# Patient Record
Sex: FEMALE | Race: WHITE | NOT HISPANIC OR LATINO | Employment: FULL TIME | URBAN - METROPOLITAN AREA
[De-identification: names, ages, dates, MRNs, and addresses within clinical notes are randomized per-mention and may not be internally consistent; named-entity substitution may affect disease eponyms.]

---

## 2017-01-04 ENCOUNTER — HOSPITAL ENCOUNTER (OUTPATIENT)
Dept: RADIOLOGY | Facility: HOSPITAL | Age: 57
Discharge: HOME | End: 2017-01-04
Attending: INTERNAL MEDICINE
Payer: COMMERCIAL

## 2017-01-04 DIAGNOSIS — E04.1 THYROID NODULE: ICD-10-CM

## 2017-01-04 PROCEDURE — 76536 US EXAM OF HEAD AND NECK: CPT | Mod: TC

## 2017-01-04 PROCEDURE — 76536 US EXAM OF HEAD AND NECK: CPT | Mod: 26,,, | Performed by: RADIOLOGY

## 2017-03-09 ENCOUNTER — OFFICE VISIT (OUTPATIENT)
Dept: FAMILY MEDICINE | Facility: CLINIC | Age: 57
End: 2017-03-09
Payer: COMMERCIAL

## 2017-03-09 VITALS
BODY MASS INDEX: 27.18 KG/M2 | OXYGEN SATURATION: 95 % | HEIGHT: 62 IN | HEART RATE: 70 BPM | SYSTOLIC BLOOD PRESSURE: 110 MMHG | TEMPERATURE: 99 F | WEIGHT: 147.69 LBS | DIASTOLIC BLOOD PRESSURE: 80 MMHG

## 2017-03-09 DIAGNOSIS — Z87.891 FORMER SMOKER: ICD-10-CM

## 2017-03-09 DIAGNOSIS — E55.9 VITAMIN D DEFICIENCY DISEASE: ICD-10-CM

## 2017-03-09 DIAGNOSIS — M51.36 BULGING LUMBAR DISC: ICD-10-CM

## 2017-03-09 DIAGNOSIS — E07.9 THYROID DISEASE: Primary | ICD-10-CM

## 2017-03-09 PROBLEM — M51.369 BULGING LUMBAR DISC: Status: ACTIVE | Noted: 2017-03-09

## 2017-03-09 PROCEDURE — 1160F RVW MEDS BY RX/DR IN RCRD: CPT | Mod: S$GLB,,, | Performed by: FAMILY MEDICINE

## 2017-03-09 PROCEDURE — 99204 OFFICE O/P NEW MOD 45 MIN: CPT | Mod: S$GLB,,, | Performed by: FAMILY MEDICINE

## 2017-03-09 PROCEDURE — 99999 PR PBB SHADOW E&M-EST. PATIENT-LVL III: CPT | Mod: PBBFAC,,, | Performed by: FAMILY MEDICINE

## 2017-03-09 RX ORDER — DICLOFENAC SODIUM 75 MG/1
TABLET, DELAYED RELEASE ORAL
Refills: 0 | COMMUNITY
Start: 2017-01-31 | End: 2017-03-09 | Stop reason: SDUPTHER

## 2017-03-09 RX ORDER — MONTELUKAST SODIUM 10 MG/1
TABLET ORAL
Refills: 0 | COMMUNITY
Start: 2017-02-27 | End: 2017-08-08

## 2017-03-09 RX ORDER — DICLOFENAC SODIUM 75 MG/1
75 TABLET, DELAYED RELEASE ORAL 2 TIMES DAILY PRN
Qty: 60 TABLET | Refills: 0 | Status: SHIPPED | OUTPATIENT
Start: 2017-03-09 | End: 2018-03-08

## 2017-03-09 RX ORDER — ERGOCALCIFEROL 1.25 MG/1
50000 CAPSULE ORAL
Qty: 12 CAPSULE | Refills: 3 | Status: SHIPPED | OUTPATIENT
Start: 2017-03-09 | End: 2018-04-04 | Stop reason: SDUPTHER

## 2017-03-09 NOTE — MR AVS SNAPSHOT
[facility]  [address]  [place] [address]  Phone: [phone]  Fax: [phone]                  Darlyn Donato   3/9/2017 11:00 AM   Office Visit    Description:  Female : 1960   Provider:  Michelle Caraballo MD   Department:  [facility]           Reason for Visit     Establish Care           Diagnoses this Visit        Comments    Thyroid disease    -  Primary     Former smoker         Bulging lumbar disc         Vitamin D deficiency disease                To Do List           Goals (5 Years of Data)     None       These Medications        Disp Refills Start End    diclofenac (VOLTAREN) 75 MG EC tablet 60 tablet 0 3/9/2017     Take 1 tablet (75 mg total) by mouth 2 (two) times daily as needed. - Oral    Pharmacy: [facility] [address] [place] [address] EXPY AT [facility] Ph #: [phone]       ergocalciferol (ERGOCALCIFEROL) 50,000 unit Cap 12 capsule 3 3/9/2017     Take 1 capsule (50,000 Units total) by mouth every 7 days. - Oral    Pharmacy: [facility] [address] [place] [address] EXPY AT [facility] Ph #: [phone]         [facility]     [facility] On Call Nurse Care Line -  Assistance  Registered nurses in the [facility] Center provide clinical advisement, health education, appointment booking, and other advisory services.  Call for this free service at [phone].             Medications           Message regarding Medications     Verify the changes and/or additions to your medication regime listed below are the same as discussed with your clinician today.  If any of these changes or additions are incorrect, please notify your healthcare provider.        START taking these NEW medications        Refills    diclofenac (VOLTAREN) 75 MG EC tablet 0    Sig: Take 1 tablet (75 mg total) by mouth 2 (two) times daily as needed.    Class: Normal    Route: Oral     ergocalciferol (ERGOCALCIFEROL) 50,000 unit Cap 3    Sig: Take 1 capsule (50,000 Units total) by mouth every 7 days.    Class: Normal    Route: Oral      STOP taking these medications     alprazolam (XANAX) 0.25 MG tablet TAKE 1 TABLET BY MOUTH THREE TIMES DAILY AS NEEDED FOR INSOMNIA OR ANXIETY    calcium carbonate (OS-AMERICA) 600 mg (1,500 mg) Tab Take 600 mg by mouth. 2-3 Tablet Oral Every day.   1500 mg for postmenopausal women    meperidine (DEMEROL) 50 mg tablet Take 1 tablet (50 mg total) by mouth every 4 (four) hours as needed for Pain.    omega-3 fatty acids (FISH OIL CONCENTRATE) 1,000 mg Cap            Verify that the below list of medications is an accurate representation of the medications you are currently taking.  If none reported, the list may be blank. If incorrect, please contact your healthcare provider. Carry this list with you in case of emergency.           Current Medications     albuterol (PROVENTIL HFA/VENTOLIN HFA) 200 puff inhaler Inhale 2 puffs into the lungs every 4 (four) hours as needed. 2 HFA Aerosol Inhaler Inhalation Every 4-6 hours    cyanocobalamin, vitamin B-12, (VITAMIN B-12) 1,000 mcg TbSR Take by mouth once daily.    diclofenac (VOLTAREN) 75 MG EC tablet Take 1 tablet (75 mg total) by mouth 2 (two) times daily as needed.    ergocalciferol (ERGOCALCIFEROL) 50,000 unit Cap Take 1 capsule (50,000 Units total) by mouth every 7 days.    estradiol (ESTRACE) 0.01 % (0.1 mg/gram) vaginal cream Place 0.5 g vaginally every other day.    FLUTICASONE/VILANTEROL (BREO ELLIPTA INHL) Inhale into the lungs.    LEVOXYL 100 mcg tablet TK 1 T PO  D    montelukast (SINGULAIR) 10 mg tablet TK 1 T PO QD    VITAMIN B COMPLEX (B-COMPLEX ORAL) Take by mouth. 1  By mouth Every day    ergocalciferol (ERGOCALCIFEROL) 50,000 unit Cap Take 1 capsule (50,000 Units total) by mouth every 7 days.           Clinical Reference Information           Your Vitals Were     BP Pulse Temp Height Weight SpO2    110/80 (BP  "Location: Right arm, Patient Position: Sitting, BP Method: Manual) 70 98.5 °F (36.9 °C) (Oral) 5' 2" (1.575 m) 67 kg (147 lb 11.3 oz) 95%    BMI                27.02 kg/m2          Blood Pressure          Most Recent Value    BP  110/80      Allergies as of 3/9/2017     Hydromorphone    Percocet  [Oxycodone-acetaminophen]    Vicodin  [Hydrocodone-acetaminophen]      Immunizations Administered on Date of Encounter - 3/9/2017     None      Orders Placed During Today's Visit     Future Labs/Procedures Expected by Expires    CBC auto differential  3/9/2017 3/9/2018    Comprehensive metabolic panel  3/9/2017 3/9/2018    Lipid panel  3/9/2017 3/9/2018    T3  3/9/2017 5/8/2018    T4  3/9/2017 5/8/2018    TSH  3/9/2017 3/9/2018    Vitamin D  3/9/2017 3/9/2018      Language Assistance Services     ATTENTION: Language assistance services are available, free of charge. Please call [phone].      ATENCIÓN: Si habla español, tiene a thorpe disposición servicios gratuitos de asistencia lingüística. Llame al [phone].     CHÚ Ý: N?u b?n nói Ti?ng Vi?t, có các d?ch v? h? tr? ngôn ng? mi?n phí dành cho b?n. G?i s? [phone].         [facility] complies with applicable Federal civil rights laws and does not discriminate on the basis of race, color, national origin, age, disability, or sex.        "

## 2017-03-09 NOTE — PROGRESS NOTES
Office Visit    Patient Name: Darlyn Donato    : 1960  MRN: [id number]    Subjective:  Darlyn is a 56 y.o. female who presents today for:    Establish Care (pt states she is just getting bronchial infection/check up)      This patient has multiple medical diagnoses as noted below.  This patient is new to me and to this clinic.  She recently had a bronchial infection.  She was treated by the urgent care.  She has been using Breo, but has had reactions to the medications.  She has persistent swelling in her bronchial region. She is scheduled for a pulmonary function test.  She is doing better since her illness.        Active Problem List  Patient Active Problem List   Diagnosis    Vitamin D deficiency disease    Kidney stones    Hyperlipidemia    Osteopenia    Thyroid disease    Elevated alkaline phosphatase level    Goiter    Ovarian cyst    Hyperthyroidism    Former smoker    Bulging lumbar disc       Past Surgical History  Past Surgical History:   Procedure Laterality Date    BREAST SURGERY      benign biopsy on left    LAPAROSCOPY W/ MINI-LAPAROTOMY      for fertility issues    [place] REMOVAL OF OVARY/TUBE(S)      RECTAL SURGERY      excess skin growth removal    supracervical hysterectomy      TONSILLECTOMY         Family History  Family History   Problem Relation Age of Onset    Breast cancer Mother     Hyperlipidemia Mother     Emphysema Father     Colon cancer Neg Hx     Ovarian cancer Neg Hx        Social History  Social History     Social History    Marital status:      Spouse name: N/A    Number of children: 0    Years of education: some colle     Occupational History     law firm [employer]     Social History Main Topics    Smoking status: Former Smoker     Types: Cigarettes    Smokeless tobacco: Former User     Quit date: 1997    Alcohol use Yes      Comment: Rare    Drug use: No    Sexual activity: Yes     Partners: Male     Other Topics  "Concern    Not on file     Social History Narrative    Adult Screenings    Mammogram( for females) done 4/2011    Pap ( for females)? Needs referral to  GYN    Colonoscopy age  52-Not done yet    Flu shot yearly to be done today  1/9/13    Td ?    Pneumovax recommended one time  at age  65    Zostavax recommended one time at  age  62    Eye exam recommended yearly- not done yet     Bone density April 2011-osteopenia       Current Medications  Medications reviewed and updated.     Allergies   Review of patient's allergies indicates:   Allergen Reactions    Hydromorphone Shortness Of Breath    Percocet  [oxycodone-acetaminophen] Itching and Nausea Only    Vicodin  [hydrocodone-acetaminophen] Nausea Only and Nausea And Vomiting       Review of Systems (Pertinent positives)  Review of Systems   Constitutional: Negative for activity change, appetite change, fatigue, fever and unexpected weight change.   HENT: Negative.  Negative for ear discharge, ear pain, rhinorrhea and sore throat.    Eyes: Negative.    Respiratory: Positive for cough, shortness of breath and wheezing. Negative for apnea and chest tightness.    Cardiovascular: Negative for chest pain, palpitations and leg swelling.   Gastrointestinal: Negative for abdominal distention, abdominal pain, constipation, diarrhea and vomiting.   Endocrine: Negative for cold intolerance, heat intolerance, polydipsia and polyuria.   Genitourinary: Negative for decreased urine volume, menstrual problem, urgency, vaginal bleeding, vaginal discharge and vaginal pain.   Musculoskeletal: Negative.    Skin: Negative for rash.   Neurological: Negative for dizziness and headaches.   Hematological: Does not bruise/bleed easily.   Psychiatric/Behavioral: Negative for agitation, sleep disturbance and suicidal ideas.       /80 (BP Location: Right arm, Patient Position: Sitting, BP Method: Manual)  Pulse 70  Temp 98.5 °F (36.9 °C) (Oral)   Ht 5' 2" (1.575 m)  Wt 67 kg (147 " lb 11.3 oz)  SpO2 95%  BMI 27.02 kg/m2    Physical Exam   Constitutional: She is oriented to person, place, and time. She appears well-developed and well-nourished.   HENT:   Head: Normocephalic and atraumatic.   Right Ear: External ear normal.   Left Ear: External ear normal.   Nose: Nose normal.   Mouth/Throat: Oropharynx is clear and moist.   Eyes: Conjunctivae and EOM are normal. Pupils are equal, round, and reactive to light.   Neck: Normal range of motion. No JVD present. No thyromegaly present.   Cardiovascular: Normal rate, regular rhythm and normal heart sounds.    Pulmonary/Chest: Effort normal. She has decreased breath sounds in the right lower field and the left lower field. She has no wheezes. She has no rhonchi.   Musculoskeletal: Normal range of motion.   Lymphadenopathy:     She has no cervical adenopathy.   Neurological: She is alert and oriented to person, place, and time.   Skin: Skin is warm and dry.   Psychiatric: She has a normal mood and affect. Her behavior is normal.   Vitals reviewed.      Health Maintenance  Health Maintenance Topics with due status: Not Due       Topic Last Completion Date    Colonoscopy 02/19/2013    Lipid Panel 02/14/2014    Pap Smear 01/30/2017    Mammogram 02/16/2017       Assessment/Plan:  Darlyn Donato is a 56 y.o. female who presents today for :    Darlyn was seen today for establish care.    Diagnoses and all orders for this visit:    Thyroid disease  -     CBC auto differential; Future  -     Comprehensive metabolic panel; Future  -     Lipid panel; Future  -     TSH; Future  -     T3; Future  -     T4; Future  -  Follow up with in 4 weeks to complete blood work.   Former smoker  -  Currently in work up for COPD/ASTHMA.  -  Continue with follow up with pulmonology   -  May need ICS    Bulging lumbar disc  -     diclofenac (VOLTAREN) 75 MG EC tablet; Take 1 tablet (75 mg total) by mouth 2 (two) times daily as needed.  -    I have discussed the common side  effects of this medication with the patient and answered all of the questions they had at the time of this visit regarding this medication.  -  Conservative management     Vitamin D deficiency disease  -     ergocalciferol (ERGOCALCIFEROL) 50,000 unit Cap; Take 1 capsule (50,000 Units total) by mouth every 7 days.  -     Vitamin D; Future    Other orders  -     Cancel: Tdap Vaccine  -     Cancel: Influenza - Quadrivalent (3 years & older) (PF)    Greater than 45 minutes was spent with this patient with greater than 50% spent with face-to-face counseling        No Follow-up on file.

## 2017-04-17 DIAGNOSIS — E05.90 HYPERTHYROIDISM: Primary | ICD-10-CM

## 2017-04-17 RX ORDER — LEVOTHYROXINE SODIUM 100 UG/1
TABLET ORAL
Qty: 30 TABLET | Refills: 0 | Status: SHIPPED | OUTPATIENT
Start: 2017-04-17 | End: 2017-05-15 | Stop reason: SDUPTHER

## 2017-04-17 NOTE — TELEPHONE ENCOUNTER
----- Message from Shyann Gibson sent at 4/17/2017  1:02 PM CDT -----  Patient is calling to request labs. Please call at [phone] Thank you!

## 2017-04-19 ENCOUNTER — LAB VISIT (OUTPATIENT)
Dept: LAB | Facility: HOSPITAL | Age: 57
End: 2017-04-19
Attending: FAMILY MEDICINE
Payer: COMMERCIAL

## 2017-04-19 DIAGNOSIS — E07.9 THYROID DISEASE: ICD-10-CM

## 2017-04-19 DIAGNOSIS — E55.9 VITAMIN D DEFICIENCY DISEASE: ICD-10-CM

## 2017-04-19 LAB
25(OH)D3+25(OH)D2 SERPL-MCNC: 29 NG/ML
ALBUMIN SERPL BCP-MCNC: 3.7 G/DL
ALP SERPL-CCNC: 127 U/L
ALT SERPL W/O P-5'-P-CCNC: 27 U/L
ANION GAP SERPL CALC-SCNC: 9 MMOL/L
AST SERPL-CCNC: 13 U/L
BASOPHILS # BLD AUTO: 0.04 K/UL
BASOPHILS NFR BLD: 0.5 %
BILIRUB SERPL-MCNC: 0.5 MG/DL
BUN SERPL-MCNC: 13 MG/DL
CALCIUM SERPL-MCNC: 9.3 MG/DL
CHLORIDE SERPL-SCNC: 109 MMOL/L
CHOLEST/HDLC SERPL: 5.6 {RATIO}
CO2 SERPL-SCNC: 28 MMOL/L
CREAT SERPL-MCNC: 0.8 MG/DL
DIFFERENTIAL METHOD: NORMAL
EOSINOPHIL # BLD AUTO: 0.4 K/UL
EOSINOPHIL NFR BLD: 4.6 %
ERYTHROCYTE [DISTWIDTH] IN BLOOD BY AUTOMATED COUNT: 14.2 %
EST. GFR  (AFRICAN AMERICAN): >60 ML/MIN/1.73 M^2
EST. GFR  (NON AFRICAN AMERICAN): >60 ML/MIN/1.73 M^2
GLUCOSE SERPL-MCNC: 96 MG/DL
HCT VFR BLD AUTO: 42 %
HDL/CHOLESTEROL RATIO: 17.9 %
HDLC SERPL-MCNC: 212 MG/DL
HDLC SERPL-MCNC: 38 MG/DL
HGB BLD-MCNC: 13.6 G/DL
LDLC SERPL CALC-MCNC: 145.6 MG/DL
LYMPHOCYTES # BLD AUTO: 2.3 K/UL
LYMPHOCYTES NFR BLD: 26.7 %
MCH RBC QN AUTO: 29.2 PG
MCHC RBC AUTO-ENTMCNC: 32.4 %
MCV RBC AUTO: 90 FL
MONOCYTES # BLD AUTO: 0.7 K/UL
MONOCYTES NFR BLD: 7.7 %
NEUTROPHILS # BLD AUTO: 5.3 K/UL
NEUTROPHILS NFR BLD: 60.3 %
NONHDLC SERPL-MCNC: 174 MG/DL
PLATELET # BLD AUTO: 340 K/UL
PMV BLD AUTO: 10 FL
POTASSIUM SERPL-SCNC: 4.3 MMOL/L
PROT SERPL-MCNC: 7 G/DL
RBC # BLD AUTO: 4.66 M/UL
SODIUM SERPL-SCNC: 146 MMOL/L
T3 SERPL-MCNC: 76 NG/DL
T4 SERPL-MCNC: 8.5 UG/DL
TRIGL SERPL-MCNC: 142 MG/DL
TSH SERPL DL<=0.005 MIU/L-ACNC: 2.17 UIU/ML
WBC # BLD AUTO: 8.74 K/UL

## 2017-04-19 PROCEDURE — 84480 ASSAY TRIIODOTHYRONINE (T3): CPT

## 2017-04-19 PROCEDURE — 82306 VITAMIN D 25 HYDROXY: CPT

## 2017-04-19 PROCEDURE — 80053 COMPREHEN METABOLIC PANEL: CPT

## 2017-04-19 PROCEDURE — 84443 ASSAY THYROID STIM HORMONE: CPT

## 2017-04-19 PROCEDURE — 85025 COMPLETE CBC W/AUTO DIFF WBC: CPT

## 2017-04-19 PROCEDURE — 84436 ASSAY OF TOTAL THYROXINE: CPT

## 2017-04-19 PROCEDURE — 36415 COLL VENOUS BLD VENIPUNCTURE: CPT | Mod: PO

## 2017-04-19 PROCEDURE — 80061 LIPID PANEL: CPT

## 2017-05-01 NOTE — PROGRESS NOTES
Your vitamin D is still low, but I want you to continue with oral vitamin D.  Your cholesterol is slightly elevated.  Remember to avoid high fat foods.

## 2017-05-15 DIAGNOSIS — E05.90 HYPERTHYROIDISM: ICD-10-CM

## 2017-05-15 RX ORDER — LEVOTHYROXINE SODIUM 100 UG/1
TABLET ORAL
Qty: 30 TABLET | Refills: 5 | Status: SHIPPED | OUTPATIENT
Start: 2017-05-15 | End: 2017-12-05 | Stop reason: SDUPTHER

## 2017-08-08 ENCOUNTER — OFFICE VISIT (OUTPATIENT)
Dept: DERMATOLOGY | Facility: CLINIC | Age: 57
End: 2017-08-08
Payer: COMMERCIAL

## 2017-08-08 DIAGNOSIS — D22.9 MULTIPLE BENIGN NEVI: ICD-10-CM

## 2017-08-08 DIAGNOSIS — L82.1 SEBORRHEIC KERATOSES: ICD-10-CM

## 2017-08-08 DIAGNOSIS — D48.5 NEOPLASM OF UNCERTAIN BEHAVIOR OF SKIN: Primary | ICD-10-CM

## 2017-08-08 PROCEDURE — 88305 TISSUE EXAM BY PATHOLOGIST: CPT | Performed by: PATHOLOGY

## 2017-08-08 PROCEDURE — 99202 OFFICE O/P NEW SF 15 MIN: CPT | Mod: 25,S$GLB,, | Performed by: DERMATOLOGY

## 2017-08-08 PROCEDURE — 11100 PR BIOPSY OF SKIN LESION: CPT | Mod: S$GLB,,, | Performed by: DERMATOLOGY

## 2017-08-08 PROCEDURE — 88305 TISSUE EXAM BY PATHOLOGIST: CPT | Mod: 26,,, | Performed by: PATHOLOGY

## 2017-08-08 PROCEDURE — 99999 PR PBB SHADOW E&M-EST. PATIENT-LVL III: CPT | Mod: PBBFAC,,, | Performed by: DERMATOLOGY

## 2017-08-08 PROCEDURE — 3008F BODY MASS INDEX DOCD: CPT | Mod: S$GLB,,, | Performed by: DERMATOLOGY

## 2017-08-08 PROCEDURE — 11101 PR BIOPSY, EACH ADDED LESION: CPT | Mod: S$GLB,,, | Performed by: DERMATOLOGY

## 2017-08-08 PROCEDURE — 88342 IMHCHEM/IMCYTCHM 1ST ANTB: CPT | Mod: 26,,, | Performed by: PATHOLOGY

## 2017-08-08 NOTE — PROGRESS NOTES
"  Subjective:       Patient ID:  Darlyn Donato is a 57 y.o. female who presents for   Chief Complaint   Patient presents with    Skin Check     UBSE     HPI  56 yo F presents for skin check.  There is a spot on scalp that was noticed about 1 year ago by her niece.  Unsure how long it has been present.  No bleeding.  No prior treatments.  There is a spot on her L hip present since she was 12 years old.  Traumatized and painful, no prior treatments    Denies personal or family h/o skin cancer (mother with h/o breast cancer)    Past Medical History:   Diagnosis Date    Depression     Elevated alkaline phosphatase level     Hyperlipidemia     Osteopenia     Personal history of female infertility     Respiratory distress     " stopped Breathing" after 2 pain meds were given in a close time frame to one another.    Thyroid disease     multinodular goiter    Vitamin D deficiency disease      Review of Systems   Constitutional: Positive for fatigue and night sweats. Negative for fever, chills, weight loss, weight gain and malaise.   Skin: Negative for daily sunscreen use and recent sunburn.   Hematologic/Lymphatic: Does not bruise/bleed easily.        Objective:    Physical Exam   Constitutional: She appears well-developed and well-nourished.   Neurological: She is alert and oriented to person, place, and time.   Psychiatric: She has a normal mood and affect.   Skin:   Areas Examined (abnormalities noted in diagram):   Scalp / Hair Palpated and Inspected  Head / Face Inspection Performed  Neck Inspection Performed  Chest / Axilla Inspection Performed  Abdomen Inspection Performed  Back Inspection Performed  RUE Inspected  LUE Inspection Performed                           Diagram Legend     Erythematous scaling macule/papule c/w actinic keratosis       Vascular papule c/w angioma      Pigmented verrucoid papule/plaque c/w seborrheic keratosis      Yellow umbilicated papule c/w sebaceous hyperplasia      " Irregularly shaped tan macule c/w lentigo     1-2 mm smooth white papules consistent with Milia      Movable subcutaneous cyst with punctum c/w epidermal inclusion cyst      Subcutaneous movable cyst c/w pilar cyst      Firm pink to brown papule c/w dermatofibroma      Pedunculated fleshy papule(s) c/w skin tag(s)      Evenly pigmented macule c/w junctional nevus     Mildly variegated pigmented, slightly irregular-bordered macule c/w mildly atypical nevus      Flesh colored to evenly pigmented papule c/w intradermal nevus       Pink pearly papule/plaque c/w basal cell carcinoma      Erythematous hyperkeratotic cursted plaque c/w SCC      Surgical scar with no sign of skin cancer recurrence      Open and closed comedones      Inflammatory papules and pustules      Verrucoid papule consistent consistent with wart     Erythematous eczematous patches and plaques     Dystrophic onycholytic nail with subungual debris c/w onychomycosis     Umbilicated papule    Erythematous-base heme-crusted tan verrucoid plaque consistent with inflamed seborrheic keratosis     Erythematous Silvery Scaling Plaque c/w Psoriasis     See annotation      Assessment / Plan:      Pathology Orders:      Normal Orders This Visit    Tissue Specimen To Pathology, Dermatology     Questions:    Directional Terms:  Other(comment)    Clinical information:  SK vs nevus vs other    Specific Site:  L hip    Tissue Specimen To Pathology, Dermatology     Questions:    Directional Terms:  Other(comment)    Clinical information:  r/o blue nevus vs atypical nevus; shave then 4 mm punch of underlying pigment    Specific Site:  L crown of scalp        Neoplasm of uncertain behavior of skin  -     Tissue Specimen To Pathology, Dermatology  -     Tissue Specimen To Pathology, Dermatology  Shave biopsy procedure note:    Shave biopsy performed after verbal consent including risk of infection, scar, recurrence, need for additional treatment of site. Area prepped with  alcohol, anesthetized with approximately 1.0cc of 1% lidocaine with epinephrine. Lesional tissue shaved with razor blade. Hemostasis achieved with application of aluminum chloride. No complications. Dressing applied. Wound care explained.    Punch biopsy procedure note:  Punch biopsy performed of the pigment remaining after shave biopsy.  Verbal consent obtained. Area marked and prepped with alcohol. Approximately 1cc of 1% lidocaine with epinephrine injected. 4 mm disposable punch used to remove lesion. Hemostasis obtained and biopsy site closed with 2 Prolene sutures. Wound care instructions reviewed with patient and handout given.    Multiple benign nevi  Reassurance that her nevi appear benign with regular and consistent pigment pattern on dermoscopy    Seborrheic keratoses  These are benign inherited growths without a malignant potential. Reassurance given to patient. No treatment is necessary.            Return for pending Pathology.

## 2017-08-22 ENCOUNTER — CLINICAL SUPPORT (OUTPATIENT)
Dept: DERMATOLOGY | Facility: CLINIC | Age: 57
End: 2017-08-22
Payer: COMMERCIAL

## 2017-08-22 DIAGNOSIS — Z48.02 VISIT FOR SUTURE REMOVAL: Primary | ICD-10-CM

## 2017-08-22 PROCEDURE — 99024 POSTOP FOLLOW-UP VISIT: CPT | Mod: S$GLB,,, | Performed by: DERMATOLOGY

## 2017-08-22 PROCEDURE — 99999 PR PBB SHADOW E&M-EST. PATIENT-LVL II: CPT | Mod: PBBFAC,,,

## 2017-08-24 NOTE — PROGRESS NOTES
Suture Removal note:  CC: 57 y.o. female patient is here for suture removal.         HPI: Patient is s/p punch of rule out blue nevus vs. atypical nevus from the left crown of scalp on 8/8/2017.  Patient reports no problems.    WOUND PE:  Sutures intact.  Wound healing well.  Good approximation of skin edges.  No signs or symptoms of infection.    IMPRESSION:  Skin, left crown of scalp, shave biopsy with subsequent 4 mm punch biopsy:  - BLUE NEVUS.  MICROSCOPIC DESCRIPTION: Sections show dermal proliferation of pigmented dendritic melanocytes interspersed between and arround dermal collagen. Melanophages are additionally noted - margins clear.    PLAN:  Sutures removed today.  Continue wound care.    RTC: In 3 months or sooner if concerns arise.

## 2017-12-05 DIAGNOSIS — E05.90 HYPERTHYROIDISM: ICD-10-CM

## 2017-12-06 RX ORDER — LEVOTHYROXINE SODIUM 100 UG/1
TABLET ORAL
Qty: 30 TABLET | Refills: 0 | Status: SHIPPED | OUTPATIENT
Start: 2017-12-06 | End: 2018-01-10 | Stop reason: SDUPTHER

## 2018-01-10 DIAGNOSIS — E05.90 HYPERTHYROIDISM: ICD-10-CM

## 2018-01-11 RX ORDER — LEVOTHYROXINE SODIUM 100 UG/1
TABLET ORAL
Qty: 30 TABLET | Refills: 0 | Status: SHIPPED | OUTPATIENT
Start: 2018-01-11 | End: 2018-02-22 | Stop reason: SDUPTHER

## 2018-02-08 ENCOUNTER — OFFICE VISIT (OUTPATIENT)
Dept: FAMILY MEDICINE | Facility: CLINIC | Age: 58
End: 2018-02-08
Payer: COMMERCIAL

## 2018-02-08 VITALS
WEIGHT: 149.69 LBS | TEMPERATURE: 99 F | HEART RATE: 76 BPM | SYSTOLIC BLOOD PRESSURE: 120 MMHG | OXYGEN SATURATION: 95 % | BODY MASS INDEX: 27.55 KG/M2 | HEIGHT: 62 IN | DIASTOLIC BLOOD PRESSURE: 92 MMHG

## 2018-02-08 DIAGNOSIS — M19.072 ARTHRITIS OF BOTH FEET: ICD-10-CM

## 2018-02-08 DIAGNOSIS — M19.071 ARTHRITIS OF BOTH FEET: ICD-10-CM

## 2018-02-08 DIAGNOSIS — M19.049 ARTHRITIS OF HAND: Primary | ICD-10-CM

## 2018-02-08 PROCEDURE — 3008F BODY MASS INDEX DOCD: CPT | Mod: S$GLB,,, | Performed by: FAMILY MEDICINE

## 2018-02-08 PROCEDURE — 99213 OFFICE O/P EST LOW 20 MIN: CPT | Mod: S$GLB,,, | Performed by: FAMILY MEDICINE

## 2018-02-08 PROCEDURE — 99999 PR PBB SHADOW E&M-EST. PATIENT-LVL III: CPT | Mod: PBBFAC,,, | Performed by: FAMILY MEDICINE

## 2018-02-08 PROCEDURE — 99213 OFFICE O/P EST LOW 20 MIN: CPT | Mod: PO | Performed by: FAMILY MEDICINE

## 2018-02-08 NOTE — PROGRESS NOTES
Office Visit    Patient Name: Darlyn Donato    : 1960  MRN: [id number]      Assessment/Plan:  Darlyn Donato is a 57 y.o. female who presents today for :    Arthritis of hand  -     Ambulatory referral to Rheumatology    Arthritis of both feet  -     Ambulatory referral to Rheumatology    -may continue Aleve as needed, may also add Tylenol as needed as well.  -given duration and constellation of Sx, will obtain further eval with Rheum.        Follow-up for any evaluation as needed.     This note was created by combination of typed  and Dragon dictation.  Transcription errors may be present.  If there are any questions, please contact me.        ----------------------------------------------------------------------------------------------------------------------      HPI:  Darlyn is a 57 y.o. female who presents today for:    Hand Pain and Foot Pain        This patient has multiple medical diagnoses as noted below.    This patient is new to me   Patient is here today for bilateral Hand Pain and Foot Pain  -states that she has had joint pain for many years in her feet. Has had joint injection in both feet last year by her Ortho, who sees her for her chronic back pain. Feet pain worsened over the past 3 months, worse with walking and standing on it - worse in the morning, gets better as the day goes on. She takes Aleve as needed, but the pain is still present.    She also has worsening bilateral hand pain the past 3 months - feels that they're in the joints at the base of her fingers. Sometimes they get swollen and she can't close her fist completely. No prior Dx of Carpal Tunnel, but she does wear wrist braces for temporary relief. 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 Present daily, constant, about 6-7/10.    She doesn't want to keep taking medications for the pain and would like a specialist  referral.      Additional ROS    No F/C/wt changes/fatigue  No dysphagia/sore throat/rhinorrhea  No CP/SOB/palpitations/swelling  No cough/wheezing/SOB  No nausea/vomiting/abd pain  No muscle aches  No rashes  No weakness/HA/tingling/numbness  No anxiety/depression        Patient Care Team:  Michelle Caraballo MD as PCP - General (Family Medicine)        Patient Active Problem List   Diagnosis    Vitamin D deficiency disease    Kidney stones    Hyperlipidemia    Osteopenia    Thyroid disease    Elevated alkaline phosphatase level    Goiter    Ovarian cyst    Hyperthyroidism    Former smoker    Bulging lumbar disc       Current Medications  Medications reviewed and updated.       Current Outpatient Prescriptions:     albuterol (PROVENTIL HFA/VENTOLIN HFA) 200 puff inhaler, Inhale 2 puffs into the lungs every 4 (four) hours as needed. 2 HFA Aerosol Inhaler Inhalation Every 4-6 hours, Disp: , Rfl:     ASPIRIN/CAFFEINE/DIHYDROCODEIN (DIHYDROCODEINE-ASPIRIN-CAFF ORAL), Take by mouth., Disp: , Rfl:     cyanocobalamin, vitamin B-12, (VITAMIN B-12) 1,000 mcg TbSR, Take by mouth once daily., Disp: , Rfl:     diclofenac (VOLTAREN) 75 MG EC tablet, Take 1 tablet (75 mg total) by mouth 2 (two) times daily as needed., Disp: 60 tablet, Rfl: 0    ergocalciferol (ERGOCALCIFEROL) 50,000 unit Cap, Take 1 capsule (50,000 Units total) by mouth every 7 days., Disp: 4 capsule, Rfl: 4    ergocalciferol (ERGOCALCIFEROL) 50,000 unit Cap, Take 1 capsule (50,000 Units total) by mouth every 7 days., Disp: 12 capsule, Rfl: 3    FLUTICASONE/VILANTEROL (BREO ELLIPTA INHL), Inhale into the lungs., Disp: , Rfl:     LEVOXYL 100 mcg tablet, TAKE 1 TABLET BY MOUTH EVERY DAY, Disp: 30 tablet, Rfl: 0    VITAMIN B COMPLEX (B-COMPLEX ORAL), Take by mouth. 1  By mouth Every day, Disp: , Rfl:     estradiol (ESTRACE) 0.01 % (0.1 mg/gram) vaginal cream, Place 0.5 g vaginally every other day., Disp: 42.5 g, Rfl: 5    Past Surgical  "History:   Procedure Laterality Date    BREAST SURGERY      benign biopsy on left    LAPAROSCOPY W/ MINI-LAPAROTOMY      for fertility issues    OK REMOVAL OF OVARY/TUBE(S)      RECTAL SURGERY      excess skin growth removal    supracervical hysterectomy      TONSILLECTOMY         Family History   Problem Relation Age of Onset    Breast cancer Mother     Hyperlipidemia Mother     Emphysema Father     Colon cancer Neg Hx     Ovarian cancer Neg Hx     Melanoma Neg Hx        Social History     Social History    Marital status:      Spouse name: N/A    Number of children: 0    Years of education: some colle     Occupational History     law firm [email or web address]     Social History Main Topics    Smoking status: Former Smoker     Types: Cigarettes    Smokeless tobacco: Former User     Quit date: 8/22/1997    Alcohol use Yes      Comment: Rare    Drug use: No    Sexual activity: Yes     Partners: Male     Other Topics Concern    Not on file     Social History Narrative    Adult Screenings    Mammogram( for females) done 4/2011    Pap ( for females)? Needs referral to  GYN    Colonoscopy age  52-Not done yet    Flu shot yearly to be done today  1/9/13    Td ?    Pneumovax recommended one time  at age  67    Zostavax recommended one time at  age  62    Eye exam recommended yearly- not done yet     Bone density April 2011-osteopenia           Allergies   Review of patient's allergies indicates:   Allergen Reactions    Hydromorphone Shortness Of Breath    Percocet  [oxycodone-acetaminophen] Itching and Nausea Only    Vicodin  [hydrocodone-acetaminophen] Nausea Only and Nausea And Vomiting             Review of Systems  See HPI      Physical Exam  BP (!) 120/92   Pulse 76   Temp 98.6 °F (37 °C) (Oral)   Ht 5' 2" (1.575 m)   Wt 67.9 kg (149 lb 11.1 oz)   SpO2 95%   BMI 27.38 kg/m²     GEN: NAD, well developed, pleasant, well nourished  HEENT: NCAT, PERRLA, EOMI, sclera clear, " anicteric, O/P clear, MMM with no lesions  NECK: normal, supple with midline trachea, no LAD, no thyromegaly  LUNGS: CTAB, no w/r/r, no increased work of breathing   HEART: RRR, normal S1 and S2, no m/r/g, no edema  ABD: s/nt/nd, NABS  SKIN: normal turgor, no rashes  PSYCH: AOx3, appropriate mood and affect  MSK: warm/well perfused, normal ROM in all 4 extremities, no c/c/e.  Normal exam of R hand. FROM in all digits bilaterally. Has mild swelling with no redness in the MCP joint of L hand - with moderate TTP. Negative Tinel's/Phalen's. Bilateral foot exam wnl with FROM, no leg/ankle swelling/redness. Mild TTP over dorsal aspect of anterior feet bilaterally.

## 2018-02-16 ENCOUNTER — OFFICE VISIT (OUTPATIENT)
Dept: URGENT CARE | Facility: CLINIC | Age: 58
End: 2018-02-16
Payer: COMMERCIAL

## 2018-02-16 VITALS
OXYGEN SATURATION: 96 % | WEIGHT: 148 LBS | HEIGHT: 62 IN | BODY MASS INDEX: 27.23 KG/M2 | RESPIRATION RATE: 18 BRPM | SYSTOLIC BLOOD PRESSURE: 141 MMHG | DIASTOLIC BLOOD PRESSURE: 86 MMHG | TEMPERATURE: 99 F | HEART RATE: 101 BPM

## 2018-02-16 DIAGNOSIS — J01.40 ACUTE NON-RECURRENT PANSINUSITIS: Primary | ICD-10-CM

## 2018-02-16 DIAGNOSIS — R05.9 COUGH: ICD-10-CM

## 2018-02-16 PROCEDURE — 99213 OFFICE O/P EST LOW 20 MIN: CPT | Mod: 25,S$GLB,, | Performed by: NURSE PRACTITIONER

## 2018-02-16 PROCEDURE — 96372 THER/PROPH/DIAG INJ SC/IM: CPT | Mod: S$GLB,,, | Performed by: FAMILY MEDICINE

## 2018-02-16 PROCEDURE — 3008F BODY MASS INDEX DOCD: CPT | Mod: S$GLB,,, | Performed by: NURSE PRACTITIONER

## 2018-02-16 RX ORDER — BETAMETHASONE SODIUM PHOSPHATE AND BETAMETHASONE ACETATE 3; 3 MG/ML; MG/ML
6 INJECTION, SUSPENSION INTRA-ARTICULAR; INTRALESIONAL; INTRAMUSCULAR; SOFT TISSUE
Status: COMPLETED | OUTPATIENT
Start: 2018-02-16 | End: 2018-02-16

## 2018-02-16 RX ORDER — BENZONATATE 100 MG/1
200 CAPSULE ORAL 3 TIMES DAILY PRN
Qty: 30 CAPSULE | Refills: 0 | Status: SHIPPED | OUTPATIENT
Start: 2018-02-16 | End: 2018-02-26

## 2018-02-16 RX ORDER — AMOXICILLIN AND CLAVULANATE POTASSIUM 875; 125 MG/1; MG/1
1 TABLET, FILM COATED ORAL 2 TIMES DAILY
Qty: 20 TABLET | Refills: 0 | Status: SHIPPED | OUTPATIENT
Start: 2018-02-16 | End: 2018-02-26

## 2018-02-16 RX ADMIN — BETAMETHASONE SODIUM PHOSPHATE AND BETAMETHASONE ACETATE 6 MG: 3; 3 INJECTION, SUSPENSION INTRA-ARTICULAR; INTRALESIONAL; INTRAMUSCULAR; SOFT TISSUE at 06:02

## 2018-02-16 NOTE — PROGRESS NOTES
"Subjective:       Patient ID: Darlyn Donato is a 57 y.o. female.    Vitals:  height is 5' 2" (1.575 m) and weight is 67.1 kg (148 lb). Her oral temperature is 98.6 °F (37 °C). Her blood pressure is 141/86 (abnormal) and her pulse is 101. Her respiration is 18 and oxygen saturation is 96%.     Chief Complaint: Cough and Knee Pain    Pt reports she has been congested and coughing for 1 week with sinus pressure. Cough is productive       Cough   The current episode started in the past 7 days. The problem has been gradually worsening. The problem occurs constantly. Associated symptoms include headaches, shortness of breath and wheezing. Pertinent negatives include no chest pain, chills, ear pain, eye redness, fever, myalgias or sore throat. The symptoms are aggravated by lying down. She has tried OTC cough suppressant for the symptoms. Her past medical history is significant for bronchitis.     Review of Systems   Constitution: Negative for chills, fever and malaise/fatigue.   HENT: Negative for congestion, ear pain, hoarse voice and sore throat.    Eyes: Negative for discharge and redness.   Cardiovascular: Negative for chest pain, dyspnea on exertion and leg swelling.   Respiratory: Positive for cough, shortness of breath and wheezing. Negative for sputum production.    Musculoskeletal: Negative for myalgias.   Gastrointestinal: Negative for abdominal pain and nausea.   Neurological: Positive for headaches.       Objective:      Physical Exam   Constitutional: She is oriented to person, place, and time. Vital signs are normal. She appears well-developed and well-nourished. She is cooperative.  Non-toxic appearance. She does not have a sickly appearance. She does not appear ill. No distress.   HENT:   Head: Normocephalic and atraumatic.   Right Ear: Hearing, tympanic membrane, external ear and ear canal normal.   Left Ear: Hearing, tympanic membrane, external ear and ear canal normal.   Nose: Mucosal edema and " rhinorrhea present. Right sinus exhibits maxillary sinus tenderness and frontal sinus tenderness. Left sinus exhibits maxillary sinus tenderness and frontal sinus tenderness.   Mouth/Throat: Uvula is midline and mucous membranes are normal. Posterior oropharyngeal erythema present.   Eyes: Conjunctivae and lids are normal.   Neck: Normal range of motion and full passive range of motion without pain. Neck supple. No neck rigidity. No edema, no erythema and normal range of motion present.   Cardiovascular: Normal rate, regular rhythm and normal heart sounds.    Pulmonary/Chest: Effort normal and breath sounds normal. No accessory muscle usage. No apnea, no tachypnea and no bradypnea. No respiratory distress. She has no decreased breath sounds. She has no wheezes. She has no rhonchi. She has no rales.   Positive cough   Abdominal: Normal appearance.   Lymphadenopathy:        Head (right side): No submental, no submandibular and no tonsillar adenopathy present.        Head (left side): No submental, no submandibular and no tonsillar adenopathy present.     She has no cervical adenopathy.   Neurological: She is alert and oriented to person, place, and time.   Psychiatric: She has a normal mood and affect. Her speech is normal and behavior is normal.   Nursing note and vitals reviewed.      Assessment:       1. Acute non-recurrent pansinusitis    2. Cough        Plan:         Acute non-recurrent pansinusitis  -     betamethasone acetate-betamethasone sodium phosphate injection 6 mg; Inject 1 mL (6 mg total) into the muscle one time.  -     benzonatate (TESSALON PERLES) 100 MG capsule; Take 2 capsules (200 mg total) by mouth 3 (three) times daily as needed.  Dispense: 30 capsule; Refill: 0  -     amoxicillin-clavulanate 875-125mg (AUGMENTIN) 875-125 mg per tablet; Take 1 tablet by mouth 2 (two) times daily.  Dispense: 20 tablet; Refill: 0    Cough      Instructed pt to follow up with pcp for no improvement in 7-10 days.

## 2018-02-17 NOTE — PATIENT INSTRUCTIONS
Please drink plenty of fluids.  Please get plenty of rest.    Please return here or go to the Emergency Department for any concerns or worsening of condition.    If you were prescribed antibiotics, please take them to completion.    If you do have Hypertension or palpitations, it is safe to take Coricidin HBP or Mucinex DM for relief of congestion and cough. Take as directed on bottle with at least 2 glasses of water.    If not allergic, please take over the counter Tylenol (Acetaminophen) and/or Motrin (Ibuprofen) as directed on bottle for control of pain and/or fever.    Please follow up with your primary care doctor or specialist as needed.    If you  smoke, please stop smoking.    Acute Bacterial Rhinosinusitis (ABRS)    Acute bacterial rhinosinusitis (ABRS) is an infection of your nasal cavity and sinuses. Its caused by bacteria. Acute means that youve had symptoms for less than 12 weeks.  Understanding your sinuses  The nasal cavity is the large air-filled space behind your nose. The sinuses are a group of spaces formed by the bones of your face. They connect with your nasal cavity. ABRS causes the tissue lining these spaces to become inflamed. Mucus may not drain normally. This leads to facial pain and other symptoms.  What causes ABRS?  ABRS most often follows an upper respiratory infection caused by a virus. Bacteria then infect the lining of your nasal cavity and sinuses. But you can also get ABRS if you have:  · Nasal allergies  · Long-term nasal swelling and congestion not caused by allergies  · Blockage in the nose  Symptoms of ABRS  The symptoms of ABRS may be different for each person, and can include:  · Nasal congestion  · Runny nose  · Fluid draining from the nose down the throat (postnasal drip)  · Headache  · Cough  · Pain in the sinuses  · Thick, colored fluid from the nose (mucus)  · Fever  Diagnosing ABRS  ABRS may be diagnosed if youve had an upper respiratory infection like a cold and  cough for longer than 10 to 14 days. Your health care provider will ask about your symptoms and your medical history. The provider will check your vital signs, including your temperature. Youll have a physical exam. The health care provider will check your ears, nose, and throat. You likely wont need any tests. If ABRS comes back, you may have a culture or other tests.  Treatment for ABRS  Treatment may include:  · Antibiotic medicine. This is for symptoms that last for at least 10 to 14 days.  · Nasal corticosteroid medicine. Drops or spray used in the nose can lessen swelling and congestion.  · Over-the-counter pain medicine. This is to lessen sinus pain and pressure.  · Nasal decongestant medicine. Spray or drops may help to lessen congestion. Do not use them for more than a few days.  · Salt wash (saline irrigation). This can help to loosen mucus.  Possible complications of ABRS  ABRS may come back or become long-term (chronic).  In rare cases, ABRS may cause complications such as:   · Inflamed tissue around the brain and spinal cord (meningitis)  · Inflamed tissue around the eyes (orbital cellulitis)  · Inflamed bones around the sinuses (osteitis)  These problems may need to be treated in a hospital with intravenous (IV) antibiotic medicine or surgery.  When to call the health care provider  Call your health care provider if you have any of the following:  · Symptoms that dont get better, or get worse  · Symptoms that dont get better after 3 to 5 days on antibiotics  · Trouble seeing  · Swelling around your eyes  · Confusion or trouble staying awake   Date Last Reviewed: 3/3/2015  © 5435-5692 [employer]. [address] [place] [address], [place] [address]. All rights reserved. This information is not intended as a substitute for professional medical care. Always follow your healthcare professional's instructions.

## 2018-02-22 DIAGNOSIS — E05.90 HYPERTHYROIDISM: ICD-10-CM

## 2018-02-22 RX ORDER — LEVOTHYROXINE SODIUM 100 UG/1
TABLET ORAL
Qty: 30 TABLET | Refills: 0 | Status: SHIPPED | OUTPATIENT
Start: 2018-02-22 | End: 2018-04-05 | Stop reason: SDUPTHER

## 2018-03-08 ENCOUNTER — HOSPITAL ENCOUNTER (OUTPATIENT)
Dept: RADIOLOGY | Facility: HOSPITAL | Age: 58
Discharge: HOME | End: 2018-03-08
Attending: INTERNAL MEDICINE
Payer: COMMERCIAL

## 2018-03-08 ENCOUNTER — OFFICE VISIT (OUTPATIENT)
Dept: RHEUMATOLOGY | Facility: CLINIC | Age: 58
End: 2018-03-08
Payer: COMMERCIAL

## 2018-03-08 VITALS
SYSTOLIC BLOOD PRESSURE: 139 MMHG | WEIGHT: 147 LBS | BODY MASS INDEX: 26.89 KG/M2 | DIASTOLIC BLOOD PRESSURE: 95 MMHG | HEART RATE: 72 BPM

## 2018-03-08 DIAGNOSIS — M13.0 POLYARTHRITIS: Primary | ICD-10-CM

## 2018-03-08 DIAGNOSIS — M13.0 POLYARTHRITIS: ICD-10-CM

## 2018-03-08 DIAGNOSIS — M51.36 BULGING LUMBAR DISC: ICD-10-CM

## 2018-03-08 PROCEDURE — 99999 PR PBB SHADOW E&M-EST. PATIENT-LVL III: CPT | Mod: PBBFAC,,, | Performed by: INTERNAL MEDICINE

## 2018-03-08 PROCEDURE — 73630 X-RAY EXAM OF FOOT: CPT | Mod: 50,TC

## 2018-03-08 PROCEDURE — 73630 X-RAY EXAM OF FOOT: CPT | Mod: 26,50,, | Performed by: RADIOLOGY

## 2018-03-08 PROCEDURE — 73130 X-RAY EXAM OF HAND: CPT | Mod: 50,TC

## 2018-03-08 PROCEDURE — 73130 X-RAY EXAM OF HAND: CPT | Mod: 26,50,, | Performed by: RADIOLOGY

## 2018-03-08 PROCEDURE — 99244 OFF/OP CNSLTJ NEW/EST MOD 40: CPT | Mod: S$GLB,,, | Performed by: INTERNAL MEDICINE

## 2018-03-08 RX ORDER — FENOPROFEN CALCIUM 400 MG/1
400 CAPSULE ORAL ONCE AS NEEDED
COMMUNITY
End: 2018-03-08

## 2018-03-08 RX ORDER — DICLOFENAC SODIUM 75 MG/1
75 TABLET, DELAYED RELEASE ORAL 2 TIMES DAILY PRN
Qty: 60 TABLET | Refills: 3 | Status: SHIPPED | OUTPATIENT
Start: 2018-03-08 | End: 2018-04-07

## 2018-03-08 NOTE — LETTER
March 8, 2018      Chu Kimball MD  [address]  [place] [address]           [facility]  [address]  [place] [address]  Phone: [phone]  Fax: [phone]          Patient: Darlyn Donato   MR Number: [id number]   YOB: 1960   Date of Visit: 3/8/2018       Dear Dr. Chu Kimball:    Thank you for referring Darlyn Donato to me for evaluation. Attached you will find relevant portions of my assessment and plan of care.    If you have questions, please do not hesitate to call me. I look forward to following Darlyn Donato along with you.    Sincerely,    Zain Hernandez MD    Enclosure  CC:  No Recipients    If you would like to receive this communication electronically, please contact [email or web address] or [phone] to request more information on [facility] access.    For providers and/or their staff who would like to refer a patient to [facility], please contact us through our one-stop-shop provider referral line, [facility], at [phone].    If you feel you have received this communication in error or would no longer like to receive these types of communications, please e-mail [email or web address]

## 2018-03-08 NOTE — PROGRESS NOTES
History of present illness: 57-year-old female with chronic low back pain.  Proximally 2 years ago she developed pain initially in the right foot.  The pain was primarily on the dorsum of the foot.  She had some swelling in the area.  It was worse when she got out of bed in the morning.  It was worse with activity.  It does wake her up at night.  She has 2 hours of morning stiffness.  She had no erythema but some increased warmth accompanying the swelling.  She has had some paresthesias in her feet.  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  This helped for a period of time.  The pain is now spread to the ankles.  She then developed pain in the wrist.  It spread into the fingers.  It is been especially bad in the left second finger.  She has trouble bending that finger.  She has had some pain in the neck.  She has had no problems with the elbows or shoulders.  She has had some pain in the knees.    She had been placed on diclofenac several years ago for her back problem.  It helped at that time.   She takes Aleve intermittently with some response.  She has not been using heat, ice, or topical medications.  She was given a cortisone shot when she had a sinus infection.  This helped all her pain for a period of time.    No fever, headache, rash, conjunctivitis, oral ulcers, dry eyes or mouth, Raynaud's phenomenon, pleurisy, chronic or bloody diarrhea, vaginal or urethral discharge or ulcer, numbness or tingling, blood clots or phlebitis.  Her mother has rheumatoid arthritis.    Systems review:  Gen.: Weight has been stable  GI: No abdominal pain or peptic ulcer disease.  No liver problems.  : No kidney or bladder problems    Physical examination:  Skin: No rashes  ENT: Adequate tears and saliva  Musculoskeletal: Cervical spine has good range of motion without pain on range of motion.  She has tenderness over the acromioclavicular joints bilaterally but has full range of motion of the shoulder  without pain on range of motion.  Elbows and wrist are unremarkable.  She has tenderness of the left second MCP.  She has no definite swelling.  She has decreased range of motion of the finger.  Lumbar spine has good range of motion without pain on range of motion.  Hips and knees are within normal limits.  She has tenderness on the lateral aspect of the right ankle and foot.  She has no swelling.  She has pain on range of motion of the left knee but no tender area.  She is tender across the left MTPs.    Assessment: Most likely we're dealing with osteoarthritis.  I do wish to rule out an inflammatory arthritis.    Plans:  1.  Laboratory studies and x-rays are obtained  2.  I changed her anti-inflammatory medicine back to diclofenac 75 mg twice daily  3.  Return to see me in 2 months

## 2018-03-22 ENCOUNTER — TELEPHONE (OUTPATIENT)
Dept: RHEUMATOLOGY | Facility: CLINIC | Age: 58
End: 2018-03-22

## 2018-03-22 NOTE — TELEPHONE ENCOUNTER
----- Message from Dottie Landa MA sent at 3/21/2018  2:12 PM CDT -----  Contact: self      ----- Message -----  From: Hillary Hoyos  Sent: 3/21/2018  10:43 AM  To: Mary MATIAS Staff    Pt called stating she is having an issue with diclofenac (VOLTAREN) 75 MG EC tablet and she would like to be prescribe a different medication.  [phone]

## 2018-03-22 NOTE — TELEPHONE ENCOUNTER
She has had some response to diclofenac but she is still taking Tylenol.  She is only been on it for 3 weeks.  I told her to give it another week and if it is still not helping I will change to a different anti-inflammatory medicine at that time.

## 2018-04-04 DIAGNOSIS — E55.9 VITAMIN D DEFICIENCY DISEASE: ICD-10-CM

## 2018-04-04 RX ORDER — ERGOCALCIFEROL 1.25 MG/1
CAPSULE ORAL
Qty: 12 CAPSULE | Refills: 0 | Status: SHIPPED | OUTPATIENT
Start: 2018-04-04 | End: 2018-05-09 | Stop reason: SDUPTHER

## 2018-04-05 DIAGNOSIS — M51.36 BULGING LUMBAR DISC: ICD-10-CM

## 2018-04-05 DIAGNOSIS — E05.90 HYPERTHYROIDISM: ICD-10-CM

## 2018-04-05 RX ORDER — DICLOFENAC SODIUM 75 MG/1
75 TABLET, DELAYED RELEASE ORAL 2 TIMES DAILY PRN
Qty: 60 TABLET | Refills: 3 | OUTPATIENT
Start: 2018-04-05 | End: 2018-05-05

## 2018-04-05 RX ORDER — SULINDAC 200 MG/1
200 TABLET ORAL 2 TIMES DAILY
Qty: 60 TABLET | Refills: 4 | Status: SHIPPED | OUTPATIENT
Start: 2018-04-05 | End: 2018-04-26 | Stop reason: SINTOL

## 2018-04-05 NOTE — TELEPHONE ENCOUNTER
----- Message from Dottie Landa MA sent at 4/5/2018  4:24 PM CDT -----  Pt states the pain, swelling in feet. Ankles are stiff. Knee pain. Taking 3 times a day for a week. She stopped the tylenol completely then started 3 times a day with diclofenac   Please advise.   ----- Message -----  From: Zain Hernandez MD  Sent: 4/5/2018   4:17 PM  To: Dottie Landa MA    I only prescribed diclofenac twice daily.  If she is taking it 3 times daily, I need to change her to something else.  It could cause liver problems at that dosage.

## 2018-04-05 NOTE — TELEPHONE ENCOUNTER
Has been taking diclofenac 3 times daily.  I told her this is too much.  I will change her to sulindac 200 mg twice daily.  She may take Tylenol along with the sulindac.

## 2018-04-06 RX ORDER — LEVOTHYROXINE SODIUM 100 UG/1
100 TABLET ORAL DAILY
Qty: 15 TABLET | Refills: 0 | Status: SHIPPED | OUTPATIENT
Start: 2018-04-06 | End: 2018-04-28 | Stop reason: SDUPTHER

## 2018-04-23 ENCOUNTER — TELEPHONE (OUTPATIENT)
Dept: RHEUMATOLOGY | Facility: CLINIC | Age: 58
End: 2018-04-23

## 2018-04-23 NOTE — TELEPHONE ENCOUNTER
----- Message from Dottie Landa MA sent at 4/23/2018  1:18 PM CDT -----  Contact: Pt      ----- Message -----  From: Ameya Velez  Sent: 4/23/2018   1:07 PM  To: Mary MATIAS Staff    Pt would like someone to call her regarding change of her medication sulindac (CLINORIL) 200 MG Tab    States it is causing swelling in her ankles, feet, and both knees. Also swelling in her right hand, and has not gone down yet.     States it also became worse with swelling in her right hand, turned colors. She would like someone to call her today regarding this matter.    Call her @ [phone]

## 2018-04-23 NOTE — TELEPHONE ENCOUNTER
Appears to be having a reaction to the sulindac.  I told her the stop the sulindac and given a 48 hours to get out of her system.  Once she is feeling better, I will try meloxicam.  If she is not better in 48 hours, she is to contact me and I will decide on further workup.

## 2018-04-26 ENCOUNTER — TELEPHONE (OUTPATIENT)
Dept: RHEUMATOLOGY | Facility: CLINIC | Age: 58
End: 2018-04-26

## 2018-04-26 RX ORDER — MELOXICAM 15 MG/1
15 TABLET ORAL DAILY
Qty: 30 TABLET | Refills: 6 | Status: SHIPPED | OUTPATIENT
Start: 2018-04-26 | End: 2018-12-01 | Stop reason: SDUPTHER

## 2018-04-26 NOTE — TELEPHONE ENCOUNTER
The reaction from the sulindac is almost gone.  Her pain is starting to come back.  I will place her on meloxicam 15 mg daily.  I told her to wait until tomorrow to start the medication.

## 2018-04-26 NOTE — TELEPHONE ENCOUNTER
----- Message from Dottie Landa MA sent at 4/26/2018  2:25 PM CDT -----  Contact: self       ----- Message -----  From: Nicolás Hart  Sent: 4/26/2018   1:51 PM  To: Mary MATIAS Staff    Pt is requesting a call back from physician to discuss medication he said he was going prescribe. Pt can be reached at [phone].

## 2018-04-28 DIAGNOSIS — E05.90 HYPERTHYROIDISM: ICD-10-CM

## 2018-04-30 RX ORDER — LEVOTHYROXINE SODIUM 100 UG/1
TABLET ORAL
Qty: 7 TABLET | Refills: 0 | Status: SHIPPED | OUTPATIENT
Start: 2018-04-30 | End: 2018-05-09

## 2018-05-09 ENCOUNTER — OFFICE VISIT (OUTPATIENT)
Dept: INTERNAL MEDICINE | Facility: CLINIC | Age: 58
End: 2018-05-09
Payer: COMMERCIAL

## 2018-05-09 ENCOUNTER — OFFICE VISIT (OUTPATIENT)
Dept: RHEUMATOLOGY | Facility: CLINIC | Age: 58
End: 2018-05-09
Payer: COMMERCIAL

## 2018-05-09 ENCOUNTER — LAB VISIT (OUTPATIENT)
Dept: LAB | Facility: HOSPITAL | Age: 58
End: 2018-05-09
Attending: INTERNAL MEDICINE
Payer: COMMERCIAL

## 2018-05-09 VITALS
DIASTOLIC BLOOD PRESSURE: 90 MMHG | HEIGHT: 62 IN | HEART RATE: 93 BPM | SYSTOLIC BLOOD PRESSURE: 136 MMHG | WEIGHT: 151.44 LBS | BODY MASS INDEX: 27.87 KG/M2

## 2018-05-09 VITALS
DIASTOLIC BLOOD PRESSURE: 87 MMHG | SYSTOLIC BLOOD PRESSURE: 123 MMHG | HEART RATE: 79 BPM | WEIGHT: 141 LBS | BODY MASS INDEX: 25.79 KG/M2

## 2018-05-09 DIAGNOSIS — I10 ESSENTIAL HYPERTENSION: ICD-10-CM

## 2018-05-09 DIAGNOSIS — M05.79 SEROPOSITIVE RHEUMATOID ARTHRITIS OF MULTIPLE SITES: ICD-10-CM

## 2018-05-09 DIAGNOSIS — E03.9 HYPOTHYROIDISM, UNSPECIFIED TYPE: Primary | ICD-10-CM

## 2018-05-09 DIAGNOSIS — D37.4 POLYP OF COLON, VILLOUS ADENOMA: ICD-10-CM

## 2018-05-09 DIAGNOSIS — E55.9 VITAMIN D DEFICIENCY DISEASE: ICD-10-CM

## 2018-05-09 DIAGNOSIS — M05.79 SEROPOSITIVE RHEUMATOID ARTHRITIS OF MULTIPLE SITES: Primary | ICD-10-CM

## 2018-05-09 LAB
ALBUMIN SERPL BCP-MCNC: 3.7 G/DL
ALP SERPL-CCNC: 148 U/L
ALT SERPL W/O P-5'-P-CCNC: 11 U/L
ANION GAP SERPL CALC-SCNC: 17 MMOL/L
AST SERPL-CCNC: 8 U/L
BASOPHILS # BLD AUTO: 0.08 K/UL
BASOPHILS NFR BLD: 0.7 %
BILIRUB SERPL-MCNC: 0.5 MG/DL
BUN SERPL-MCNC: 11 MG/DL
CALCIUM SERPL-MCNC: 9.6 MG/DL
CHLORIDE SERPL-SCNC: 104 MMOL/L
CO2 SERPL-SCNC: 22 MMOL/L
CREAT SERPL-MCNC: 0.8 MG/DL
CRP SERPL-MCNC: 22 MG/L
DIFFERENTIAL METHOD: ABNORMAL
EOSINOPHIL # BLD AUTO: 0.3 K/UL
EOSINOPHIL NFR BLD: 2.5 %
ERYTHROCYTE [DISTWIDTH] IN BLOOD BY AUTOMATED COUNT: 14.1 %
ERYTHROCYTE [SEDIMENTATION RATE] IN BLOOD BY WESTERGREN METHOD: 53 MM/HR
EST. GFR  (AFRICAN AMERICAN): >60 ML/MIN/1.73 M^2
EST. GFR  (NON AFRICAN AMERICAN): >60 ML/MIN/1.73 M^2
GLUCOSE SERPL-MCNC: 84 MG/DL
HBV CORE AB SERPL QL IA: NEGATIVE
HBV SURFACE AB SER-ACNC: NEGATIVE M[IU]/ML
HBV SURFACE AG SERPL QL IA: NEGATIVE
HCT VFR BLD AUTO: 40.5 %
HCV AB SERPL QL IA: POSITIVE
HGB BLD-MCNC: 12.8 G/DL
HIV 1+2 AB+HIV1 P24 AG SERPL QL IA: NEGATIVE
IMM GRANULOCYTES # BLD AUTO: 0.04 K/UL
IMM GRANULOCYTES NFR BLD AUTO: 0.3 %
LYMPHOCYTES # BLD AUTO: 2.6 K/UL
LYMPHOCYTES NFR BLD: 21.3 %
MCH RBC QN AUTO: 28.8 PG
MCHC RBC AUTO-ENTMCNC: 31.6 G/DL
MCV RBC AUTO: 91 FL
MONOCYTES # BLD AUTO: 1 K/UL
MONOCYTES NFR BLD: 8.4 %
NEUTROPHILS # BLD AUTO: 8 K/UL
NEUTROPHILS NFR BLD: 66.8 %
NRBC BLD-RTO: 0 /100 WBC
PLATELET # BLD AUTO: 437 K/UL
PMV BLD AUTO: 9.4 FL
POTASSIUM SERPL-SCNC: 4.2 MMOL/L
PROT SERPL-MCNC: 7.6 G/DL
RBC # BLD AUTO: 4.44 M/UL
RPR SER QL: NORMAL
SODIUM SERPL-SCNC: 143 MMOL/L
WBC # BLD AUTO: 11.97 K/UL

## 2018-05-09 PROCEDURE — 96372 THER/PROPH/DIAG INJ SC/IM: CPT | Mod: S$GLB,,, | Performed by: INTERNAL MEDICINE

## 2018-05-09 PROCEDURE — 86803 HEPATITIS C AB TEST: CPT

## 2018-05-09 PROCEDURE — 87340 HEPATITIS B SURFACE AG IA: CPT

## 2018-05-09 PROCEDURE — 36415 COLL VENOUS BLD VENIPUNCTURE: CPT

## 2018-05-09 PROCEDURE — 99214 OFFICE O/P EST MOD 30 MIN: CPT | Mod: 25,S$GLB,, | Performed by: INTERNAL MEDICINE

## 2018-05-09 PROCEDURE — 3008F BODY MASS INDEX DOCD: CPT | Mod: CPTII,S$GLB,, | Performed by: STUDENT IN AN ORGANIZED HEALTH CARE EDUCATION/TRAINING PROGRAM

## 2018-05-09 PROCEDURE — 3075F SYST BP GE 130 - 139MM HG: CPT | Mod: CPTII,S$GLB,, | Performed by: STUDENT IN AN ORGANIZED HEALTH CARE EDUCATION/TRAINING PROGRAM

## 2018-05-09 PROCEDURE — 85025 COMPLETE CBC W/AUTO DIFF WBC: CPT

## 2018-05-09 PROCEDURE — 99204 OFFICE O/P NEW MOD 45 MIN: CPT | Mod: S$GLB,,, | Performed by: STUDENT IN AN ORGANIZED HEALTH CARE EDUCATION/TRAINING PROGRAM

## 2018-05-09 PROCEDURE — 80053 COMPREHEN METABOLIC PANEL: CPT

## 2018-05-09 PROCEDURE — 99999 PR PBB SHADOW E&M-EST. PATIENT-LVL III: CPT | Mod: PBBFAC,,, | Performed by: INTERNAL MEDICINE

## 2018-05-09 PROCEDURE — 86703 HIV-1/HIV-2 1 RESULT ANTBDY: CPT

## 2018-05-09 PROCEDURE — 86706 HEP B SURFACE ANTIBODY: CPT

## 2018-05-09 PROCEDURE — 86140 C-REACTIVE PROTEIN: CPT

## 2018-05-09 PROCEDURE — 3078F DIAST BP <80 MM HG: CPT | Mod: CPTII,S$GLB,, | Performed by: STUDENT IN AN ORGANIZED HEALTH CARE EDUCATION/TRAINING PROGRAM

## 2018-05-09 PROCEDURE — 87522 HEPATITIS C REVRS TRNSCRPJ: CPT

## 2018-05-09 PROCEDURE — 85651 RBC SED RATE NONAUTOMATED: CPT

## 2018-05-09 PROCEDURE — 86704 HEP B CORE ANTIBODY TOTAL: CPT

## 2018-05-09 PROCEDURE — 99999 PR PBB SHADOW E&M-EST. PATIENT-LVL III: CPT | Mod: PBBFAC,,, | Performed by: STUDENT IN AN ORGANIZED HEALTH CARE EDUCATION/TRAINING PROGRAM

## 2018-05-09 PROCEDURE — 3008F BODY MASS INDEX DOCD: CPT | Mod: CPTII,S$GLB,, | Performed by: INTERNAL MEDICINE

## 2018-05-09 PROCEDURE — 86592 SYPHILIS TEST NON-TREP QUAL: CPT

## 2018-05-09 RX ORDER — METHOTREXATE 2.5 MG/1
15 TABLET ORAL
Qty: 30 TABLET | Refills: 1 | Status: SHIPPED | OUTPATIENT
Start: 2018-05-09 | End: 2018-07-18 | Stop reason: SDUPTHER

## 2018-05-09 RX ORDER — FOLIC ACID 1 MG/1
1 TABLET ORAL DAILY
Qty: 30 TABLET | Refills: 11 | Status: SHIPPED | OUTPATIENT
Start: 2018-05-09 | End: 2019-03-14 | Stop reason: SDUPTHER

## 2018-05-09 RX ORDER — LEVOTHYROXINE SODIUM 100 UG/1
100 TABLET ORAL DAILY
Qty: 30 TABLET | Refills: 11 | Status: SHIPPED | OUTPATIENT
Start: 2018-05-09 | End: 2019-05-22 | Stop reason: SDUPTHER

## 2018-05-09 RX ORDER — TRIAMCINOLONE ACETONIDE 40 MG/ML
80 INJECTION, SUSPENSION INTRA-ARTICULAR; INTRAMUSCULAR
Status: COMPLETED | OUTPATIENT
Start: 2018-05-09 | End: 2018-05-09

## 2018-05-09 RX ORDER — AMLODIPINE BESYLATE 5 MG/1
5 TABLET ORAL DAILY
Qty: 30 TABLET | Refills: 11 | Status: SHIPPED | OUTPATIENT
Start: 2018-05-09 | End: 2018-10-23

## 2018-05-09 RX ORDER — ERGOCALCIFEROL 1.25 MG/1
50000 CAPSULE ORAL
Qty: 12 CAPSULE | Refills: 0 | Status: SHIPPED | OUTPATIENT
Start: 2018-05-09 | End: 2018-09-27 | Stop reason: SDUPTHER

## 2018-05-09 RX ADMIN — TRIAMCINOLONE ACETONIDE 80 MG: 40 INJECTION, SUSPENSION INTRA-ARTICULAR; INTRAMUSCULAR at 10:05

## 2018-05-09 NOTE — PROGRESS NOTES
History of present illness: 58-year-old female I saw initially 2 she has chronic low back pain.  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  At the time of her last visit she had no evidence of synovitis.  I placed her on diclofenac.  That did not help.  I changed the sulindac and she had a reaction.  She is now on meloxicam with some relief.  Laboratory studies revealed positive rheumatoid factor and CCP antibody.  She had normal inflammatory markers.  She comes back today for follow-up.    She has been having pain primarily in her knees and feet.  She has also had pain and swelling in her fingers.  She states the pain is bad in the morning.  She has up to 4 hours of morning stiffness.  The pain is bad at the end of the day.  She has some nocturnal pain.  She has been taking Tylenol with some relief.  Topical medications have been helping.  She has had no other recent medical problems.    Physical examination:  Musculoskeletal: Cervical spine is unremarkable.  She has pain on range of motion of the right shoulder.  She is tender over the acromioclavicular joint.  Left shoulder is unremarkable.  Elbows and wrist are unremarkable.  She has synovitis of the left second MCP and the right third PIP.  Hips and knees are moving normally.  She has no effusion in the knee.  She does have synovitis of the ankles.  She is tender across the MTPs.  Laboratory:  Rheumatoid factor 226, CCP is 151.  CRP was 11  Radiology: No evidence of erosive disease    Assessment: Seropositive rheumatoid arthritis    Plans: I discussed with her various options.  1.  I placed her on methotrexate 15 mg weekly.  I discussed potential side effects.  2.  I placed her on folic acid 1 mg daily  3.  I gave her Kenalog 80 mg IM  4.  Continue meloxicam as before  5.  Laboratory studies are obtained  6.  I referred her to infectious disease for immunization evaluation  7.  Return to see me in one month

## 2018-05-09 NOTE — PROGRESS NOTES
INTERNAL MEDICINE RESIDENT [facility]INIC  CLINIC NOTE    Patient Name: Darlyn Donato  YOB: 1960    PRESENTING HISTORY       History of Present Illness:  Ms. Darlyn Donato is a 58 y.o. female with chronic bronchitis (PRN proair), former smoker with 30 pack-year hx (quit 10 years ago), osteopenia, hx 15 mm tubular adenoma with focal villous change (2013), hypothyroidism (on 100 mcg levothyroxine daily), new dx of RA (on MTX), presents for annual exam / establish care.    She says that she has generally felt well but feels worried about her recent diagnosis of RA. She says that she has had pain in her hands and feet with morning stiffness and decreased pain throughout the day but is worried about being on an immunosuppressant medicine. She says that she is otherwise healthy and most recently had a cold last fall, but otherwise no major illnesses.    For her chronic bronchitis she takes proair PRN; she had previously (several years prior) seen a pulmonologist and was given Breo, but this was discontinued as she was told her disease was not severe enough to require it.    Denies any chest pain, SOB, decreased exercise tolerance, changes in bowel habits/stool caliber, body aches, fever/chills, weight loss, night sweats, or other complaints.    Today her blood pressure is elevated at 146/90, repeat 136/90; review of this year's blood pressure shows persistent elevation near 140s.    Review of Systems   Constitutional: Negative for chills, fever and weight loss.   Eyes: Negative for blurred vision.   Respiratory: Negative for cough, sputum production, shortness of breath and wheezing.    Cardiovascular: Negative for chest pain.   Gastrointestinal: Negative for nausea and vomiting.   Genitourinary: Negative for dysuria and hematuria.   Musculoskeletal: Positive for back pain, joint pain and myalgias.   Skin: Negative for itching and rash.   Neurological: Negative for dizziness and headaches.   Endo/Heme/Allergies:  "Does not bruise/bleed easily.         PAST HISTORY:     Past Medical History:   Diagnosis Date    Depression     Elevated alkaline phosphatase level     Hyperlipidemia     Osteopenia     Personal history of female infertility     Respiratory distress     " stopped Breathing" after 2 pain meds were given in a close time frame to one another.    Thyroid disease     multinodular goiter    Vitamin D deficiency disease        Past Surgical History:   Procedure Laterality Date    BREAST SURGERY      benign biopsy on left    LAPAROSCOPY W/ MINI-LAPAROTOMY      for fertility issues    WA REMOVAL OF OVARY/TUBE(S)      RECTAL SURGERY      excess skin growth removal    supracervical hysterectomy      TONSILLECTOMY         Family History   Problem Relation Age of Onset    Breast cancer Mother     Hyperlipidemia Mother     Emphysema Father     Colon cancer Neg Hx     Ovarian cancer Neg Hx     Melanoma Neg Hx        Social History     Social History    Marital status:      Spouse name: N/A    Number of children: 0    Years of education: some colle     Occupational History     law firm [employer]     Social History Main Topics    Smoking status: Former Smoker     Types: Cigarettes    Smokeless tobacco: Former User     Quit date: 8/22/1997    Alcohol use Yes      Comment: Rare    Drug use: No    Sexual activity: Yes     Partners: Male     Other Topics Concern    None     Social History Narrative    Adult Screenings    Mammogram( for females) done 4/2011    Pap ( for females)? Needs referral to  GYN    Colonoscopy age  52-Not done yet    Flu shot yearly to be done today  1/9/13    Td ?    Pneumovax recommended one time  at age  65    Zostavax recommended one time at  age  62    Eye exam recommended yearly- not done yet     Bone density April 2011-osteopenia       MEDICATIONS & ALLERGIES:     Current Outpatient Prescriptions on File Prior to Visit   Medication Sig    albuterol " "(PROVENTIL HFA/VENTOLIN HFA) 200 puff inhaler Inhale 2 puffs into the lungs every 4 (four) hours as needed. 2 HFA Aerosol Inhaler Inhalation Every 4-6 hours    ASPIRIN/CAFFEINE/DIHYDROCODEIN (DIHYDROCODEINE-ASPIRIN-CAFF ORAL) Take by mouth.    estradiol (ESTRACE) 0.01 % (0.1 mg/gram) vaginal cream Place 0.5 g vaginally every other day.    folic acid (FOLVITE) 1 MG tablet Take 1 tablet (1 mg total) by mouth once daily.    meloxicam (MOBIC) 15 MG tablet Take 1 tablet (15 mg total) by mouth once daily.    methotrexate 2.5 MG Tab Take 6 tablets (15 mg total) by mouth every 7 days.    [DISCONTINUED] cyanocobalamin, vitamin B-12, (VITAMIN B-12) 1,000 mcg TbSR Take by mouth once daily.    [DISCONTINUED] ergocalciferol (ERGOCALCIFEROL) 50,000 unit Cap TAKE 1 CAPSULE BY MOUTH EVERY 7 DAYS    [DISCONTINUED] LEVOXYL 100 mcg tablet TAKE 1 TABLET BY MOUTH ONCE DAILY; NEEDA APPOINTMENT BEFORE ADDITIONAL REFILLS PROVIDED     Current Facility-Administered Medications on File Prior to Visit   Medication    [COMPLETED] triamcinolone acetonide injection 80 mg       Review of patient's allergies indicates:   Allergen Reactions    Hydromorphone Shortness Of Breath    Percocet  [oxycodone-acetaminophen] Itching and Nausea Only    Vicodin  [hydrocodone-acetaminophen] Nausea Only and Nausea And Vomiting       OBJECTIVE:   Vital Signs:  Vitals:    05/09/18 1313 05/09/18 1340   BP: (!) 140/92 (!) 136/90   Pulse: 93    Weight: 68.7 kg (151 lb 7.3 oz)    Height: 5' 2" (1.575 m)        Recent Results (from the past 24 hour(s))   CBC auto differential    Collection Time: 05/09/18 11:20 AM   Result Value Ref Range    WBC 11.97 3.90 - 12.70 K/uL    RBC 4.44 4.00 - 5.40 M/uL    Hemoglobin 12.8 12.0 - 16.0 g/dL    Hematocrit 40.5 37.0 - 48.5 %    MCV 91 82 - 98 fL    MCH 28.8 27.0 - 31.0 pg    MCHC 31.6 (L) 32.0 - 36.0 g/dL    RDW 14.1 11.5 - 14.5 %    Platelets 437 (H) 150 - 350 K/uL    MPV 9.4 9.2 - 12.9 fL    Immature Granulocytes 0.3 " 0.0 - 0.5 %    Gran # (ANC) 8.0 (H) 1.8 - 7.7 K/uL    Immature Grans (Abs) 0.04 0.00 - 0.04 K/uL    Lymph # 2.6 1.0 - 4.8 K/uL    Mono # 1.0 0.3 - 1.0 K/uL    Eos # 0.3 0.0 - 0.5 K/uL    Baso # 0.08 0.00 - 0.20 K/uL    nRBC 0 0 /100 WBC    Gran% 66.8 38.0 - 73.0 %    Lymph% 21.3 18.0 - 48.0 %    Mono% 8.4 4.0 - 15.0 %    Eosinophil% 2.5 0.0 - 8.0 %    Basophil% 0.7 0.0 - 1.9 %    Differential Method Automated    Comprehensive metabolic panel    Collection Time: 05/09/18 11:20 AM   Result Value Ref Range    Sodium 143 136 - 145 mmol/L    Potassium 4.2 3.5 - 5.1 mmol/L    Chloride 104 95 - 110 mmol/L    CO2 22 (L) 23 - 29 mmol/L    Glucose 84 70 - 110 mg/dL    BUN, Bld 11 6 - 20 mg/dL    Creatinine 0.8 0.5 - 1.4 mg/dL    Calcium 9.6 8.7 - 10.5 mg/dL    Total Protein 7.6 6.0 - 8.4 g/dL    Albumin 3.7 3.5 - 5.2 g/dL    Total Bilirubin 0.5 0.1 - 1.0 mg/dL    Alkaline Phosphatase 148 (H) 55 - 135 U/L    AST 8 (L) 10 - 40 U/L    ALT 11 10 - 44 U/L    Anion Gap 17 (H) 8 - 16 mmol/L    eGFR if African American >60.0 >60 mL/min/1.73 m^2    eGFR if non African American >60.0 >60 mL/min/1.73 m^2   C-reactive protein    Collection Time: 05/09/18 11:20 AM   Result Value Ref Range    CRP 22.0 (H) 0.0 - 8.2 mg/L         Physical Exam   Constitutional: She appears well-developed.   HENT:   Head: Normocephalic and atraumatic.   Right Ear: External ear normal.   Left Ear: External ear normal.   Nose: Nose normal.   Eyes: EOM are normal. Pupils are equal, round, and reactive to light.   Neck: No tracheal deviation present. No thyromegaly present.   Cardiovascular: Normal rate.    No murmur heard.  Pulmonary/Chest: Effort normal and breath sounds normal.   Abdominal: Soft. There is no tenderness. No hernia.   Musculoskeletal: She exhibits no edema.   Neurological: She displays normal reflexes. No cranial nerve deficit.   Skin: No rash noted.   Psychiatric: She has a normal mood and affect. Judgment normal.   Vitals  reviewed.        ASSESSMENT & PLAN:     Darlyn was seen today for annual exam.    Diagnoses and all orders for this visit:    Hypothyroidism, unspecified type  -     TSH; Future  -     levothyroxine (SYNTHROID) 100 MCG tablet; Take 1 tablet (100 mcg total) by mouth once daily.    Vitamin D deficiency disease  -     ergocalciferol (ERGOCALCIFEROL) 50,000 unit Cap; Take 1 capsule (50,000 Units total) by mouth every 7 days.    Polyp of colon, villous adenoma  -     Case request GI: COLONOSCOPY    Essential hypertension  -     amLODIPine (NORVASC) 5 MG tablet; Take 1 tablet (5 mg total) by mouth once daily.      Get TSH / addon today, rewrote synthroid  Get Vit D, rewrote vit D replacement  Advised pt to get c-scope as last one was in 2013 with 15 mm tubular adenoma with villous focal change    Return in 6 months    Roseann Mann MD  Internal Medicine PGY-1  [phone]

## 2018-05-11 ENCOUNTER — TELEPHONE (OUTPATIENT)
Dept: ENDOSCOPY | Facility: HOSPITAL | Age: 58
End: 2018-05-11

## 2018-05-11 LAB
HCV LOG: <1.08 LOG (10) IU/ML
HCV RNA QUANT PCR: <12 IU/ML
HCV, QUALITATIVE: NOT DETECTED IU/ML

## 2018-05-14 ENCOUNTER — CLINICAL SUPPORT (OUTPATIENT)
Dept: INFECTIOUS DISEASES | Facility: CLINIC | Age: 58
End: 2018-05-14
Payer: COMMERCIAL

## 2018-05-14 ENCOUNTER — OFFICE VISIT (OUTPATIENT)
Dept: INFECTIOUS DISEASES | Facility: CLINIC | Age: 58
End: 2018-05-14
Payer: COMMERCIAL

## 2018-05-14 VITALS
HEIGHT: 62 IN | WEIGHT: 146.38 LBS | HEART RATE: 73 BPM | SYSTOLIC BLOOD PRESSURE: 115 MMHG | TEMPERATURE: 98 F | DIASTOLIC BLOOD PRESSURE: 78 MMHG | BODY MASS INDEX: 26.94 KG/M2

## 2018-05-14 DIAGNOSIS — D84.9 IMMUNOSUPPRESSION: Primary | ICD-10-CM

## 2018-05-14 DIAGNOSIS — M05.742 RHEUMATOID ARTHRITIS INVOLVING BOTH HANDS WITH POSITIVE RHEUMATOID FACTOR: ICD-10-CM

## 2018-05-14 DIAGNOSIS — M05.741 RHEUMATOID ARTHRITIS INVOLVING BOTH HANDS WITH POSITIVE RHEUMATOID FACTOR: ICD-10-CM

## 2018-05-14 PROBLEM — M06.9 RHEUMATOID ARTHRITIS: Status: ACTIVE | Noted: 2018-05-14

## 2018-05-14 PROCEDURE — 90636 HEP A/HEP B VACC ADULT IM: CPT | Mod: S$GLB,,, | Performed by: INTERNAL MEDICINE

## 2018-05-14 PROCEDURE — 90715 TDAP VACCINE 7 YRS/> IM: CPT | Mod: S$GLB,,, | Performed by: INTERNAL MEDICINE

## 2018-05-14 PROCEDURE — 90736 HZV VACCINE LIVE SUBQ: CPT | Mod: S$GLB,,, | Performed by: INTERNAL MEDICINE

## 2018-05-14 PROCEDURE — 99204 OFFICE O/P NEW MOD 45 MIN: CPT | Mod: S$GLB,,, | Performed by: INTERNAL MEDICINE

## 2018-05-14 PROCEDURE — 90670 PCV13 VACCINE IM: CPT | Mod: S$GLB,,, | Performed by: INTERNAL MEDICINE

## 2018-05-14 PROCEDURE — 90472 IMMUNIZATION ADMIN EACH ADD: CPT | Mod: S$GLB,,, | Performed by: INTERNAL MEDICINE

## 2018-05-14 PROCEDURE — 90471 IMMUNIZATION ADMIN: CPT | Mod: S$GLB,,, | Performed by: INTERNAL MEDICINE

## 2018-05-14 PROCEDURE — 90686 IIV4 VACC NO PRSV 0.5 ML IM: CPT | Mod: S$GLB,,, | Performed by: INTERNAL MEDICINE

## 2018-05-14 PROCEDURE — 99999 PR PBB SHADOW E&M-EST. PATIENT-LVL III: CPT | Mod: PBBFAC,,, | Performed by: INTERNAL MEDICINE

## 2018-05-14 PROCEDURE — 3008F BODY MASS INDEX DOCD: CPT | Mod: CPTII,S$GLB,, | Performed by: INTERNAL MEDICINE

## 2018-05-14 NOTE — PROGRESS NOTES
"Infectious Diseases Clinic Note    Subjective:       Patient ID: Darlyn Donato is a 58 y.o. female.    Chief Complaint: No chief complaint on file.    HPI    57 y/o F h/o Sero+ RA on MTX here for vaccine eval    L[place] [address] with  and mother, no pets  No travel outside of [place]  No known tb exposures  occ ETOH, ex smoker, no illicits  Works as   No hunting fishing  No raw food     Past Medical History:   Diagnosis Date    Depression     Elevated alkaline phosphatase level     Hyperlipidemia     Osteopenia     Personal history of female infertility     Respiratory distress     " stopped Breathing" after 2 pain meds were given in a close time frame to one another.    Thyroid disease     multinodular goiter    Vitamin D deficiency disease        Social History     Social History    Marital status:      Spouse name: N/A    Number of children: 0    Years of education: some colle     Occupational History     [employer]     Social History Main Topics    Smoking status: Former Smoker     Types: Cigarettes    Smokeless tobacco: Former User     Quit date: 8/22/1997    Alcohol use Yes      Comment: Rare    Drug use: No    Sexual activity: Yes     Partners: Male     Other Topics Concern    Not on file     Social History Narrative    Adult Screenings    Mammogram( for females) done 4/2011    Pap ( for females)? Needs referral to  GYN    Colonoscopy age  52-Not done yet    Flu shot yearly to be done today  1/9/13    Td ?    Pneumovax recommended one time  at age  67    Zostavax recommended one time at  age  62    Eye exam recommended yearly- not done yet     Bone density April 2011-osteopenia         Current Outpatient Prescriptions:     albuterol (PROVENTIL HFA/VENTOLIN HFA) 200 puff inhaler, Inhale 2 puffs into the lungs every 4 (four) hours as needed. 2 HFA Aerosol Inhaler Inhalation Every 4-6 hours, Disp: , Rfl:     amLODIPine (NORVASC) 5 MG " tablet, Take 1 tablet (5 mg total) by mouth once daily., Disp: 30 tablet, Rfl: 11    ergocalciferol (ERGOCALCIFEROL) 50,000 unit Cap, Take 1 capsule (50,000 Units total) by mouth every 7 days., Disp: 12 capsule, Rfl: 0    folic acid (FOLVITE) 1 MG tablet, Take 1 tablet (1 mg total) by mouth once daily., Disp: 30 tablet, Rfl: 11    levothyroxine (SYNTHROID) 100 MCG tablet, Take 1 tablet (100 mcg total) by mouth once daily., Disp: 30 tablet, Rfl: 11    meloxicam (MOBIC) 15 MG tablet, Take 1 tablet (15 mg total) by mouth once daily., Disp: 30 tablet, Rfl: 6    methotrexate 2.5 MG Tab, Take 6 tablets (15 mg total) by mouth every 7 days., Disp: 30 tablet, Rfl: 1    ASPIRIN/CAFFEINE/DIHYDROCODEIN (DIHYDROCODEINE-ASPIRIN-CAFF ORAL), Take by mouth., Disp: , Rfl:     Review of Systems   Constitutional: Negative for activity change, chills and fever.   HENT: Negative for congestion, mouth sores, rhinorrhea, sinus pressure and sore throat.    Eyes: Negative for photophobia, pain and redness.   Respiratory: Negative for cough, chest tightness, shortness of breath and wheezing.    Cardiovascular: Negative for chest pain and leg swelling.   Gastrointestinal: Negative for abdominal distention, abdominal pain, diarrhea, nausea and vomiting.   Endocrine: Negative for polyuria.   Genitourinary: Negative for decreased urine volume, dysuria and flank pain.   Musculoskeletal: Negative for joint swelling and neck pain.   Skin: Negative for color change.   Allergic/Immunologic: Negative for food allergies.   Neurological: Negative for dizziness, weakness and headaches.   Hematological: Negative for adenopathy.   Psychiatric/Behavioral: Negative for agitation and confusion. The patient is not nervous/anxious.            Objective:      Vitals:    05/14/18 1506   BP: 115/78   Pulse: 73   Temp: 98.1 °F (36.7 °C)     Physical Exam   Constitutional: She is oriented to person, place, and time. She appears well-developed and  well-nourished. No distress.   HENT:   Head: Normocephalic and atraumatic.   Mouth/Throat: Oropharynx is clear and moist.   Eyes: Conjunctivae and EOM are normal. No scleral icterus.   Neck: Normal range of motion. Neck supple.   Cardiovascular: Normal rate and regular rhythm.    No murmur heard.  Pulmonary/Chest: Effort normal and breath sounds normal. No respiratory distress. She has no wheezes.   Abdominal: Soft. Bowel sounds are normal. She exhibits no distension.   Musculoskeletal: Normal range of motion. She exhibits no edema or tenderness.   Lymphadenopathy:     She has no cervical adenopathy.   Neurological: She is alert and oriented to person, place, and time. Coordination normal.   Skin: Skin is warm and dry. No rash noted. No erythema.   Psychiatric: She has a normal mood and affect. Her behavior is normal.         HCV ab positive, PCR negative  RPR negative  HBsAb negative, HBcAb negative  HBsAg negative  HIV negative  Quant told pending    Assessment/Plan:         No diagnosis found.      57 y/o F h/o Sero+ RA on MTX here for vaccine eval  Flu vaccine  tdap  prevnar f/b pneumovax after 8 weeks  twinrix series  shingrix series  Counseled on safe living

## 2018-05-14 NOTE — LETTER
May 14, 2018      Zain Hernandez MD  [address]  [place] [address]           [facility] - Infectious Diseases  [address]  [facility] [address]  Phone: [phone]  Fax: [phone]          Patient: Darlyn Donato   MR Number: [id number]   YOB: 1960   Date of Visit: 5/14/2018       Dear Dr. Zain Hernandez:    Thank you for referring Darlyn Donato to me for evaluation. Attached you will find relevant portions of my assessment and plan of care.    If you have questions, please do not hesitate to call me. I look forward to following Darlyn Donato along with you.    Sincerely,    David Mcdonald MD    Enclosure  CC:  No Recipients    If you would like to receive this communication electronically, please contact [email or web address] or [phone] to request more information on [facility] access.    For providers and/or their staff who would like to refer a patient to [facility], please contact us through our one-stop-shop provider referral line, [facility], at [phone].    If you feel you have received this communication in error or would no longer like to receive these types of communications, please e-mail [email or web address]

## 2018-05-14 NOTE — PROGRESS NOTES
Pt received her immunizations (Hepatitis A/B, Prevnar 13, Tdap, Shingrix, and Influenza). Pt tolerated the injections well. Return appts provided. Pt left the unit in NAD.

## 2018-05-21 PROBLEM — J01.40 ACUTE NON-RECURRENT PANSINUSITIS: Status: RESOLVED | Noted: 2018-02-16 | Resolved: 2018-05-21

## 2018-06-08 ENCOUNTER — LAB VISIT (OUTPATIENT)
Dept: LAB | Facility: HOSPITAL | Age: 58
End: 2018-06-08
Attending: INTERNAL MEDICINE
Payer: COMMERCIAL

## 2018-06-08 ENCOUNTER — OFFICE VISIT (OUTPATIENT)
Dept: RHEUMATOLOGY | Facility: CLINIC | Age: 58
End: 2018-06-08
Payer: COMMERCIAL

## 2018-06-08 VITALS
HEART RATE: 73 BPM | HEIGHT: 62 IN | BODY MASS INDEX: 26.31 KG/M2 | SYSTOLIC BLOOD PRESSURE: 119 MMHG | WEIGHT: 143 LBS | DIASTOLIC BLOOD PRESSURE: 87 MMHG

## 2018-06-08 DIAGNOSIS — M05.79 SEROPOSITIVE RHEUMATOID ARTHRITIS OF MULTIPLE SITES: ICD-10-CM

## 2018-06-08 DIAGNOSIS — M05.79 RHEUMATOID ARTHRITIS INVOLVING MULTIPLE SITES WITH POSITIVE RHEUMATOID FACTOR: Primary | ICD-10-CM

## 2018-06-08 DIAGNOSIS — E03.9 HYPOTHYROIDISM, UNSPECIFIED TYPE: ICD-10-CM

## 2018-06-08 DIAGNOSIS — Z79.60 LONG-TERM USE OF IMMUNOSUPPRESSANT MEDICATION: ICD-10-CM

## 2018-06-08 LAB
ALBUMIN SERPL BCP-MCNC: 3.4 G/DL
ALP SERPL-CCNC: 140 U/L
ALT SERPL W/O P-5'-P-CCNC: 11 U/L
ANION GAP SERPL CALC-SCNC: 10 MMOL/L
AST SERPL-CCNC: 7 U/L
BASOPHILS # BLD AUTO: 0.05 K/UL
BASOPHILS NFR BLD: 0.5 %
BILIRUB SERPL-MCNC: 0.2 MG/DL
BUN SERPL-MCNC: 10 MG/DL
CALCIUM SERPL-MCNC: 9 MG/DL
CHLORIDE SERPL-SCNC: 108 MMOL/L
CO2 SERPL-SCNC: 27 MMOL/L
CREAT SERPL-MCNC: 0.8 MG/DL
CRP SERPL-MCNC: 8.9 MG/L
DIFFERENTIAL METHOD: ABNORMAL
EOSINOPHIL # BLD AUTO: 0.3 K/UL
EOSINOPHIL NFR BLD: 3.1 %
ERYTHROCYTE [DISTWIDTH] IN BLOOD BY AUTOMATED COUNT: 14.4 %
ERYTHROCYTE [SEDIMENTATION RATE] IN BLOOD BY WESTERGREN METHOD: 29 MM/HR
EST. GFR  (AFRICAN AMERICAN): >60 ML/MIN/1.73 M^2
EST. GFR  (NON AFRICAN AMERICAN): >60 ML/MIN/1.73 M^2
GLUCOSE SERPL-MCNC: 89 MG/DL
HCT VFR BLD AUTO: 41.5 %
HGB BLD-MCNC: 13.1 G/DL
IMM GRANULOCYTES # BLD AUTO: 0.04 K/UL
IMM GRANULOCYTES NFR BLD AUTO: 0.4 %
LYMPHOCYTES # BLD AUTO: 3 K/UL
LYMPHOCYTES NFR BLD: 29 %
MCH RBC QN AUTO: 29.8 PG
MCHC RBC AUTO-ENTMCNC: 31.6 G/DL
MCV RBC AUTO: 94 FL
MONOCYTES # BLD AUTO: 0.7 K/UL
MONOCYTES NFR BLD: 6.9 %
NEUTROPHILS # BLD AUTO: 6.1 K/UL
NEUTROPHILS NFR BLD: 60.1 %
NRBC BLD-RTO: 0 /100 WBC
PLATELET # BLD AUTO: 365 K/UL
PMV BLD AUTO: 9.4 FL
POTASSIUM SERPL-SCNC: 4.2 MMOL/L
PROT SERPL-MCNC: 7 G/DL
RBC # BLD AUTO: 4.4 M/UL
SODIUM SERPL-SCNC: 145 MMOL/L
TSH SERPL DL<=0.005 MIU/L-ACNC: 1.42 UIU/ML
WBC # BLD AUTO: 10.16 K/UL

## 2018-06-08 PROCEDURE — 36415 COLL VENOUS BLD VENIPUNCTURE: CPT

## 2018-06-08 PROCEDURE — 86140 C-REACTIVE PROTEIN: CPT

## 2018-06-08 PROCEDURE — 99213 OFFICE O/P EST LOW 20 MIN: CPT | Mod: S$GLB,,, | Performed by: INTERNAL MEDICINE

## 2018-06-08 PROCEDURE — 85651 RBC SED RATE NONAUTOMATED: CPT

## 2018-06-08 PROCEDURE — 3008F BODY MASS INDEX DOCD: CPT | Mod: CPTII,S$GLB,, | Performed by: INTERNAL MEDICINE

## 2018-06-08 PROCEDURE — 80053 COMPREHEN METABOLIC PANEL: CPT

## 2018-06-08 PROCEDURE — 84443 ASSAY THYROID STIM HORMONE: CPT

## 2018-06-08 PROCEDURE — 3074F SYST BP LT 130 MM HG: CPT | Mod: CPTII,S$GLB,, | Performed by: INTERNAL MEDICINE

## 2018-06-08 PROCEDURE — 99999 PR PBB SHADOW E&M-EST. PATIENT-LVL III: CPT | Mod: PBBFAC,,, | Performed by: INTERNAL MEDICINE

## 2018-06-08 PROCEDURE — 85025 COMPLETE CBC W/AUTO DIFF WBC: CPT

## 2018-06-08 PROCEDURE — 3079F DIAST BP 80-89 MM HG: CPT | Mod: CPTII,S$GLB,, | Performed by: INTERNAL MEDICINE

## 2018-06-11 NOTE — PROGRESS NOTES
History of present illness:  58-year-old female I saw initially in March.  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  She initially had no active synovitis.  Laboratory studies revealed positive rheumatoid factor and CCP antibody.  When I saw her in May she had synovitis in the hands and ankles.  I gave her a Kenalog injection.  I placed her on methotrexate.  I continued her on meloxicam.  She comes back for follow-up.    She is doing better since her last visit.  The cortisone injection helped.  She is tolerating the methotrexate.  Meloxicam seems to be helping.  She has had no fever or infections.  She has had no further joint swelling.    Physical examination:  Musculoskeletal:  She has full range of motion of all joints.  She has no active synovitis.    Assessment:  Improved rheumatoid arthritis    Plans:  1.  Continue medications as before  2.  Repeat laboratory studies in 1 month  3.  Return for follow-up in 2 months

## 2018-06-14 ENCOUNTER — CLINICAL SUPPORT (OUTPATIENT)
Dept: INFECTIOUS DISEASES | Facility: CLINIC | Age: 58
End: 2018-06-14
Payer: COMMERCIAL

## 2018-06-14 PROCEDURE — 90471 IMMUNIZATION ADMIN: CPT | Mod: S$GLB,,, | Performed by: INTERNAL MEDICINE

## 2018-06-14 PROCEDURE — 90636 HEP A/HEP B VACC ADULT IM: CPT | Mod: S$GLB,,, | Performed by: INTERNAL MEDICINE

## 2018-06-14 NOTE — PROGRESS NOTES
Pt received her Hepatitis A/B vaccination. This was the patient's second dose of the vaccination series. Pt tolerated the injection well. Return appt provided. Pt left the unit in NAD.

## 2018-07-02 ENCOUNTER — TELEPHONE (OUTPATIENT)
Dept: INTERNAL MEDICINE | Facility: CLINIC | Age: 58
End: 2018-07-02

## 2018-07-02 NOTE — TELEPHONE ENCOUNTER
----- Message from Nevasukumar Gray sent at 7/2/2018  9:08 AM CDT -----  Contact: pt [phone]  Pt was seen for a physical on 5/9/2018 and the resident changed her tyroid medication to a generic and she doesn't think it is working as well as the one she was on previously and she would like to discuss switching medications with someone. Please advise.

## 2018-07-12 ENCOUNTER — LAB VISIT (OUTPATIENT)
Dept: LAB | Facility: HOSPITAL | Age: 58
End: 2018-07-12
Attending: INTERNAL MEDICINE
Payer: COMMERCIAL

## 2018-07-12 DIAGNOSIS — M05.79 SEROPOSITIVE RHEUMATOID ARTHRITIS OF MULTIPLE SITES: ICD-10-CM

## 2018-07-12 LAB
ALBUMIN SERPL BCP-MCNC: 3.5 G/DL
ALP SERPL-CCNC: 130 U/L
ALT SERPL W/O P-5'-P-CCNC: 13 U/L
ANION GAP SERPL CALC-SCNC: 6 MMOL/L
AST SERPL-CCNC: 9 U/L
BASOPHILS # BLD AUTO: 0.04 K/UL
BASOPHILS NFR BLD: 0.5 %
BILIRUB SERPL-MCNC: 0.3 MG/DL
BUN SERPL-MCNC: 14 MG/DL
CALCIUM SERPL-MCNC: 9.2 MG/DL
CHLORIDE SERPL-SCNC: 107 MMOL/L
CO2 SERPL-SCNC: 28 MMOL/L
CREAT SERPL-MCNC: 0.9 MG/DL
CRP SERPL-MCNC: 8.5 MG/L
DIFFERENTIAL METHOD: ABNORMAL
EOSINOPHIL # BLD AUTO: 0.2 K/UL
EOSINOPHIL NFR BLD: 2.7 %
ERYTHROCYTE [DISTWIDTH] IN BLOOD BY AUTOMATED COUNT: 15.2 %
ERYTHROCYTE [SEDIMENTATION RATE] IN BLOOD BY WESTERGREN METHOD: 33 MM/HR
EST. GFR  (AFRICAN AMERICAN): >60 ML/MIN/1.73 M^2
EST. GFR  (NON AFRICAN AMERICAN): >60 ML/MIN/1.73 M^2
GLUCOSE SERPL-MCNC: 92 MG/DL
HCT VFR BLD AUTO: 38.9 %
HGB BLD-MCNC: 12.2 G/DL
IMM GRANULOCYTES # BLD AUTO: 0.02 K/UL
IMM GRANULOCYTES NFR BLD AUTO: 0.2 %
LYMPHOCYTES # BLD AUTO: 2.4 K/UL
LYMPHOCYTES NFR BLD: 27.8 %
MCH RBC QN AUTO: 30.4 PG
MCHC RBC AUTO-ENTMCNC: 31.4 G/DL
MCV RBC AUTO: 97 FL
MONOCYTES # BLD AUTO: 0.5 K/UL
MONOCYTES NFR BLD: 6 %
NEUTROPHILS # BLD AUTO: 5.5 K/UL
NEUTROPHILS NFR BLD: 62.8 %
NRBC BLD-RTO: 0 /100 WBC
PLATELET # BLD AUTO: 366 K/UL
PMV BLD AUTO: 9.8 FL
POTASSIUM SERPL-SCNC: 4.2 MMOL/L
PROT SERPL-MCNC: 6.7 G/DL
RBC # BLD AUTO: 4.01 M/UL
SODIUM SERPL-SCNC: 141 MMOL/L
WBC # BLD AUTO: 8.67 K/UL

## 2018-07-12 PROCEDURE — 85025 COMPLETE CBC W/AUTO DIFF WBC: CPT

## 2018-07-12 PROCEDURE — 86140 C-REACTIVE PROTEIN: CPT

## 2018-07-12 PROCEDURE — 80053 COMPREHEN METABOLIC PANEL: CPT

## 2018-07-12 PROCEDURE — 36415 COLL VENOUS BLD VENIPUNCTURE: CPT | Mod: PO

## 2018-07-12 PROCEDURE — 85651 RBC SED RATE NONAUTOMATED: CPT

## 2018-07-16 ENCOUNTER — CLINICAL SUPPORT (OUTPATIENT)
Dept: INFECTIOUS DISEASES | Facility: CLINIC | Age: 58
End: 2018-07-16
Payer: COMMERCIAL

## 2018-07-16 PROCEDURE — 90472 IMMUNIZATION ADMIN EACH ADD: CPT | Mod: S$GLB,,, | Performed by: INTERNAL MEDICINE

## 2018-07-16 PROCEDURE — 90732 PPSV23 VACC 2 YRS+ SUBQ/IM: CPT | Mod: S$GLB,,, | Performed by: INTERNAL MEDICINE

## 2018-07-16 PROCEDURE — 90471 IMMUNIZATION ADMIN: CPT | Mod: S$GLB,,, | Performed by: INTERNAL MEDICINE

## 2018-07-16 PROCEDURE — 90750 HZV VACC RECOMBINANT IM: CPT | Mod: S$GLB,,, | Performed by: INTERNAL MEDICINE

## 2018-07-16 NOTE — PROGRESS NOTES
Pt received the Pneumovax and second dose of her Shingrix vaccination. Pt tolerated the injection well. Pt left the unit in NAD. Return appt provided for the final dose of her Hepatitis A/B vaccination. Pt left the unit in NAD.

## 2018-07-18 RX ORDER — METHOTREXATE 2.5 MG/1
TABLET ORAL
Qty: 30 TABLET | Refills: 2 | Status: SHIPPED | OUTPATIENT
Start: 2018-07-18 | End: 2018-10-25 | Stop reason: SDUPTHER

## 2018-08-06 ENCOUNTER — OFFICE VISIT (OUTPATIENT)
Dept: OPTOMETRY | Facility: CLINIC | Age: 58
End: 2018-08-06
Payer: COMMERCIAL

## 2018-08-06 DIAGNOSIS — H52.7 REFRACTIVE ERROR: ICD-10-CM

## 2018-08-06 DIAGNOSIS — H47.20 OPTIC NERVE ATROPHY: ICD-10-CM

## 2018-08-06 DIAGNOSIS — H25.11 NUCLEAR SCLEROSIS OF RIGHT EYE: Primary | ICD-10-CM

## 2018-08-06 PROCEDURE — 99999 PR PBB SHADOW E&M-EST. PATIENT-LVL I: CPT | Mod: PBBFAC,,, | Performed by: OPTOMETRIST

## 2018-08-06 PROCEDURE — 92015 DETERMINE REFRACTIVE STATE: CPT | Mod: S$GLB,,, | Performed by: OPTOMETRIST

## 2018-08-06 PROCEDURE — 92004 COMPRE OPH EXAM NEW PT 1/>: CPT | Mod: S$GLB,,, | Performed by: OPTOMETRIST

## 2018-08-06 RX ORDER — MELOXICAM 15 MG/1
TABLET ORAL
Refills: 6 | COMMUNITY
Start: 2018-07-23 | End: 2018-12-03

## 2018-08-17 ENCOUNTER — OFFICE VISIT (OUTPATIENT)
Dept: RHEUMATOLOGY | Facility: CLINIC | Age: 58
End: 2018-08-17
Payer: COMMERCIAL

## 2018-08-17 ENCOUNTER — LAB VISIT (OUTPATIENT)
Dept: LAB | Facility: HOSPITAL | Age: 58
End: 2018-08-17
Attending: INTERNAL MEDICINE
Payer: COMMERCIAL

## 2018-08-17 VITALS
DIASTOLIC BLOOD PRESSURE: 78 MMHG | HEART RATE: 73 BPM | WEIGHT: 153.5 LBS | BODY MASS INDEX: 28.25 KG/M2 | SYSTOLIC BLOOD PRESSURE: 115 MMHG | HEIGHT: 62 IN

## 2018-08-17 DIAGNOSIS — M05.79 SEROPOSITIVE RHEUMATOID ARTHRITIS OF MULTIPLE SITES: ICD-10-CM

## 2018-08-17 DIAGNOSIS — M05.79 RHEUMATOID ARTHRITIS INVOLVING MULTIPLE SITES WITH POSITIVE RHEUMATOID FACTOR: Primary | ICD-10-CM

## 2018-08-17 DIAGNOSIS — Z79.60 LONG-TERM USE OF IMMUNOSUPPRESSANT MEDICATION: ICD-10-CM

## 2018-08-17 LAB
ALBUMIN SERPL BCP-MCNC: 3.7 G/DL
ALP SERPL-CCNC: 140 U/L
ALT SERPL W/O P-5'-P-CCNC: 16 U/L
ANION GAP SERPL CALC-SCNC: 9 MMOL/L
AST SERPL-CCNC: 9 U/L
BASOPHILS # BLD AUTO: 0.06 K/UL
BASOPHILS NFR BLD: 0.6 %
BILIRUB SERPL-MCNC: 0.4 MG/DL
BUN SERPL-MCNC: 8 MG/DL
CALCIUM SERPL-MCNC: 9.4 MG/DL
CHLORIDE SERPL-SCNC: 106 MMOL/L
CO2 SERPL-SCNC: 28 MMOL/L
CREAT SERPL-MCNC: 0.8 MG/DL
CRP SERPL-MCNC: 7.8 MG/L
DIFFERENTIAL METHOD: ABNORMAL
EOSINOPHIL # BLD AUTO: 0.4 K/UL
EOSINOPHIL NFR BLD: 3.8 %
ERYTHROCYTE [DISTWIDTH] IN BLOOD BY AUTOMATED COUNT: 14.3 %
ERYTHROCYTE [SEDIMENTATION RATE] IN BLOOD BY WESTERGREN METHOD: 24 MM/HR
EST. GFR  (AFRICAN AMERICAN): >60 ML/MIN/1.73 M^2
EST. GFR  (NON AFRICAN AMERICAN): >60 ML/MIN/1.73 M^2
GLUCOSE SERPL-MCNC: 94 MG/DL
HCT VFR BLD AUTO: 41.4 %
HGB BLD-MCNC: 13.1 G/DL
IMM GRANULOCYTES # BLD AUTO: 0.02 K/UL
IMM GRANULOCYTES NFR BLD AUTO: 0.2 %
LYMPHOCYTES # BLD AUTO: 2.6 K/UL
LYMPHOCYTES NFR BLD: 27.1 %
MCH RBC QN AUTO: 30.5 PG
MCHC RBC AUTO-ENTMCNC: 31.6 G/DL
MCV RBC AUTO: 97 FL
MONOCYTES # BLD AUTO: 0.5 K/UL
MONOCYTES NFR BLD: 5.6 %
NEUTROPHILS # BLD AUTO: 6 K/UL
NEUTROPHILS NFR BLD: 62.7 %
NRBC BLD-RTO: 0 /100 WBC
PLATELET # BLD AUTO: 376 K/UL
PMV BLD AUTO: 9.3 FL
POTASSIUM SERPL-SCNC: 4 MMOL/L
PROT SERPL-MCNC: 7.1 G/DL
RBC # BLD AUTO: 4.29 M/UL
SODIUM SERPL-SCNC: 143 MMOL/L
WBC # BLD AUTO: 9.57 K/UL

## 2018-08-17 PROCEDURE — 99999 PR PBB SHADOW E&M-EST. PATIENT-LVL III: CPT | Mod: PBBFAC,,, | Performed by: INTERNAL MEDICINE

## 2018-08-17 PROCEDURE — 3078F DIAST BP <80 MM HG: CPT | Mod: CPTII,S$GLB,, | Performed by: INTERNAL MEDICINE

## 2018-08-17 PROCEDURE — 36415 COLL VENOUS BLD VENIPUNCTURE: CPT

## 2018-08-17 PROCEDURE — 85025 COMPLETE CBC W/AUTO DIFF WBC: CPT

## 2018-08-17 PROCEDURE — 99213 OFFICE O/P EST LOW 20 MIN: CPT | Mod: S$GLB,,, | Performed by: INTERNAL MEDICINE

## 2018-08-17 PROCEDURE — 80053 COMPREHEN METABOLIC PANEL: CPT

## 2018-08-17 PROCEDURE — 86140 C-REACTIVE PROTEIN: CPT

## 2018-08-17 PROCEDURE — 85652 RBC SED RATE AUTOMATED: CPT

## 2018-08-17 PROCEDURE — 3008F BODY MASS INDEX DOCD: CPT | Mod: CPTII,S$GLB,, | Performed by: INTERNAL MEDICINE

## 2018-08-17 PROCEDURE — 3074F SYST BP LT 130 MM HG: CPT | Mod: CPTII,S$GLB,, | Performed by: INTERNAL MEDICINE

## 2018-08-17 NOTE — PROGRESS NOTES
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  She has a positive rheumatoid factor and CCP antibody.  She has no evidence of erosive disease.  She had synovitis in her hands and ankles in May and I started her on methotrexate.  She also had a Kenalog injection.  She continued on her meloxicam.  She continues to do well since then.  She has had no recent joint pain or swelling.  She does have occasional morning stiffness.  She continues to take meloxicam on a daily basis.  She is tolerating the methotrexate.  She has had no other recent medical problems.  She is on no new medications.    Physical examination:  Musculoskeletal:  Full range of motion of all joints.  No synovitis.  No tender areas to palpation.    Assessment:  Rheumatoid arthritis, clinically in remission    Plans:  1.  Continue methotrexate 15 mg weekly  2.  I told her she only needs to take the meloxicam as needed  3.  She will have repeat laboratory studies in 2 months  4.  I will see her in follow-up in 4 months.

## 2018-09-25 DIAGNOSIS — E55.9 VITAMIN D DEFICIENCY DISEASE: ICD-10-CM

## 2018-09-27 RX ORDER — ERGOCALCIFEROL 1.25 MG/1
CAPSULE ORAL
Qty: 12 CAPSULE | Refills: 0 | Status: SHIPPED | OUTPATIENT
Start: 2018-09-27 | End: 2018-12-28 | Stop reason: SDUPTHER

## 2018-10-01 DIAGNOSIS — M05.79 RHEUMATOID ARTHRITIS INVOLVING MULTIPLE SITES WITH POSITIVE RHEUMATOID FACTOR: Primary | ICD-10-CM

## 2018-10-04 ENCOUNTER — TELEPHONE (OUTPATIENT)
Dept: RHEUMATOLOGY | Facility: CLINIC | Age: 58
End: 2018-10-04

## 2018-10-04 NOTE — TELEPHONE ENCOUNTER
----- Message from Rahul Almanza sent at 10/4/2018  1:08 PM CDT -----  Contact: Self @ [phone]  Pt would like a call back @ [phone] in regards to scheduling her f/u appointment.    Pt labs were scheduled for 10/15. Appointment with  was offered to pt, but pt refused.     Pt would like to speak with someone about scheduling her 4 mo f/u with another provider while Dr. Hernandez is out.

## 2018-10-15 ENCOUNTER — LAB VISIT (OUTPATIENT)
Dept: LAB | Facility: HOSPITAL | Age: 58
End: 2018-10-15
Attending: INTERNAL MEDICINE
Payer: COMMERCIAL

## 2018-10-15 DIAGNOSIS — M05.79 RHEUMATOID ARTHRITIS INVOLVING MULTIPLE SITES WITH POSITIVE RHEUMATOID FACTOR: ICD-10-CM

## 2018-10-15 LAB
ALBUMIN SERPL BCP-MCNC: 3.6 G/DL
ALP SERPL-CCNC: 125 U/L
ALT SERPL W/O P-5'-P-CCNC: 11 U/L
ANION GAP SERPL CALC-SCNC: 8 MMOL/L
AST SERPL-CCNC: 8 U/L
BASOPHILS # BLD AUTO: 0.06 K/UL
BASOPHILS NFR BLD: 0.7 %
BILIRUB SERPL-MCNC: 0.4 MG/DL
BUN SERPL-MCNC: 13 MG/DL
CALCIUM SERPL-MCNC: 9.4 MG/DL
CHLORIDE SERPL-SCNC: 108 MMOL/L
CO2 SERPL-SCNC: 27 MMOL/L
CREAT SERPL-MCNC: 0.7 MG/DL
CRP SERPL-MCNC: 7.8 MG/L
DIFFERENTIAL METHOD: ABNORMAL
EOSINOPHIL # BLD AUTO: 0.3 K/UL
EOSINOPHIL NFR BLD: 3.2 %
ERYTHROCYTE [DISTWIDTH] IN BLOOD BY AUTOMATED COUNT: 13.1 %
ERYTHROCYTE [SEDIMENTATION RATE] IN BLOOD BY WESTERGREN METHOD: 35 MM/HR
EST. GFR  (AFRICAN AMERICAN): >60 ML/MIN/1.73 M^2
EST. GFR  (NON AFRICAN AMERICAN): >60 ML/MIN/1.73 M^2
GLUCOSE SERPL-MCNC: 91 MG/DL
HCT VFR BLD AUTO: 42 %
HGB BLD-MCNC: 13 G/DL
IMM GRANULOCYTES # BLD AUTO: 0.02 K/UL
IMM GRANULOCYTES NFR BLD AUTO: 0.2 %
LYMPHOCYTES # BLD AUTO: 2.2 K/UL
LYMPHOCYTES NFR BLD: 24 %
MCH RBC QN AUTO: 30.4 PG
MCHC RBC AUTO-ENTMCNC: 31 G/DL
MCV RBC AUTO: 98 FL
MONOCYTES # BLD AUTO: 0.7 K/UL
MONOCYTES NFR BLD: 7.8 %
NEUTROPHILS # BLD AUTO: 5.8 K/UL
NEUTROPHILS NFR BLD: 64.1 %
NRBC BLD-RTO: 0 /100 WBC
PLATELET # BLD AUTO: 344 K/UL
PMV BLD AUTO: 10.2 FL
POTASSIUM SERPL-SCNC: 3.9 MMOL/L
PROT SERPL-MCNC: 6.7 G/DL
RBC # BLD AUTO: 4.27 M/UL
SODIUM SERPL-SCNC: 143 MMOL/L
WBC # BLD AUTO: 9 K/UL

## 2018-10-15 PROCEDURE — 80053 COMPREHEN METABOLIC PANEL: CPT

## 2018-10-15 PROCEDURE — 85652 RBC SED RATE AUTOMATED: CPT

## 2018-10-15 PROCEDURE — 86140 C-REACTIVE PROTEIN: CPT

## 2018-10-15 PROCEDURE — 85025 COMPLETE CBC W/AUTO DIFF WBC: CPT

## 2018-10-15 PROCEDURE — 36415 COLL VENOUS BLD VENIPUNCTURE: CPT | Mod: PO

## 2018-10-23 ENCOUNTER — OFFICE VISIT (OUTPATIENT)
Dept: FAMILY MEDICINE | Facility: CLINIC | Age: 58
End: 2018-10-23
Payer: COMMERCIAL

## 2018-10-23 VITALS
HEIGHT: 62 IN | HEART RATE: 76 BPM | BODY MASS INDEX: 28.56 KG/M2 | SYSTOLIC BLOOD PRESSURE: 110 MMHG | RESPIRATION RATE: 16 BRPM | WEIGHT: 155.19 LBS | OXYGEN SATURATION: 93 % | DIASTOLIC BLOOD PRESSURE: 78 MMHG

## 2018-10-23 DIAGNOSIS — E78.5 HYPERLIPIDEMIA, UNSPECIFIED HYPERLIPIDEMIA TYPE: ICD-10-CM

## 2018-10-23 DIAGNOSIS — S16.1XXA STRAIN OF NECK MUSCLE, INITIAL ENCOUNTER: Primary | ICD-10-CM

## 2018-10-23 DIAGNOSIS — D84.9 IMMUNOSUPPRESSION: ICD-10-CM

## 2018-10-23 DIAGNOSIS — M05.79 RHEUMATOID ARTHRITIS INVOLVING MULTIPLE SITES WITH POSITIVE RHEUMATOID FACTOR: ICD-10-CM

## 2018-10-23 DIAGNOSIS — Z79.60 LONG-TERM USE OF IMMUNOSUPPRESSANT MEDICATION: ICD-10-CM

## 2018-10-23 PROCEDURE — 3008F BODY MASS INDEX DOCD: CPT | Mod: CPTII,S$GLB,, | Performed by: INTERNAL MEDICINE

## 2018-10-23 PROCEDURE — 3078F DIAST BP <80 MM HG: CPT | Mod: CPTII,S$GLB,, | Performed by: INTERNAL MEDICINE

## 2018-10-23 PROCEDURE — 99999 PR PBB SHADOW E&M-EST. PATIENT-LVL IV: CPT | Mod: PBBFAC,,, | Performed by: INTERNAL MEDICINE

## 2018-10-23 PROCEDURE — 99214 OFFICE O/P EST MOD 30 MIN: CPT | Mod: S$GLB,,, | Performed by: INTERNAL MEDICINE

## 2018-10-23 PROCEDURE — 3074F SYST BP LT 130 MM HG: CPT | Mod: CPTII,S$GLB,, | Performed by: INTERNAL MEDICINE

## 2018-10-23 RX ORDER — TIZANIDINE 2 MG/1
2 TABLET ORAL EVERY 8 HOURS PRN
Qty: 30 TABLET | Refills: 0 | Status: SHIPPED | OUTPATIENT
Start: 2018-10-23 | End: 2018-11-02

## 2018-10-23 NOTE — PROGRESS NOTES
"HISTORY OF PRESENT ILLNESS:  Darlyn Donato is a 58 y.o. female who presents to the clinic today for Annual Exam    Rheumatoid arthritis - Followed by Dr. Hernandez.  Currently on MTX 15 mg weekly, PRN Mobic.  To follow up in December.  Feels that joint pain symptoms are somewhat improving.    Right neck & upper arm pain - Feels worse recently to the point had to take an old hydrocodone prescription pill to manage the pain.  She reports right sided neck pain associated with it - feels like a knot, muscle tension.  Works as a  and on the computer throughout the day.  Has been taking Aleve and Advil to manage the pain.    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    PAST MEDICAL HISTORY:  Past Medical History:   Diagnosis Date    Arthritis     Depression     Elevated alkaline phosphatase level     Hyperlipidemia     Hypertension     Osteopenia     Personal history of female infertility     Respiratory distress     " stopped Breathing" after 2 pain meds were given in a close time frame to one another.    Thyroid disease     multinodular goiter    Vitamin D deficiency disease        PAST SURGICAL HISTORY:  Past Surgical History:   Procedure Laterality Date    BREAST SURGERY      benign biopsy on left    COLONOSCOPY N/A 2/19/2013    [facility]    HYSTERECTOMY, SUPRACERVICAL  10/28/2013    [facility] OR    LAPAROSCOPY W/ MINI-LAPAROTOMY      for fertility issues    [place] REMOVAL OF OVARY/TUBE(S)      RECTAL SURGERY      excess skin growth removal    SALPINGO-OOPHORECTOMY, BILATERAL Bilateral 10/28/2013    [facility]    supracervical hysterectomy      TONSILLECTOMY         SOCIAL HISTORY:  Social History     Socioeconomic History    Marital status:      Spouse name: Not on file    Number of children: 0    Years of education: some colle    Highest " education level: Not on file   Social Needs    Financial resource strain: Not on file    Food insecurity - worry: Not on file    Food insecurity - inability: Not on file    Transportation needs - medical: Not on file    Transportation needs - non-medical: Not on file   Occupational History    Occupation:  law firm     Employer: [employer]   Tobacco Use    Smoking status: Former Smoker     Packs/day: 2.50     Years: 30.00     Pack years: 75.00    Smokeless tobacco: Never Used   Substance and Sexual Activity    Alcohol use: Yes     Comment: Rare    Drug use: No    Sexual activity: Yes     Partners: Male   Other Topics Concern    Are you pregnant or think you may be? Not Asked    Breast-feeding Not Asked   Social History Narrative    Adult Screenings    Mammogram( for females) done 4/2011    Pap ( for females)? Needs referral to  GYN    Colonoscopy age  52-Not done yet    Flu shot yearly to be done today  1/9/13    Td ?    Pneumovax recommended one time  at age  67    Zostavax recommended one time at  age  62    Eye exam recommended yearly- not done yet     Bone density April 2011-osteopenia       FAMILY HISTORY:  Family History   Problem Relation Age of Onset    Breast cancer Mother     Hyperlipidemia Mother     Emphysema Father     No Known Problems Sister     No Known Problems Brother     Macular degeneration Maternal Aunt     No Known Problems Maternal Uncle     No Known Problems Paternal Aunt     No Known Problems Paternal Uncle     No Known Problems Maternal Grandmother     No Known Problems Maternal Grandfather     No Known Problems Paternal Grandmother     No Known Problems Paternal Grandfather     Colon cancer Neg Hx     Ovarian cancer Neg Hx     Melanoma Neg Hx     Amblyopia Neg Hx     Blindness Neg Hx     Cancer Neg Hx     Cataracts Neg Hx     Diabetes Neg Hx     Hypertension Neg Hx     Retinal detachment Neg Hx     Strabismus Neg Hx     Stroke Neg Hx      Thyroid disease Neg Hx     Glaucoma Neg Hx        ALLERGIES AND MEDICATIONS: updated and reviewed.  Review of patient's allergies indicates:   Allergen Reactions    Hydromorphone Shortness Of Breath    Percocet  [oxycodone-acetaminophen] Itching and Nausea Only    Vicodin  [hydrocodone-acetaminophen] Nausea Only and Nausea And Vomiting        Medication List           Accurate as of 10/23/18  8:20 AM. If you have any questions, ask your nurse or doctor.               CONTINUE taking these medications    amLODIPine 5 MG tablet  Commonly known as:  NORVASC  Take 1 tablet (5 mg total) by mouth once daily.     DIHYDROCODEINE-ASPIRIN-CAFF ORAL     ergocalciferol 50,000 unit Cap  Commonly known as:  ERGOCALCIFEROL  TAKE 1 CAPSULE BY MOUTH EVERY 7 DAYS.     folic acid 1 MG tablet  Commonly known as:  FOLVITE  Take 1 tablet (1 mg total) by mouth once daily.     levothyroxine 100 MCG tablet  Commonly known as:  SYNTHROID  Take 1 tablet (100 mcg total) by mouth once daily.     meloxicam 15 MG tablet  Commonly known as:  MOBIC     methotrexate 2.5 MG Tab  TAKE 6 TABLETS(15 MG) BY MOUTH EVERY 7 DAYS     PROAIR HFA 90 mcg/actuation inhaler  Generic drug:  albuterol               CARE TEAM:  Patient Care Team:  Amando Prajapati MD as PCP - General (Internal Medicine)         REVIEW OF SYSTEMS:  Review of Systems   Constitutional: Negative for fatigue and fever.   Respiratory: Negative for cough and shortness of breath.    Cardiovascular: Negative for chest pain and palpitations.   Gastrointestinal: Negative for abdominal pain, nausea and vomiting.   Genitourinary: Negative for dysuria and flank pain.   Musculoskeletal: Positive for arthralgias, joint swelling, myalgias and neck pain.   Skin: Negative for rash and wound.   Neurological: Negative for light-headedness and headaches.     PHYSICAL EXAM:   Vitals:    10/23/18 0848   BP: 110/78   Pulse: 76   Resp: 16             Body mass index is 28.39 kg/m².     General  appearance - alert, well appearing, and in no distress  Mental status - normal mood, behavior, speech, dress, motor activity, and thought processes  Chest - clear to auscultation, no wheezes, rales or rhonchi, symmetric air entry  Heart - normal rate and regular rhythm  Abdomen - soft, nontender, nondistended, no masses or organomegaly  Musculoskeletal - tendon contractures noted in palm; mild tenosynovitis in right third MCP joint  Extremities - no pedal edema noted      ASSESSMENT AND PLAN:  1. Strain of neck muscle, initial encounter  - Advised to stretching exercises, PRN heat to the area, PRN Tylenol or Mobic along with PRN Zanaflex.  Call if no improvement in 3-4 weeks then consider PT evaluation.  - tiZANidine (ZANAFLEX) 2 MG tablet; Take 1 tablet (2 mg total) by mouth every 8 (eight) hours as needed.  Dispense: 30 tablet; Refill: 0    2. Hyperlipidemia, unspecified hyperlipidemia type  - Lifestyle modification including healthy eating and exercise.    3. Immunosuppression  4. Rheumatoid arthritis involving multiple sites with positive rheumatoid factor  5. Long-term use of immunosuppressant medication  - Maintain follow up in rheumatology in December 2018  - Follow up Dr. Mcdonald/ID for any further immunizations       Follow up 3 months or sooner as needed.

## 2018-10-29 RX ORDER — METHOTREXATE 2.5 MG/1
TABLET ORAL
Qty: 30 TABLET | Refills: 0 | Status: SHIPPED | OUTPATIENT
Start: 2018-10-29 | End: 2018-12-01 | Stop reason: SDUPTHER

## 2018-11-16 ENCOUNTER — CLINICAL SUPPORT (OUTPATIENT)
Dept: INFECTIOUS DISEASES | Facility: CLINIC | Age: 58
End: 2018-11-16
Payer: COMMERCIAL

## 2018-11-16 PROCEDURE — 90636 HEP A/HEP B VACC ADULT IM: CPT | Mod: S$GLB,,, | Performed by: INTERNAL MEDICINE

## 2018-11-16 PROCEDURE — 90471 IMMUNIZATION ADMIN: CPT | Mod: S$GLB,,, | Performed by: INTERNAL MEDICINE

## 2018-12-01 ENCOUNTER — TELEPHONE (OUTPATIENT)
Dept: ENDOSCOPY | Facility: HOSPITAL | Age: 58
End: 2018-12-01

## 2018-12-01 DIAGNOSIS — M05.79 RHEUMATOID ARTHRITIS INVOLVING MULTIPLE SITES WITH POSITIVE RHEUMATOID FACTOR: Primary | ICD-10-CM

## 2018-12-03 RX ORDER — METHOTREXATE 2.5 MG/1
TABLET ORAL
Qty: 30 TABLET | Refills: 0 | Status: SHIPPED | OUTPATIENT
Start: 2018-12-03 | End: 2019-03-14

## 2018-12-03 RX ORDER — MELOXICAM 15 MG/1
TABLET ORAL
Qty: 30 TABLET | Refills: 0 | Status: SHIPPED | OUTPATIENT
Start: 2018-12-03 | End: 2019-01-03 | Stop reason: SDUPTHER

## 2018-12-05 NOTE — PROGRESS NOTES
RHEUMATOLOGY CLINIC FOLLOW UP VISIT  Chief complaints:- bilateral LE pain       HPI:-  Darlyn Ibarra a 58 y.o. pleasant female with history of non-erosive migratory arthritis (+ and +).  Patient was started on MTX 15mg Qweekly in May 2018 following findings of synovitis of the hands and ankles.      Today complaining of bilateral ankle and feet pain that started a couple weeks ago, following increased walking (shopping).  Pain was sharp in nature and had resolved significantly already.  Still having 3-5/10 pain everyday.  Pain is controlled with meloxicam daily.     After starting MTX, pain is better controlled.  Prior to the starting of MTX, patient was unable to walk.  Now, she is able to ambulate without assistance.  Pain is more a dull sensation but persistent and constant.   Tolerating medication well without any complications.  Compliant with all home medications.       Associated symptoms include bilateral hand and feet swelling.  AM stiffness of a couple of hours (feet/ankles).  Patient continues to feel generalized fatigue/maliase after ~1 hour of ambulation.     Denies any fever/chills, recent trauma, N/V, mouth ulcer, GI upset, diarrhea/constipation, urinary symptoms, SOB, or CP    Review of Systems   Constitutional: Negative for chills, diaphoresis, fever, malaise/fatigue and weight loss.   HENT: Negative for congestion, ear discharge, ear pain, hearing loss, nosebleeds, sinus pain and tinnitus.    Eyes: Negative for blurred vision, photophobia, pain, discharge and redness.   Respiratory: Negative for cough, hemoptysis, sputum production, shortness of breath, wheezing and stridor.    Cardiovascular: Negative for chest pain, palpitations, orthopnea, claudication, leg swelling and PND.   Gastrointestinal: Negative for abdominal pain, constipation, diarrhea, heartburn, nausea and vomiting.   Genitourinary: Negative for dysuria, frequency,  "hematuria and urgency.   Musculoskeletal: Positive for joint pain. Negative for back pain, myalgias and neck pain.   Skin: Negative for rash.   Neurological: Negative for dizziness, tingling, tremors, weakness and headaches.   Endo/Heme/Allergies: Does not bruise/bleed easily.   Psychiatric/Behavioral: Negative for depression, hallucinations and suicidal ideas. The patient is not nervous/anxious and does not have insomnia.        Past Medical History:   Diagnosis Date    Arthritis     Depression     Elevated alkaline phosphatase level     Hyperlipidemia     Osteopenia     Personal history of female infertility     Respiratory distress     " stopped Breathing" after 2 pain meds were given in a close time frame to one another.    Thyroid disease     multinodular goiter    Vitamin D deficiency disease        Past Surgical History:   Procedure Laterality Date    BREAST SURGERY      benign biopsy on left    COLONOSCOPY N/A 2/19/2013    [facility] ENDO    HYSTERECTOMY, SUPRACERVICAL  10/28/2013    [facility] at [facility] OR    LAPAROSCOPY W/ MINI-LAPAROTOMY      for fertility issues    [place] REMOVAL OF OVARY/TUBE(S)      RECTAL SURGERY      excess skin growth removal    SALPINGO-OOPHORECTOMY, BILATERAL Bilateral 10/28/2013    [facility] OR    supracervical hysterectomy      TONSILLECTOMY          Social History     Tobacco Use    Smoking status: Former Smoker     Packs/day: 2.50     Years: 30.00     Pack years: 75.00    Smokeless tobacco: Never Used   Substance Use Topics    Alcohol use: Yes     Comment: Rare    Drug use: No       Family History   Problem Relation Age of Onset    Breast cancer Mother     Hyperlipidemia Mother     Emphysema Father     No Known Problems Sister     No Known Problems Brother     Macular degeneration Maternal Aunt     No Known Problems Maternal Uncle     No Known Problems Paternal Aunt     " "No Known Problems Paternal Uncle     No Known Problems Maternal Grandmother     No Known Problems Maternal Grandfather     No Known Problems Paternal Grandmother     No Known Problems Paternal Grandfather     Colon cancer Neg Hx     Ovarian cancer Neg Hx     Melanoma Neg Hx     Amblyopia Neg Hx     Blindness Neg Hx     Cancer Neg Hx     Cataracts Neg Hx     Diabetes Neg Hx     Hypertension Neg Hx     Retinal detachment Neg Hx     Strabismus Neg Hx     Stroke Neg Hx     Thyroid disease Neg Hx     Glaucoma Neg Hx        Review of patient's allergies indicates:   Allergen Reactions    Hydromorphone Shortness Of Breath    Percocet  [oxycodone-acetaminophen] Itching and Nausea Only    Vicodin  [hydrocodone-acetaminophen] Nausea Only and Nausea And Vomiting           Physical examination:-    Vitals:    12/06/18 0849   BP: 115/76   Pulse: 88   Weight: 71.7 kg (158 lb)   Height: 5' 2" (1.575 m)   PainSc:   3   PainLoc: Foot       Physical Exam   Constitutional: She is oriented to person, place, and time and well-developed, well-nourished, and in no distress.   HENT:   Head: Normocephalic and atraumatic.   Eyes: Conjunctivae are normal. Pupils are equal, round, and reactive to light.   Neck: Normal range of motion. Neck supple.   Cardiovascular: Normal rate, regular rhythm and normal heart sounds.   Pulmonary/Chest: Effort normal and breath sounds normal.   Abdominal: Soft. Bowel sounds are normal.   Musculoskeletal: Normal range of motion.   Bilateral ankle swelling  Between 2nd and 3rd and 3rd and 4th toes discomfort (planar aspect)  Decrease  strength of the R hand compared with L   No swelling or signs of synovitis of the hand  ROM complete in all joints     Neurological: She is alert and oriented to person, place, and time. Gait normal.   Skin: Skin is warm and dry.   No rashes   Psychiatric: Mood, memory, affect and judgment normal.       Labs:-  Results for BRITTNEY REICH (MRN [id number]) as of " 12/5/2018 10:39   Ref. Range 10/15/2018 08:14   WBC Latest Ref Range: 3.90 - 12.70 K/uL 9.00   RBC Latest Ref Range: 4.00 - 5.40 M/uL 4.27   Hemoglobin Latest Ref Range: 12.0 - 16.0 g/dL 13.0   Hematocrit Latest Ref Range: 37.0 - 48.5 % 42.0   MCV Latest Ref Range: 82 - 98 fL 98   MCH Latest Ref Range: 27.0 - 31.0 pg 30.4   MCHC Latest Ref Range: 32.0 - 36.0 g/dL 31.0 (L)   RDW Latest Ref Range: 11.5 - 14.5 % 13.1   Platelets Latest Ref Range: 150 - 350 K/uL 344   MPV Latest Ref Range: 9.2 - 12.9 fL 10.2   Gran% Latest Ref Range: 38.0 - 73.0 % 64.1   Gran # (ANC) Latest Ref Range: 1.8 - 7.7 K/uL 5.8   Immature Granulocytes Latest Ref Range: 0.0 - 0.5 % 0.2   Immature Grans (Abs) Latest Ref Range: 0.00 - 0.04 K/uL 0.02   Lymph% Latest Ref Range: 18.0 - 48.0 % 24.0   Lymph # Latest Ref Range: 1.0 - 4.8 K/uL 2.2   Mono% Latest Ref Range: 4.0 - 15.0 % 7.8   Mono # Latest Ref Range: 0.3 - 1.0 K/uL 0.7   Eosinophil% Latest Ref Range: 0.0 - 8.0 % 3.2   Eos # Latest Ref Range: 0.0 - 0.5 K/uL 0.3   Basophil% Latest Ref Range: 0.0 - 1.9 % 0.7   Baso # Latest Ref Range: 0.00 - 0.20 K/uL 0.06   nRBC Latest Ref Range: 0 /100 WBC 0   Differential Method Unknown Automated   Sed Rate Latest Ref Range: 0 - 36 mm/Hr 35   Sodium Latest Ref Range: 136 - 145 mmol/L 143   Potassium Latest Ref Range: 3.5 - 5.1 mmol/L 3.9   Chloride Latest Ref Range: 95 - 110 mmol/L 108   CO2 Latest Ref Range: 23 - 29 mmol/L 27   Anion Gap Latest Ref Range: 8 - 16 mmol/L 8   BUN, Bld Latest Ref Range: 6 - 20 mg/dL 13   Creatinine Latest Ref Range: 0.5 - 1.4 mg/dL 0.7   eGFR if non African American Latest Ref Range: >60 mL/min/1.73 m^2 >60.0   eGFR if African American Latest Ref Range: >60 mL/min/1.73 m^2 >60.0   Glucose Latest Ref Range: 70 - 110 mg/dL 91   Calcium Latest Ref Range: 8.7 - 10.5 mg/dL 9.4   Alkaline Phosphatase Latest Ref Range: 55 - 135 U/L 125   Total Protein Latest Ref Range: 6.0 - 8.4 g/dL 6.7   Albumin Latest Ref Range: 3.5 - 5.2 g/dL  "3.6   Total Bilirubin Latest Ref Range: 0.1 - 1.0 mg/dL 0.4   AST Latest Ref Range: 10 - 40 U/L 8 (L)   ALT Latest Ref Range: 10 - 44 U/L 11   CRP Latest Ref Range: 0.0 - 8.2 mg/L 7.8   Results for BRITTNEY REICH (MRN [id number]) as of 12/5/2018 10:39   Ref. Range 5/9/2018 11:20 6/8/2018 08:13 7/12/2018 08:14 8/17/2018 08:20 10/15/2018 08:14   Sed Rate Latest Ref Range: 0 - 36 mm/Hr 53 (H) 29 (H) 33 (H) 24 35     Radiographs:-  Independent visualization of images done.   Xray of hand bilateral - no fracture or erosion  X ray foot - bilateral foot mild DJD       Medication List           Accurate as of 12/6/18 10:03 AM. If you have any questions, ask your nurse or doctor.               START taking these medications    insulin syringe-needle,dispos. 1 mL 28 gauge x 1/2" Syrg  0.6 mLs by Misc.(Non-Drug; Combo Route) route once a week.  Started by:  Araclei Knapp MD     methotrexate (PF) 30 mg/0.6 mL Atin  Commonly known as:  RASUVO (PF)  Inject 0.6 mLs (30 mg total) into the skin every 7 days.  Started by:  Araceli Knapp MD     predniSONE 20 MG tablet  Commonly known as:  DELTASONE  Take 1 tablet (20 mg total) by mouth once daily. for 7 days  Started by:  Araceli Knapp MD        CONTINUE taking these medications    DIHYDROCODEINE-ASPIRIN-CAFF ORAL     ergocalciferol 50,000 unit Cap  Commonly known as:  ERGOCALCIFEROL  TAKE 1 CAPSULE BY MOUTH EVERY 7 DAYS.     folic acid 1 MG tablet  Commonly known as:  FOLVITE  Take 1 tablet (1 mg total) by mouth once daily.     levothyroxine 100 MCG tablet  Commonly known as:  SYNTHROID  Take 1 tablet (100 mcg total) by mouth once daily.     meloxicam 15 MG tablet  Commonly known as:  MOBIC  TAKE 1 TABLET(15 MG) BY MOUTH EVERY DAY     methotrexate 2.5 MG Tab  TAKE 6 TABLETS(15 MG) BY MOUTH EVERY 7 DAYS     PROAIR HFA 90 mcg/actuation inhaler  Generic drug:  albuterol           Where to Get Your Medications      These medications were sent to [facility]  " "[address] [facility] [address]    Hours:  Mon-Fri, 7a-7p Phone:  [phone]   · methotrexate (PF) 30 mg/0.6 mL Atin     These medications were sent to [id number] - [place], [place] - [address] [facility] [address] [facility] [address] [facility]  [address] [place], [place] [address] [address]    Hours:  24-hours Phone:  [phone]   · insulin syringe-needle,dispos. 1 mL 28 gauge x 1/2" Syrg  · predniSONE 20 MG tablet         Assessment/Plans:-   58 y.o. pleasant female with history of non-erosive migratory arthritis (+ and +).  Patient was started on MTX 15mg Qweekly in May 2018 following findings of synovitis of the hands and ankles.    Currently complaining of bilateral ankle and feet pain that started after intense walking around.  Pain had since subsided a little.  Patient continues to feel diffused aches and AM stiffness.  Plan to increase MTX to see if there's any improvement in symptoms.  Considering patient has multiple joints pain and swelling, plans to switch over to MTX injection for fast and better efficacy.  Patient is okay with this..     DAS28 CRP - 2.95 (CRP elevated at 11.7)  Low activity.      Plan  - Change to injection MTX 0.6mg   - Insulin syringe ordered  - Prednisone 20mg x 7 weeks   - Continue folate acid daily  - Continue Mobic PRN as needed for pain  - Repeat CBC, CMP, ESR/CRP prior to the next visit            # Follow-up in about 2 months (around 2/6/2019).        "

## 2018-12-06 ENCOUNTER — LAB VISIT (OUTPATIENT)
Dept: LAB | Facility: HOSPITAL | Age: 58
End: 2018-12-06
Attending: INTERNAL MEDICINE
Payer: COMMERCIAL

## 2018-12-06 ENCOUNTER — TELEPHONE (OUTPATIENT)
Dept: PHARMACY | Facility: CLINIC | Age: 58
End: 2018-12-06

## 2018-12-06 ENCOUNTER — OFFICE VISIT (OUTPATIENT)
Dept: RHEUMATOLOGY | Facility: CLINIC | Age: 58
End: 2018-12-06
Payer: COMMERCIAL

## 2018-12-06 VITALS
HEART RATE: 88 BPM | SYSTOLIC BLOOD PRESSURE: 115 MMHG | HEIGHT: 62 IN | BODY MASS INDEX: 29.08 KG/M2 | WEIGHT: 158 LBS | DIASTOLIC BLOOD PRESSURE: 76 MMHG

## 2018-12-06 DIAGNOSIS — Z79.60 LONG-TERM USE OF IMMUNOSUPPRESSANT MEDICATION: ICD-10-CM

## 2018-12-06 DIAGNOSIS — M05.79 RHEUMATOID ARTHRITIS INVOLVING MULTIPLE SITES WITH POSITIVE RHEUMATOID FACTOR: Primary | ICD-10-CM

## 2018-12-06 DIAGNOSIS — M05.79 SEROPOSITIVE RHEUMATOID ARTHRITIS OF MULTIPLE SITES: ICD-10-CM

## 2018-12-06 LAB
ALBUMIN SERPL BCP-MCNC: 3.7 G/DL
ALP SERPL-CCNC: 134 U/L
ALT SERPL W/O P-5'-P-CCNC: 16 U/L
ANION GAP SERPL CALC-SCNC: 8 MMOL/L
AST SERPL-CCNC: 10 U/L
BASOPHILS # BLD AUTO: 0.07 K/UL
BASOPHILS NFR BLD: 0.8 %
BILIRUB SERPL-MCNC: 0.6 MG/DL
BUN SERPL-MCNC: 10 MG/DL
CALCIUM SERPL-MCNC: 9.7 MG/DL
CHLORIDE SERPL-SCNC: 106 MMOL/L
CO2 SERPL-SCNC: 29 MMOL/L
CREAT SERPL-MCNC: 0.8 MG/DL
CRP SERPL-MCNC: 11.7 MG/L
DIFFERENTIAL METHOD: ABNORMAL
EOSINOPHIL # BLD AUTO: 0.2 K/UL
EOSINOPHIL NFR BLD: 2.6 %
ERYTHROCYTE [DISTWIDTH] IN BLOOD BY AUTOMATED COUNT: 12.9 %
ERYTHROCYTE [SEDIMENTATION RATE] IN BLOOD BY WESTERGREN METHOD: 16 MM/HR
EST. GFR  (AFRICAN AMERICAN): >60 ML/MIN/1.73 M^2
EST. GFR  (NON AFRICAN AMERICAN): >60 ML/MIN/1.73 M^2
GLUCOSE SERPL-MCNC: 98 MG/DL
HCT VFR BLD AUTO: 42.4 %
HGB BLD-MCNC: 13.4 G/DL
IMM GRANULOCYTES # BLD AUTO: 0.03 K/UL
IMM GRANULOCYTES NFR BLD AUTO: 0.3 %
LYMPHOCYTES # BLD AUTO: 2.3 K/UL
LYMPHOCYTES NFR BLD: 24.4 %
MCH RBC QN AUTO: 30.1 PG
MCHC RBC AUTO-ENTMCNC: 31.6 G/DL
MCV RBC AUTO: 95 FL
MONOCYTES # BLD AUTO: 0.6 K/UL
MONOCYTES NFR BLD: 6.2 %
NEUTROPHILS # BLD AUTO: 6.1 K/UL
NEUTROPHILS NFR BLD: 65.7 %
NRBC BLD-RTO: 0 /100 WBC
PLATELET # BLD AUTO: 355 K/UL
PMV BLD AUTO: 10.2 FL
POTASSIUM SERPL-SCNC: 4.1 MMOL/L
PROT SERPL-MCNC: 7.1 G/DL
RBC # BLD AUTO: 4.45 M/UL
SODIUM SERPL-SCNC: 143 MMOL/L
WBC # BLD AUTO: 9.25 K/UL

## 2018-12-06 PROCEDURE — 85652 RBC SED RATE AUTOMATED: CPT

## 2018-12-06 PROCEDURE — 85025 COMPLETE CBC W/AUTO DIFF WBC: CPT

## 2018-12-06 PROCEDURE — 86140 C-REACTIVE PROTEIN: CPT

## 2018-12-06 PROCEDURE — 99999 PR PBB SHADOW E&M-EST. PATIENT-LVL III: CPT | Mod: PBBFAC,,, | Performed by: STUDENT IN AN ORGANIZED HEALTH CARE EDUCATION/TRAINING PROGRAM

## 2018-12-06 PROCEDURE — 3008F BODY MASS INDEX DOCD: CPT | Mod: CPTII,S$GLB,, | Performed by: STUDENT IN AN ORGANIZED HEALTH CARE EDUCATION/TRAINING PROGRAM

## 2018-12-06 PROCEDURE — 80053 COMPREHEN METABOLIC PANEL: CPT

## 2018-12-06 PROCEDURE — 3074F SYST BP LT 130 MM HG: CPT | Mod: CPTII,S$GLB,, | Performed by: STUDENT IN AN ORGANIZED HEALTH CARE EDUCATION/TRAINING PROGRAM

## 2018-12-06 PROCEDURE — 3078F DIAST BP <80 MM HG: CPT | Mod: CPTII,S$GLB,, | Performed by: STUDENT IN AN ORGANIZED HEALTH CARE EDUCATION/TRAINING PROGRAM

## 2018-12-06 PROCEDURE — 99214 OFFICE O/P EST MOD 30 MIN: CPT | Mod: S$GLB,,, | Performed by: STUDENT IN AN ORGANIZED HEALTH CARE EDUCATION/TRAINING PROGRAM

## 2018-12-06 RX ORDER — PREDNISONE 20 MG/1
20 TABLET ORAL DAILY
Qty: 7 TABLET | Refills: 0 | Status: SHIPPED | OUTPATIENT
Start: 2018-12-06 | End: 2018-12-13

## 2018-12-06 NOTE — PROGRESS NOTES
I have reviewed the notes, assessments,documentation by     58 year old seropos nonerosive RA     Mtx 15 mg every week since may  Folic acid  Does well on meds     B/L ankle and feet pain s/p shopping  Ongoing swelling reported in the hands   Sharp pain  Persistent  3 to 5/10  On mobic daily     Fatigue +  Malaise+     Tolerating meds     High CRP  nml ESR  nml CMP  CBC process  TB pending     Exam   Ankle synovitis  Right hand decreased  strength     Plan      Prednisone 20 mg for a week  Change to injection mtx  0.6 ml weekly for better efficacy     Labs in 2 months  rtc

## 2018-12-06 NOTE — PROGRESS NOTES
58 year old seropos nonerosive RA    Mtx 15 mg every week since may  Folic acid  Does well on meds    B/L ankle and feet pain s/p shopping  Sharp pain  Persistent  3 to 5/10  On mobic daily    Fatigue +  Malaise+    Tolerating meds    High CRP  nml ESR  nml CMP  CBC process  TB pending    Exam   Ankle synovitis  Right hand decreased  strength    Plan     Prednisone 20 mg for a week  Change to injection mtx    Labs in 2 months  rtc

## 2018-12-18 ENCOUNTER — TELEPHONE (OUTPATIENT)
Dept: PHARMACY | Facility: CLINIC | Age: 58
End: 2018-12-18

## 2018-12-18 NOTE — TELEPHONE ENCOUNTER
Methotrexate 50mg/2ml Vials for Injection  consult completed on 18. Methotrexate 50mg/2ml Vials for Injection will be shipped on 18 to arrive at patient's home on 18  via [employer]. $ 0 copay. Patient plans to start Methotrexate 50mg/2ml on 18. Address confirmed. Confirmed 2 patient identifiers - name and . Therapy Appropriate.     Counseled patient on administration directions:  -  Inject Methotrexate 15mg (0.6 ml ) into the skin every 7 days.   -  Wash hands before and after injection.  - Monthly RX will come with gauze, bandaids, and alcohol swabs.  - Patient may inject in either the tops of the thighs, abdomen- but at least 2 inches away from her belly button.  Patient was instructed to rotate injections sites.  - Patient is to wipe down the injection site with the alcohol pad, wait to dry.  Gently squeeze the area of the cleaned skin and hold it firmly.   Insert the syringe under the skin to and push down on the plunger administer the dose.  - Patient will use sharps container; once full, per BASSAM mendoza, she may lock the sharps container and place in her trash. She can then contact the pharmacy and we will replace the sharps at no additional charge.     Patient was counseled on possible side effects:  - Injection site reaction: redness, soreness, itching, bruising, which should resolve within 3-5 days, upset stomach, hair loss, belly pain, dizziness and feeling tired or weak.     DDIs:  Medication list reviewed.     Patient verbalized understanding. Compliance stressed. Patient advised to keep a calendar marking dates of injections to ensure better compliance. Patient advised to call myself or provider should any questions arise. Patient plans to start Methotrexate 50mg/2ml on 18. Consultation included: administration; storage and handling; the importance of compliance; the importance of laboratory monitoring; the importance of keeping all follow up appointments.  Patient understands to  report any medication changes to [facility] and provider. All questions answered and addressed to patients satisfaction. I will f/u with her in 1 week from start, [facility] to contact patient in 3 weeks for refills.

## 2018-12-24 DIAGNOSIS — E55.9 VITAMIN D DEFICIENCY DISEASE: ICD-10-CM

## 2018-12-28 RX ORDER — ERGOCALCIFEROL 1.25 MG/1
CAPSULE ORAL
Qty: 12 CAPSULE | Refills: 0 | Status: SHIPPED | OUTPATIENT
Start: 2018-12-28 | End: 2019-05-10 | Stop reason: SDUPTHER

## 2019-01-03 ENCOUNTER — TELEPHONE (OUTPATIENT)
Dept: PHARMACY | Facility: CLINIC | Age: 59
End: 2019-01-03

## 2019-01-04 RX ORDER — MELOXICAM 15 MG/1
TABLET ORAL
Qty: 30 TABLET | Refills: 0 | Status: SHIPPED | OUTPATIENT
Start: 2019-01-04 | End: 2019-02-05 | Stop reason: SDUPTHER

## 2019-01-22 ENCOUNTER — TELEPHONE (OUTPATIENT)
Dept: PHARMACY | Facility: CLINIC | Age: 59
End: 2019-01-22

## 2019-01-28 ENCOUNTER — LAB VISIT (OUTPATIENT)
Dept: LAB | Facility: HOSPITAL | Age: 59
End: 2019-01-28
Attending: INTERNAL MEDICINE
Payer: COMMERCIAL

## 2019-01-28 ENCOUNTER — OFFICE VISIT (OUTPATIENT)
Dept: FAMILY MEDICINE | Facility: CLINIC | Age: 59
End: 2019-01-28
Payer: COMMERCIAL

## 2019-01-28 VITALS
BODY MASS INDEX: 28.16 KG/M2 | HEART RATE: 74 BPM | OXYGEN SATURATION: 96 % | HEIGHT: 62 IN | TEMPERATURE: 98 F | DIASTOLIC BLOOD PRESSURE: 76 MMHG | WEIGHT: 153 LBS | SYSTOLIC BLOOD PRESSURE: 124 MMHG

## 2019-01-28 DIAGNOSIS — Z00.00 HEALTHCARE MAINTENANCE: ICD-10-CM

## 2019-01-28 DIAGNOSIS — D84.9 IMMUNOSUPPRESSION: ICD-10-CM

## 2019-01-28 DIAGNOSIS — E55.9 VITAMIN D DEFICIENCY DISEASE: ICD-10-CM

## 2019-01-28 DIAGNOSIS — M05.79 RHEUMATOID ARTHRITIS INVOLVING MULTIPLE SITES WITH POSITIVE RHEUMATOID FACTOR: ICD-10-CM

## 2019-01-28 DIAGNOSIS — E03.9 ACQUIRED HYPOTHYROIDISM: ICD-10-CM

## 2019-01-28 DIAGNOSIS — Z00.00 HEALTHCARE MAINTENANCE: Primary | ICD-10-CM

## 2019-01-28 LAB
25(OH)D3+25(OH)D2 SERPL-MCNC: 27 NG/ML
CHOLEST SERPL-MCNC: 194 MG/DL
CHOLEST/HDLC SERPL: 5.9 {RATIO}
ESTIMATED AVG GLUCOSE: 114 MG/DL
HBA1C MFR BLD HPLC: 5.6 %
HDLC SERPL-MCNC: 33 MG/DL
HDLC SERPL: 17 %
LDLC SERPL CALC-MCNC: 128.8 MG/DL
NONHDLC SERPL-MCNC: 161 MG/DL
T4 FREE SERPL-MCNC: 1.56 NG/DL
TRIGL SERPL-MCNC: 161 MG/DL
TSH SERPL DL<=0.005 MIU/L-ACNC: 1.1 UIU/ML

## 2019-01-28 PROCEDURE — 3074F PR MOST RECENT SYSTOLIC BLOOD PRESSURE < 130 MM HG: ICD-10-PCS | Mod: CPTII,S$GLB,, | Performed by: INTERNAL MEDICINE

## 2019-01-28 PROCEDURE — 80061 LIPID PANEL: CPT

## 2019-01-28 PROCEDURE — 3078F PR MOST RECENT DIASTOLIC BLOOD PRESSURE < 80 MM HG: ICD-10-PCS | Mod: CPTII,S$GLB,, | Performed by: INTERNAL MEDICINE

## 2019-01-28 PROCEDURE — 84443 ASSAY THYROID STIM HORMONE: CPT

## 2019-01-28 PROCEDURE — 3074F SYST BP LT 130 MM HG: CPT | Mod: CPTII,S$GLB,, | Performed by: INTERNAL MEDICINE

## 2019-01-28 PROCEDURE — 36415 COLL VENOUS BLD VENIPUNCTURE: CPT | Mod: PN

## 2019-01-28 PROCEDURE — 83036 HEMOGLOBIN GLYCOSYLATED A1C: CPT

## 2019-01-28 PROCEDURE — 99214 PR OFFICE/OUTPT VISIT, EST, LEVL IV, 30-39 MIN: ICD-10-PCS | Mod: S$GLB,,, | Performed by: INTERNAL MEDICINE

## 2019-01-28 PROCEDURE — 3008F BODY MASS INDEX DOCD: CPT | Mod: CPTII,S$GLB,, | Performed by: INTERNAL MEDICINE

## 2019-01-28 PROCEDURE — 84439 ASSAY OF FREE THYROXINE: CPT

## 2019-01-28 PROCEDURE — 3078F DIAST BP <80 MM HG: CPT | Mod: CPTII,S$GLB,, | Performed by: INTERNAL MEDICINE

## 2019-01-28 PROCEDURE — 82306 VITAMIN D 25 HYDROXY: CPT

## 2019-01-28 PROCEDURE — 99999 PR PBB SHADOW E&M-EST. PATIENT-LVL III: ICD-10-PCS | Mod: PBBFAC,,, | Performed by: INTERNAL MEDICINE

## 2019-01-28 PROCEDURE — 99214 OFFICE O/P EST MOD 30 MIN: CPT | Mod: S$GLB,,, | Performed by: INTERNAL MEDICINE

## 2019-01-28 PROCEDURE — 3008F PR BODY MASS INDEX (BMI) DOCUMENTED: ICD-10-PCS | Mod: CPTII,S$GLB,, | Performed by: INTERNAL MEDICINE

## 2019-01-28 PROCEDURE — 99999 PR PBB SHADOW E&M-EST. PATIENT-LVL III: CPT | Mod: PBBFAC,,, | Performed by: INTERNAL MEDICINE

## 2019-01-28 RX ORDER — IBUPROFEN 200 MG
TABLET ORAL
Refills: 0 | COMMUNITY
Start: 2018-12-06 | End: 2020-01-08 | Stop reason: SDUPTHER

## 2019-01-28 NOTE — PROGRESS NOTES
"HISTORY OF PRESENT ILLNESS:  Darlyn Donato is a 58 y.o. female who presents to the clinic today for Follow-up    She is now on MTX injection instead of the pills.  Planning to follow up in March with rheum.  No change in RA symptoms with being on injection.  Still with pain to knuckles, knees, feet.  Still with swelling to hands.  She takes daily Mobic.    She reports fatigue symptoms.    Due for colonoscopy and requesting to have it at main campus.      PAST MEDICAL HISTORY:  Past Medical History:   Diagnosis Date    Arthritis     Depression     Elevated alkaline phosphatase level     Hyperlipidemia     Osteopenia     Personal history of female infertility     Respiratory distress     " stopped Breathing" after 2 pain meds were given in a close time frame to one another.    Thyroid disease     multinodular goiter    Vitamin D deficiency disease        PAST SURGICAL HISTORY:  Past Surgical History:   Procedure Laterality Date    BREAST SURGERY      benign biopsy on left    COLONOSCOPY N/A 2/19/2013    [facility]formed [facility] at [facility] ENDO    HYSTERECTOMY, SUPRACERVICAL  10/28/2013    [facility]formed by [facility] at [facility] OR    LAPAROSCOPY W/ MINI-LAPAROTOMY      for fertility issues    SD REMOVAL OF OVARY/TUBE(S)      RECTAL SURGERY      excess skin growth removal    SALPINGO-OOPHORECTOMY, BILATERAL Bilateral 10/28/2013    [facility] by [facility] OR    supracervical hysterectomy      TONSILLECTOMY         SOCIAL HISTORY:  Social History     Socioeconomic History    Marital status:      Spouse name: Not on file    Number of children: 0    Years of education: some colle    Highest education level: Not on file   Social Needs    Financial resource strain: Not on file    Food insecurity - worry: Not on file    Food insecurity - inability: Not on file    Transportation needs - medical: Not on file    Transportation needs - non-medical: Not on file "   Occupational History    Occupation:  law firm     Employer: [employer]   Tobacco Use    Smoking status: Former Smoker     Packs/day: 2.50     Years: 30.00     Pack years: 75.00    Smokeless tobacco: Never Used   Substance and Sexual Activity    Alcohol use: Yes     Comment: Rare    Drug use: No    Sexual activity: Yes     Partners: Male   Other Topics Concern    Are you pregnant or think you may be? Not Asked    Breast-feeding Not Asked   Social History Narrative    Adult Screenings    Mammogram( for females) done 4/2011    Pap ( for females)? Needs referral to  GYN    Colonoscopy age  52-Not done yet    Flu shot yearly to be done today  1/9/13    Td ?    Pneumovax recommended one time  at age  65    Zostavax recommended one time at  age  60    Eye exam recommended yearly- not done yet     Bone density April 2011-osteopenia       FAMILY HISTORY:  Family History   Problem Relation Age of Onset    Breast cancer Mother     Hyperlipidemia Mother     Emphysema Father     No Known Problems Sister     No Known Problems Brother     Macular degeneration Maternal Aunt     No Known Problems Maternal Uncle     No Known Problems Paternal Aunt     No Known Problems Paternal Uncle     No Known Problems Maternal Grandmother     No Known Problems Maternal Grandfather     No Known Problems Paternal Grandmother     No Known Problems Paternal Grandfather     Colon cancer Neg Hx     Ovarian cancer Neg Hx     Melanoma Neg Hx     Amblyopia Neg Hx     Blindness Neg Hx     Cancer Neg Hx     Cataracts Neg Hx     Diabetes Neg Hx     Hypertension Neg Hx     Retinal detachment Neg Hx     Strabismus Neg Hx     Stroke Neg Hx     Thyroid disease Neg Hx     Glaucoma Neg Hx        ALLERGIES AND MEDICATIONS: updated and reviewed.  Review of patient's allergies indicates:   Allergen Reactions    Hydromorphone Shortness Of Breath    Percocet  [oxycodone-acetaminophen] Itching and Nausea Only     Vicodin  [hydrocodone-acetaminophen] Nausea Only and Nausea And Vomiting        Medication List           Accurate as of 1/28/19  8:13 AM. If you have any questions, ask your nurse or doctor.               CONTINUE taking these medications    DIHYDROCODEINE-ASPIRIN-CAFF ORAL     ergocalciferol 50,000 unit Cap  Commonly known as:  ERGOCALCIFEROL  TAKE 1 CAPSULE BY MOUTH EVERY 7 DAYS.     folic acid 1 MG tablet  Commonly known as:  FOLVITE  Take 1 tablet (1 mg total) by mouth once daily.     levothyroxine 100 MCG tablet  Commonly known as:  SYNTHROID  Take 1 tablet (100 mcg total) by mouth once daily.     meloxicam 15 MG tablet  Commonly known as:  MOBIC  TAKE 1 TABLET(15 MG) BY MOUTH EVERY DAY     * methotrexate 2.5 MG Tab  TAKE 6 TABLETS(15 MG) BY MOUTH EVERY 7 DAYS     * methotrexate 25 mg/mL injection  Inject 0.6 mLs (15 mg total) into the skin every 7 days     PROAIR HFA 90 mcg/actuation inhaler  Generic drug:  albuterol         * This list has 2 medication(s) that are the same as other medications prescribed for you. Read the directions carefully, and ask your doctor or other care provider to review them with you.                   CARE TEAM:  Patient Care Team:  Amando Prajapati MD as PCP - General (Internal Medicine)         REVIEW OF SYSTEMS:  Review of Systems   Constitutional: Positive for fatigue. Negative for chills and unexpected weight change.   HENT: Negative for congestion, rhinorrhea and sinus pain.    Eyes: Negative for photophobia and visual disturbance.   Respiratory: Negative for cough, shortness of breath and wheezing.    Cardiovascular: Negative for chest pain and palpitations.   Gastrointestinal: Negative for abdominal pain, nausea and vomiting.   Genitourinary: Negative for dysuria and pelvic pain.   Neurological: Negative for syncope, light-headedness and headaches.         PHYSICAL EXAM:   Vitals:    01/28/19 0827   BP: 124/76   Pulse: 74   Temp: 98.4 °F (36.9 °C)             Body mass index  is 27.98 kg/m².     General appearance - alert, well appearing, and in no distress and normal appearing weight  Mental status - normal mood, behavior, speech, dress, motor activity, and thought processes  Ears - bilateral TM's and external ear canals normal  Mouth - mucous membranes moist, pharynx normal without lesions  Neck - supple, no significant adenopathy  Chest - clear to auscultation, no wheezes, rales or rhonchi, symmetric air entry  Heart - normal rate and regular rhythm, no murmurs noted  Abdomen - soft, nontender, nondistended, no masses or organomegaly  Neurological - alert, oriented, normal speech, no focal findings or movement disorder noted, motor and sensory grossly normal bilaterally      ASSESSMENT AND PLAN:  1. Healthcare maintenance  - Mammo Digital Screening Bilat; Future  - Ambulatory referral to Obstetrics / Gynecology  - Lipid panel; Future  - Hemoglobin A1c; Future  - Case request GI: COLONOSCOPY    2. Rheumatoid arthritis involving multiple sites with positive rheumatoid factor  - Continue current meds, stable; to follow up with rheumatology next month    3. Immunosuppression  - Stable    4. Acquired hypothyroidism  - TSH; Future  - T4, free; Future    5. Vitamin D deficiency disease  - Vitamin D; Future       Follow up 6 months or sooner as needed.

## 2019-01-31 ENCOUNTER — TELEPHONE (OUTPATIENT)
Dept: ADMINISTRATIVE | Facility: HOSPITAL | Age: 59
End: 2019-01-31

## 2019-01-31 ENCOUNTER — PATIENT MESSAGE (OUTPATIENT)
Dept: FAMILY MEDICINE | Facility: CLINIC | Age: 59
End: 2019-01-31

## 2019-02-05 RX ORDER — MELOXICAM 15 MG/1
TABLET ORAL
Qty: 30 TABLET | Refills: 0 | Status: SHIPPED | OUTPATIENT
Start: 2019-02-05 | End: 2019-03-14 | Stop reason: SDUPTHER

## 2019-02-18 ENCOUNTER — OFFICE VISIT (OUTPATIENT)
Dept: OBSTETRICS AND GYNECOLOGY | Facility: CLINIC | Age: 59
End: 2019-02-18
Payer: COMMERCIAL

## 2019-02-18 ENCOUNTER — HOSPITAL ENCOUNTER (OUTPATIENT)
Dept: RADIOLOGY | Facility: HOSPITAL | Age: 59
Discharge: HOME | End: 2019-02-18
Attending: INTERNAL MEDICINE
Payer: COMMERCIAL

## 2019-02-18 ENCOUNTER — TELEPHONE (OUTPATIENT)
Dept: PHARMACY | Facility: CLINIC | Age: 59
End: 2019-02-18

## 2019-02-18 VITALS
BODY MASS INDEX: 28.18 KG/M2 | DIASTOLIC BLOOD PRESSURE: 80 MMHG | HEIGHT: 62 IN | SYSTOLIC BLOOD PRESSURE: 116 MMHG | WEIGHT: 153.13 LBS

## 2019-02-18 DIAGNOSIS — R39.15 URINARY URGENCY: ICD-10-CM

## 2019-02-18 DIAGNOSIS — Z12.31 ENCOUNTER FOR SCREENING MAMMOGRAM FOR BREAST CANCER: ICD-10-CM

## 2019-02-18 DIAGNOSIS — N95.2 VAGINAL ATROPHY: ICD-10-CM

## 2019-02-18 DIAGNOSIS — Z01.419 WELL WOMAN EXAM WITH ROUTINE GYNECOLOGICAL EXAM: Primary | ICD-10-CM

## 2019-02-18 PROCEDURE — 99386 PREV VISIT NEW AGE 40-64: CPT | Mod: S$GLB,,, | Performed by: OBSTETRICS & GYNECOLOGY

## 2019-02-18 PROCEDURE — 3079F DIAST BP 80-89 MM HG: CPT | Mod: CPTII,S$GLB,, | Performed by: OBSTETRICS & GYNECOLOGY

## 2019-02-18 PROCEDURE — 77063 MAMMO DIGITAL SCREENING BILAT WITH TOMOSYNTHESIS_CAD: ICD-10-PCS | Mod: 26,,, | Performed by: RADIOLOGY

## 2019-02-18 PROCEDURE — 77067 MAMMO DIGITAL SCREENING BILAT WITH TOMOSYNTHESIS_CAD: ICD-10-PCS | Mod: 26,,, | Performed by: RADIOLOGY

## 2019-02-18 PROCEDURE — 77067 SCR MAMMO BI INCL CAD: CPT | Mod: TC

## 2019-02-18 PROCEDURE — 99999 PR PBB SHADOW E&M-EST. PATIENT-LVL III: ICD-10-PCS | Mod: PBBFAC,,, | Performed by: OBSTETRICS & GYNECOLOGY

## 2019-02-18 PROCEDURE — 99999 PR PBB SHADOW E&M-EST. PATIENT-LVL III: CPT | Mod: PBBFAC,,, | Performed by: OBSTETRICS & GYNECOLOGY

## 2019-02-18 PROCEDURE — 99386 PR PREVENTIVE VISIT,NEW,40-64: ICD-10-PCS | Mod: S$GLB,,, | Performed by: OBSTETRICS & GYNECOLOGY

## 2019-02-18 PROCEDURE — 77063 BREAST TOMOSYNTHESIS BI: CPT | Mod: 26,,, | Performed by: RADIOLOGY

## 2019-02-18 PROCEDURE — 77067 SCR MAMMO BI INCL CAD: CPT | Mod: 26,,, | Performed by: RADIOLOGY

## 2019-02-18 PROCEDURE — 3074F SYST BP LT 130 MM HG: CPT | Mod: CPTII,S$GLB,, | Performed by: OBSTETRICS & GYNECOLOGY

## 2019-02-18 PROCEDURE — 3074F PR MOST RECENT SYSTOLIC BLOOD PRESSURE < 130 MM HG: ICD-10-PCS | Mod: CPTII,S$GLB,, | Performed by: OBSTETRICS & GYNECOLOGY

## 2019-02-18 PROCEDURE — 3079F PR MOST RECENT DIASTOLIC BLOOD PRESSURE 80-89 MM HG: ICD-10-PCS | Mod: CPTII,S$GLB,, | Performed by: OBSTETRICS & GYNECOLOGY

## 2019-02-18 NOTE — TELEPHONE ENCOUNTER
Refill call for MTX. Patient confirmed they are in need of a refill. Will prepare for  ship on 2/20 to arrive 2/21 with patient consent.

## 2019-02-18 NOTE — PATIENT INSTRUCTIONS
Prevention Guidelines, Women Ages 50 to 64  Screening tests and vaccines are an important part of managing your health. Health counseling is essential, too. Below are guidelines for these, for women ages 50 to 64. Talk with your healthcare provider to make sure youre up to date on what you need.  Screening Who needs it How often   Type 2 diabetes or prediabetes All adults beginning at age 45 and adults without symptoms at any age who are overweight or obese and have 1 or more additional risk factors for diabetes. At  least every 3 years   Alcohol misuse All women in this age group At routine exams   Blood pressure All women in this age group Every 2 years if your blood pressure is less than 120/80 mm Hg; yearly if your systolic blood pressure is 120 to 139 mm Hg, or your diastolic blood pressure reading is 80 to 89 mm Hg   Breast cancer All women in this age group Yearly mammogram and clinical breast exam1   Cervical cancer All women in this age group, except women who have had a complete hysterectomy Pap test every 3 years or Pap test with human papillomavirus (HPV) test every 5 years   Chlamydia Women at increased risk for infection At routine exams   Colorectal cancer All women in this age group Flexible sigmoidoscopy every 5 years, or colonoscopy every 10 years, or double-contrast barium enema every 5 years; yearly fecal occult blood test or fecal immunochemical test; or a stool DNA test as often as your health care provider advises; talk with your health care provider about which tests are best for you   Depression All women in this age group At routine exams   Gonorrhea Sexually active women at increased risk for infection At routine exams   Hepatitis C Anyone at increased risk; 1 time for those born between 1945 and 1965 At routine exams   High cholesterol or triglycerides All women in this age group who are at risk for coronary artery disease At least every 5 years   HIV All women At routine exams   Lung  cancer Adults age 55 to 80 who have smoked Yearly screening in smokers with 30 pack-year history of smoking or who quit within 15 years   Obesity All women in this age group At routine exams   Osteoporosis Women who are postmenopausal Ask your healthcare provider   Syphilis Women at increased risk for infection - talk with your healthcare provider At routine exams   Tuberculosis Women at increased risk for infection - talk with your healthcare provider Ask your healthcare provider   Vision All women in this age group Ask your healthcare provider   Vaccine Who needs it How often   Chickenpox (varicella) All women in this age group who have no record of this infection or vaccine 2 doses; the second dose should be given at least 4 weeks after the first dose   Hepatitis A Women at increased risk for infection - talk with your healthcare provider 2 doses given at least 6 months apart   Hepatitis B Women at increased risk for infection - talk with your healthcare provider 3 doses over 6 months; second dose should be given 1 month after the first dose; the third dose should be given at least 2 months after the second dose and at least 4 months after the first dose   Haemophilus influenzaeType B (HIB) Women at increased risk for infection - talk with your healthcare provider 1 to 3 doses   Influenza (flu) All women in this age group Once a year   Measles, mumps, rubella (MMR) Women in this age group through their late 50s who have no record of these infections or vaccines 1 dose   Meningococcal Women at increased risk for infection - talk with your healthcare provider 1 or more doses   Pneumococcal conjugate vaccine (PCV13) and pneumococcal polysaccharide vaccine (PPSV23) Women at increased risk for infection - talk with your healthcare provider PCV13: 1 dose ages 19 to 65 (protects against 13 types of pneumococcal bacteria)  PPSV23: 1 to 2 doses through age 64, or 1 dose at 65 or older (protects against 23 types of  pneumococcal bacteria)   Tetanus/diphtheria/pertussis (Td/Tdap) booster All women in this age group Td every 10 years, or a one-time dose of Tdap instead of a Td booster after age 18, then Td every 10 years   Zoster All women ages 60 and older 1 dose   Counseling Who needs it How often   BRCA gene mutation testing for breast and ovarian cancer susceptibility Women with increased risk for having gene mutation When your risk is known   Breast cancer and chemoprevention Women at high risk for breast cancer When your risk is known   Diet and exercise Women who are overweight or obese When diagnosed, and then at routine exams   Sexually transmitted infection prevention Women at increased risk for infection - talk with your healthcare provider At routine exams   Use of daily aspirin Women ages 55 and up in this age group who are at risk for cardiovascular health problems such as stroke When your risk is known   Use of tobacco and the health effects it can cause All women in this age group Every exam   1American [employer] Society  Date Last Reviewed: 1/26/2016  © 6734-1663 [employer]. [address] [place] [address], [place] [address]. All rights reserved. This information is not intended as a substitute for professional medical care. Always follow your healthcare professional's instructions.

## 2019-02-18 NOTE — PROGRESS NOTES
"History & Physical  Gynecology      SUBJECTIVE:     Chief Complaint: Well Woman       History of Present Illness:  Annual Exam-Postmenopausal  Ms. Donato is 59 y/o female who presents for annual exam. The patient recently dx with RA for which she is on MTX but still not yet pain free. Patient reports that this causes her some frustration but otherwise doing well. The patient is sexually active once or twice a year but painful. She has tried estrogen cream but because sexual intercourse so infrequent it is painful so it is not helpful.  GYN screening history: last mammogram: approximate date 02/18/2019 and was normal. Patient is s/p hysterectomy bt is unsure if she still has her cervix in place. She reports that she has a bowel movement every few days but no issues when she goes. The patient is not taking hormone replacement therapy. Patient denies post-menopausal vaginal bleeding.  She does report that her urgency to go to the bathroom has increased. She notices that when she has to go to the restroom she has to go immediately especially in the morning. The patient wears seatbelts: yes. The patient participates in regular exercise: no. Has the patient ever been transfused or tattooed?: not asked. The patient reports that there is not domestic violence in her life.        Review of patient's allergies indicates:   Allergen Reactions    Hydromorphone Shortness Of Breath    Percocet  [oxycodone-acetaminophen] Itching and Nausea Only    Vicodin  [hydrocodone-acetaminophen] Nausea Only and Nausea And Vomiting       Past Medical History:   Diagnosis Date    Arthritis     Depression     Elevated alkaline phosphatase level     Hyperlipidemia     Osteopenia     Personal history of female infertility     Respiratory distress     " stopped Breathing" after 2 pain meds were given in a close time frame to one another.    Thyroid disease     multinodular goiter    Vitamin D deficiency disease      Past Surgical " History:   Procedure Laterality Date    BREAST BIOPSY Left     over 10 yrs ago    BREAST SURGERY      benign biopsy on left    COLONOSCOPY N/A 2013    [facility]    HYSTERECTOMY      HYSTERECTOMY, SUPRACERVICAL  10/28/2013    [facility] OR    LAPAROSCOPY W/ MINI-LAPAROTOMY      for fertility issues    OOPHORECTOMY      TN REMOVAL OF OVARY/TUBE(S)      RECTAL SURGERY      excess skin growth removal    SALPINGO-OOPHORECTOMY, BILATERAL Bilateral 10/28/2013    [facility] OR    supracervical hysterectomy      TONSILLECTOMY       OB History      Para Term  AB Living    0 0 0 0 0 0    SAB TAB Ectopic Multiple Live Births    0 0 0 0 0        Family History   Problem Relation Age of Onset    Breast cancer Mother     Hyperlipidemia Mother     Emphysema Father     No Known Problems Sister     No Known Problems Brother     Macular degeneration Maternal Aunt     No Known Problems Maternal Uncle     No Known Problems Paternal Aunt     No Known Problems Paternal Uncle     No Known Problems Maternal Grandmother     No Known Problems Maternal Grandfather     No Known Problems Paternal Grandmother     No Known Problems Paternal Grandfather     Colon cancer Neg Hx     Ovarian cancer Neg Hx     Melanoma Neg Hx     Amblyopia Neg Hx     Blindness Neg Hx     Cancer Neg Hx     Cataracts Neg Hx     Diabetes Neg Hx     Hypertension Neg Hx     Retinal detachment Neg Hx     Strabismus Neg Hx     Stroke Neg Hx     Thyroid disease Neg Hx     Glaucoma Neg Hx      Social History     Tobacco Use    Smoking status: Former Smoker     Packs/day: 2.50     Years: 30.00     Pack years: 75.00    Smokeless tobacco: Never Used   Substance Use Topics    Alcohol use: No     Frequency: Never     Comment: Rare    Drug use: No       Current Outpatient Medications   Medication Sig    albuterol (PROVENTIL  "HFA/VENTOLIN HFA) 200 puff inhaler Inhale 2 puffs into the lungs every 4 (four) hours as needed. 2 HFA Aerosol Inhaler Inhalation Every 4-6 hours    ergocalciferol (ERGOCALCIFEROL) 50,000 unit Cap TAKE 1 CAPSULE BY MOUTH EVERY 7 DAYS.    folic acid (FOLVITE) 1 MG tablet Take 1 tablet (1 mg total) by mouth once daily.    levothyroxine (SYNTHROID) 100 MCG tablet Take 1 tablet (100 mcg total) by mouth once daily.    meloxicam (MOBIC) 15 MG tablet TAKE 1 TABLET(15 MG) BY MOUTH EVERY DAY AS NEEDED    methotrexate 25 mg/mL injection Inject 0.6 mLs (15 mg total) into the skin every 7 days    syringe and needle,insulin,1mL (INSULIN SYRINGE-NEEDLE U-100) 1 mL 29 gauge x 7/16" Syrg U TO INJECT 0.6 ML ONCE A WEEK    ASPIRIN/CAFFEINE/DIHYDROCODEIN (DIHYDROCODEINE-ASPIRIN-CAFF ORAL) Take by mouth.    methotrexate 2.5 MG Tab TAKE 6 TABLETS(15 MG) BY MOUTH EVERY 7 DAYS     No current facility-administered medications for this visit.      Review of Systems:  Review of Systems   Constitutional: Negative for chills and fever.   Respiratory: Negative for cough and wheezing.    Cardiovascular: Negative for chest pain and palpitations.   Gastrointestinal: Negative for abdominal pain, nausea and vomiting.   Genitourinary: Negative for dysuria, frequency, hematuria, pelvic pain, vaginal bleeding, vaginal discharge, vaginal pain and postmenopausal bleeding.        Urgency in the AM        OBJECTIVE:     Physical Exam:  Physical Exam   Constitutional: She is oriented to person, place, and time. She appears well-developed and well-nourished.   Cardiovascular: Normal rate and normal heart sounds.   Pulmonary/Chest: Effort normal. No respiratory distress. Breasts are symmetrical. There is no breast swelling.   Abdominal: Soft. She exhibits no distension. There is no tenderness.   Genitourinary: Vagina normal. Rectal exam shows external hemorrhoid. No breast tenderness, discharge or bleeding. No bleeding in the vagina. No vaginal " discharge found.   Genitourinary Comments: Uterus absent but cervix in place   Neurological: She is oriented to person, place, and time.   Skin: Skin is warm and dry.   Psychiatric: She has a normal mood and affect.   Vitals reviewed.      ASSESSMENT:       ICD-10-CM ICD-9-CM    1. Well woman exam with routine gynecological exam Z01.419 V72.31    2. Vaginal atrophy N95.2 627.3    3. Urinary urgency R39.15 788.63        Plan:      Darlyn was seen today for well woman.    Diagnoses and all orders for this visit:    Well woman exam with routine gynecological exam  - Pap smear due 2020  - Mammogram done today   - Colonoscopy done 01/2019    Vaginal atrophy  - Declines estrogen cream as patient only sexually active 1-2x a week 2/2 's issue which causes intercourse to still be painful    Urinary urgency  - Discussed diet and possible triggers  - Lifestyle modifications discussed with patient as medications not an option at this time with new RA diagnosis and medications.   - Will RTC for further management if it urgency gets worse or incontinence begins      No orders of the defined types were placed in this encounter.      Follow-up in about 1 year (around 2/18/2020) for WWE.    Counseling time: 15 minutes    Jaskaran Bain

## 2019-02-18 NOTE — LETTER
February 18, 2019      Amando Prajapati MD  [address] [facility] [address]           [facility] [place] - OB/ GYN  [address] [facility] [address]  [facility] [address]  Phone: [phone]          Patient: Darlyn Donato   MR Number: [id number]   YOB: 1960   Date of Visit: 2/18/2019       Dear Dr. Amando Prajapati:    Thank you for referring Darlyn Donato to me for evaluation. Attached you will find relevant portions of my assessment and plan of care.    If you have questions, please do not hesitate to call me. I look forward to following Darlyn Donato along with you.    Sincerely,    Jaskaran Bain MD    Enclosure  CC:  No Recipients    If you would like to receive this communication electronically, please contact [email or web address] or [phone] to request more information on [employer] access.    For providers and/or their staff who would like to refer a patient to [facility], please contact us through our one-stop-shop provider referral line, [facility], at [phone].    If you feel you have received this communication in error or would no longer like to receive these types of communications, please e-mail [email or web address]

## 2019-03-07 ENCOUNTER — LAB VISIT (OUTPATIENT)
Dept: LAB | Facility: HOSPITAL | Age: 59
End: 2019-03-07
Attending: STUDENT IN AN ORGANIZED HEALTH CARE EDUCATION/TRAINING PROGRAM
Payer: COMMERCIAL

## 2019-03-07 DIAGNOSIS — M05.79 RHEUMATOID ARTHRITIS INVOLVING MULTIPLE SITES WITH POSITIVE RHEUMATOID FACTOR: ICD-10-CM

## 2019-03-07 DIAGNOSIS — Z79.60 LONG-TERM USE OF IMMUNOSUPPRESSANT MEDICATION: ICD-10-CM

## 2019-03-07 LAB
ALBUMIN SERPL BCP-MCNC: 3.5 G/DL
ALP SERPL-CCNC: 129 U/L
ALT SERPL W/O P-5'-P-CCNC: 15 U/L
ANION GAP SERPL CALC-SCNC: 8 MMOL/L
AST SERPL-CCNC: 10 U/L
BASOPHILS # BLD AUTO: 0.05 K/UL
BASOPHILS NFR BLD: 0.6 %
BILIRUB SERPL-MCNC: 0.5 MG/DL
BUN SERPL-MCNC: 9 MG/DL
CALCIUM SERPL-MCNC: 9.3 MG/DL
CHLORIDE SERPL-SCNC: 106 MMOL/L
CO2 SERPL-SCNC: 28 MMOL/L
CREAT SERPL-MCNC: 0.7 MG/DL
CRP SERPL-MCNC: 7.8 MG/L
DIFFERENTIAL METHOD: ABNORMAL
EOSINOPHIL # BLD AUTO: 0.2 K/UL
EOSINOPHIL NFR BLD: 2.8 %
ERYTHROCYTE [DISTWIDTH] IN BLOOD BY AUTOMATED COUNT: 13.2 %
ERYTHROCYTE [SEDIMENTATION RATE] IN BLOOD BY WESTERGREN METHOD: 30 MM/HR
EST. GFR  (AFRICAN AMERICAN): >60 ML/MIN/1.73 M^2
EST. GFR  (NON AFRICAN AMERICAN): >60 ML/MIN/1.73 M^2
GLUCOSE SERPL-MCNC: 94 MG/DL
HCT VFR BLD AUTO: 40.1 %
HGB BLD-MCNC: 12.9 G/DL
IMM GRANULOCYTES # BLD AUTO: 0.01 K/UL
IMM GRANULOCYTES NFR BLD AUTO: 0.1 %
LYMPHOCYTES # BLD AUTO: 2.1 K/UL
LYMPHOCYTES NFR BLD: 27.2 %
MCH RBC QN AUTO: 30.2 PG
MCHC RBC AUTO-ENTMCNC: 32.2 G/DL
MCV RBC AUTO: 94 FL
MONOCYTES # BLD AUTO: 0.5 K/UL
MONOCYTES NFR BLD: 5.9 %
NEUTROPHILS # BLD AUTO: 5 K/UL
NEUTROPHILS NFR BLD: 63.4 %
NRBC BLD-RTO: 0 /100 WBC
PLATELET # BLD AUTO: 351 K/UL
PMV BLD AUTO: 10.1 FL
POTASSIUM SERPL-SCNC: 3.6 MMOL/L
PROT SERPL-MCNC: 6.5 G/DL
RBC # BLD AUTO: 4.27 M/UL
SODIUM SERPL-SCNC: 142 MMOL/L
WBC # BLD AUTO: 7.84 K/UL

## 2019-03-07 PROCEDURE — 86140 C-REACTIVE PROTEIN: CPT

## 2019-03-07 PROCEDURE — 80053 COMPREHEN METABOLIC PANEL: CPT

## 2019-03-07 PROCEDURE — 85025 COMPLETE CBC W/AUTO DIFF WBC: CPT

## 2019-03-07 PROCEDURE — 85652 RBC SED RATE AUTOMATED: CPT

## 2019-03-07 PROCEDURE — 36415 COLL VENOUS BLD VENIPUNCTURE: CPT | Mod: PO

## 2019-03-13 ENCOUNTER — TELEPHONE (OUTPATIENT)
Dept: PHARMACY | Facility: CLINIC | Age: 59
End: 2019-03-13

## 2019-03-13 NOTE — TELEPHONE ENCOUNTER
Patient called to refill MTX.  Confirmed patient has 1 dose left for injection on 3/17/19.  Ship 3/18/19 for 3/19/19 delivery.  Verified address.  Copay $3.53 in 004.  CCOF.  Patient confirmed no new medications, health conditions, or allergies.  Declined [facility] .

## 2019-03-13 NOTE — PROGRESS NOTES
RHEUMATOLOGY CLINIC FOLLOW UP VISIT  Chief complaints:- bilateral LE pain       HPI:-  Darlyn Ibarra a 59 y.o. pleasant female with history of non-erosive migratory arthritis (+ and +).  Patient was started on MTX 15mg Qweekly in May 2018 following findings of synovitis of the hands and ankles.      Today complaining of bilateral ankle and feet pain that started a couple weeks ago, following increased walking (shopping).  Pain was sharp in nature and had resolved significantly already.  Still having 3-5/10 pain everyday.  Pain is controlled with meloxicam daily.     After starting MTX, pain is better controlled.  Prior to the starting of MTX, patient was unable to walk.  Now, she is able to ambulate without assistance.  Pain is more a dull sensation but persistent and constant.   Tolerating medication well without any complications.  Compliant with all home medications.       Associated symptoms include bilateral hand and feet swelling.  AM stiffness of a couple of hours (feet/ankles).  Patient continues to feel generalized fatigue/maliase after ~1 hour of ambulation.     Interval History  Tolerating and compliant with MTX SQ and folic acid.  Patient continues to take Mobic daily (in the AM).  Significant improvement of hand swelling and pain.  Continues to have bilateral ankle and foot pain.  Mild improvement with MTX SQ.  Patient states that she has good and bad days where symptoms fluctuates.  Denies any recent trauma to the areas.      AM stiffness x 45 mins.      Denies any fever/chills, recent trauma, N/V, mouth ulcer, GI upset, diarrhea/constipation, urinary symptoms, SOB, or CP    Review of Systems   Constitutional: Negative for chills, diaphoresis, fever, malaise/fatigue and weight loss.   HENT: Negative for congestion, ear discharge, ear pain, hearing loss, nosebleeds, sinus pain and tinnitus.    Eyes: Negative for blurred vision,  "photophobia, pain, discharge and redness.   Respiratory: Negative for cough, hemoptysis, sputum production, shortness of breath, wheezing and stridor.    Cardiovascular: Negative for chest pain, palpitations, orthopnea, claudication, leg swelling and PND.   Gastrointestinal: Negative for abdominal pain, constipation, diarrhea, heartburn, nausea and vomiting.   Genitourinary: Negative for dysuria, frequency, hematuria and urgency.   Musculoskeletal: Positive for joint pain. Negative for back pain, myalgias and neck pain.   Skin: Negative for rash.   Neurological: Negative for dizziness, tingling, tremors, weakness and headaches.   Endo/Heme/Allergies: Does not bruise/bleed easily.   Psychiatric/Behavioral: Negative for depression, hallucinations and suicidal ideas. The patient is not nervous/anxious and does not have insomnia.        Past Medical History:   Diagnosis Date    Arthritis     Depression     Elevated alkaline phosphatase level     Hyperlipidemia     Osteopenia     Personal history of female infertility     Respiratory distress     " stopped Breathing" after 2 pain meds were given in a close time frame to one another.    Thyroid disease     multinodular goiter    Vitamin D deficiency disease        Past Surgical History:   Procedure Laterality Date    BREAST BIOPSY Left     over 10 yrs ago    BREAST SURGERY      benign biopsy on left    COLONOSCOPY N/A 2/19/2013    [facility]O    HYSTERECTOMY      HYSTERECTOMY, SUPRACERVICAL  10/28/2013    [facility] OR    LAPAROSCOPY W/ MINI-LAPAROTOMY      for fertility issues    OOPHORECTOMY      GA REMOVAL OF OVARY/TUBE(S)      RECTAL SURGERY      excess skin growth removal    SALPINGO-OOPHORECTOMY, BILATERAL Bilateral 10/28/2013    [facility]    supracervical hysterectomy      TONSILLECTOMY          Social History     Tobacco Use    Smoking status: " "Former Smoker     Packs/day: 2.50     Years: 30.00     Pack years: 75.00    Smokeless tobacco: Never Used   Substance Use Topics    Alcohol use: No     Frequency: Never     Comment: Rare    Drug use: No       Family History   Problem Relation Age of Onset    Breast cancer Mother     Hyperlipidemia Mother     Emphysema Father     No Known Problems Sister     No Known Problems Brother     Macular degeneration Maternal Aunt     No Known Problems Maternal Uncle     No Known Problems Paternal Aunt     No Known Problems Paternal Uncle     No Known Problems Maternal Grandmother     No Known Problems Maternal Grandfather     No Known Problems Paternal Grandmother     No Known Problems Paternal Grandfather     Colon cancer Neg Hx     Ovarian cancer Neg Hx     Melanoma Neg Hx     Amblyopia Neg Hx     Blindness Neg Hx     Cancer Neg Hx     Cataracts Neg Hx     Diabetes Neg Hx     Hypertension Neg Hx     Retinal detachment Neg Hx     Strabismus Neg Hx     Stroke Neg Hx     Thyroid disease Neg Hx     Glaucoma Neg Hx        Review of patient's allergies indicates:   Allergen Reactions    Hydromorphone Shortness Of Breath    Percocet  [oxycodone-acetaminophen] Itching and Nausea Only    Vicodin  [hydrocodone-acetaminophen] Nausea Only and Nausea And Vomiting           Physical examination:-    Vitals:    03/14/19 0923   BP: 126/82   Pulse: 66   Weight: 70.3 kg (154 lb 15.7 oz)   Height: 5' 2" (1.575 m)   PainSc:   3       Physical Exam   Constitutional: She is oriented to person, place, and time and well-developed, well-nourished, and in no distress.   HENT:   Head: Normocephalic and atraumatic.   Eyes: Conjunctivae are normal. Pupils are equal, round, and reactive to light.   Neck: Normal range of motion. Neck supple.   Cardiovascular: Normal rate, regular rhythm and normal heart sounds.   Pulmonary/Chest: Effort normal and breath sounds normal.   Abdominal: Soft. Bowel sounds are normal. "   Musculoskeletal: Normal range of motion.   Bilateral ankle swelling  +squeeze test bilaterally   No signs of enthesis   Decrease  strength of the R hand compared with L   No swelling or signs of synovitis of the hand  ROM complete in all joints     Neurological: She is alert and oriented to person, place, and time. Gait normal.   Skin: Skin is warm and dry.   No rashes   Psychiatric: Mood, memory, affect and judgment normal.       Labs:-  Results for BRITTNEY REICH (MRN [id number]) as of 3/13/2019 12:12   Ref. Range 10/15/2018 08:14 12/6/2018 08:20 1/28/2019 09:20 2/18/2019 08:20 3/7/2019 08:31   WBC Latest Ref Range: 3.90 - 12.70 K/uL 9.00 9.25   7.84   RBC Latest Ref Range: 4.00 - 5.40 M/uL 4.27 4.45   4.27   Hemoglobin Latest Ref Range: 12.0 - 16.0 g/dL 13.0 13.4   12.9   Hematocrit Latest Ref Range: 37.0 - 48.5 % 42.0 42.4   40.1   MCV Latest Ref Range: 82 - 98 fL 98 95   94   MCH Latest Ref Range: 27.0 - 31.0 pg 30.4 30.1   30.2   MCHC Latest Ref Range: 32.0 - 36.0 g/dL 31.0 (L) 31.6 (L)   32.2   RDW Latest Ref Range: 11.5 - 14.5 % 13.1 12.9   13.2   Platelets Latest Ref Range: 150 - 350 K/uL 344 355 (H)   351 (H)   MPV Latest Ref Range: 9.2 - 12.9 fL 10.2 10.2   10.1   Gran% Latest Ref Range: 38.0 - 73.0 % 64.1 65.7   63.4   Gran # (ANC) Latest Ref Range: 1.8 - 7.7 K/uL 5.8 6.1   5.0   Immature Grans (Abs) Latest Ref Range: 0.00 - 0.04 K/uL 0.02 0.03   0.01   Lymph% Latest Ref Range: 18.0 - 48.0 % 24.0 24.4   27.2   Lymph # Latest Ref Range: 1.0 - 4.8 K/uL 2.2 2.3   2.1   Mono% Latest Ref Range: 4.0 - 15.0 % 7.8 6.2   5.9   Mono # Latest Ref Range: 0.3 - 1.0 K/uL 0.7 0.6   0.5   Eosinophil% Latest Ref Range: 0.0 - 8.0 % 3.2 2.6   2.8   Eos # Latest Ref Range: 0.0 - 0.5 K/uL 0.3 0.2   0.2   Basophil% Latest Ref Range: 0.0 - 1.9 % 0.7 0.8   0.6   Baso # Latest Ref Range: 0.00 - 0.20 K/uL 0.06 0.07   0.05   nRBC Latest Ref Range: 0 /100 WBC 0 0   0   Differential Method Unknown Automated Automated   Automated    Immature Granulocytes Latest Ref Range: 0.0 - 0.5 % 0.2 0.3   0.1   Sed Rate Latest Ref Range: 0 - 36 mm/Hr 35 16   30   Sodium Latest Ref Range: 136 - 145 mmol/L 143 143   142   Potassium Latest Ref Range: 3.5 - 5.1 mmol/L 3.9 4.1   3.6   Chloride Latest Ref Range: 95 - 110 mmol/L 108 106   106   CO2 Latest Ref Range: 23 - 29 mmol/L 27 29   28   Anion Gap Latest Ref Range: 8 - 16 mmol/L 8 8   8   BUN, Bld Latest Ref Range: 6 - 20 mg/dL 13 10   9   Creatinine Latest Ref Range: 0.5 - 1.4 mg/dL 0.7 0.8   0.7   eGFR if non African American Latest Ref Range: >60 mL/min/1.73 m^2 >60.0 >60.0   >60.0   eGFR if African American Latest Ref Range: >60 mL/min/1.73 m^2 >60.0 >60.0   >60.0   Glucose Latest Ref Range: 70 - 110 mg/dL 91 98   94   Calcium Latest Ref Range: 8.7 - 10.5 mg/dL 9.4 9.7   9.3   Alkaline Phosphatase Latest Ref Range: 55 - 135 U/L 125 134   129   Total Protein Latest Ref Range: 6.0 - 8.4 g/dL 6.7 7.1   6.5   Albumin Latest Ref Range: 3.5 - 5.2 g/dL 3.6 3.7   3.5   Total Bilirubin Latest Ref Range: 0.1 - 1.0 mg/dL 0.4 0.6   0.5   AST Latest Ref Range: 10 - 40 U/L 8 (L) 10   10   ALT Latest Ref Range: 10 - 44 U/L 11 16   15   CRP Latest Ref Range: 0.0 - 8.2 mg/L 7.8 11.7 (H)   7.8   Triglycerides Latest Ref Range: 30 - 150 mg/dL   161 (H)     Cholesterol Latest Ref Range: 120 - 199 mg/dL   194     HDL Latest Ref Range: 40 - 75 mg/dL   33 (L)     HDL/Chol Ratio Latest Ref Range: 20.0 - 50.0 %   17.0 (L)     LDL Cholesterol Latest Ref Range: 63.0 - 159.0 mg/dL   128.8     Non-HDL Cholesterol Latest Units: mg/dL   161     Total Cholesterol/HDL Ratio Latest Ref Range: 2.0 - 5.0    5.9 (H)     Vit D, 25-Hydroxy Latest Ref Range: 30 - 96 ng/mL   27 (L)     Hemoglobin A1C External Latest Ref Range: 4.0 - 5.6 %   5.6     Estimated Avg Glucose Latest Ref Range: 68 - 131 mg/dL   114     TSH Latest Ref Range: 0.400 - 4.000 uIU/mL   1.102     Free T4 Latest Ref Range: 0.71 - 1.51 ng/dL   1.56 (H)     Mitogen - Nil  "Latest Ref Range: See text IU/mL  8.070      NIL Latest Ref Range: See text IU/mL  0.040      TB Gold Plus Unknown  Negative      TB1 - Nil Latest Ref Range: See text IU/mL  0.040      TB2 - Nil Latest Ref Range: See text IU/mL  -0.010        Independent visualization of images done.   Xray of hand bilateral - no fracture or erosion  X ray foot - bilateral foot mild DJD    Medication List with Changes/Refills   Current Medications    ALBUTEROL (PROVENTIL HFA/VENTOLIN HFA) 200 PUFF INHALER    Inhale 2 puffs into the lungs every 4 (four) hours as needed. 2 HFA Aerosol Inhaler Inhalation Every 4-6 hours    ERGOCALCIFEROL (ERGOCALCIFEROL) 50,000 UNIT CAP    TAKE 1 CAPSULE BY MOUTH EVERY 7 DAYS.    LEVOTHYROXINE (SYNTHROID) 100 MCG TABLET    Take 1 tablet (100 mcg total) by mouth once daily.    SYRINGE AND NEEDLE,INSULIN,1ML (INSULIN SYRINGE-NEEDLE U-100) 1 ML 29 GAUGE X 7/16" SYRG    U TO INJECT 0.6 ML ONCE A WEEK   Changed and/or Refilled Medications    Modified Medication Previous Medication    FOLIC ACID (FOLVITE) 1 MG TABLET folic acid (FOLVITE) 1 MG tablet       Take 1 tablet (1 mg total) by mouth once daily.    Take 1 tablet (1 mg total) by mouth once daily.    MELOXICAM (MOBIC) 15 MG TABLET meloxicam (MOBIC) 15 MG tablet       TAKE 1 TABLET(15 MG) BY MOUTH EVERY DAY AS NEEDED    TAKE 1 TABLET(15 MG) BY MOUTH EVERY DAY AS NEEDED    METHOTREXATE 25 MG/ML INJECTION methotrexate 25 mg/mL injection       Inject 0.6 mLs (15 mg total) into the skin every 7 days    Inject 0.6 mLs (15 mg total) into the skin every 7 days   Discontinued Medications    ASPIRIN/CAFFEINE/DIHYDROCODEIN (DIHYDROCODEINE-ASPIRIN-CAFF ORAL)    Take by mouth.    METHOTREXATE 2.5 MG TAB    TAKE 6 TABLETS(15 MG) BY MOUTH EVERY 7 DAYS       Assessment/Plans:-   58 y.o. pleasant female with history of non-erosive migratory arthritis (+ and +).  Patient was started on MTX 15mg Qweekly in May 2018 following findings of synovitis of the hands " and ankles.    Currently complaining of bilateral ankle and feet pain that started after intense walking around.  Pain had since subsided a little.  Patient continues to feel diffused aches and AM stiffness. MTX 15mg Qweekly PO changed to MTX 0.6mg SQ for better absorption.      Improvement with hands symptoms but continues to have bilateral ankle and feet symptoms.  Previous Xray of the feet show +DJD.  Concern about ankle OA.  Will need most specific imaging.      DAS28 CRP - 2.41 low activity (CRP 7.8) decreased from last clinic visit.     At this time, uncertain if RA is still active vs OA.  Patient does not want kenlog IM at this time.  She also does not want to increase RA medication - opting for more conservative management.     Plan  - Continue MTX 0.6mg (15mg Qweekl) - refill provided.  Consideration to increase to 0.8mg at the next visit if no improvement in symptoms  - Continue folate acid daily - refill provided   - Continue Mobic PRN as needed for pain - refill provided  - Referral to PT  - Xray of bilateral ankles   - Repeat CBC, CMP, ESR/CRP prior to the next visit            # Follow-up in about 4 weeks (around 4/11/2019).

## 2019-03-14 ENCOUNTER — OFFICE VISIT (OUTPATIENT)
Dept: RHEUMATOLOGY | Facility: CLINIC | Age: 59
End: 2019-03-14
Payer: COMMERCIAL

## 2019-03-14 ENCOUNTER — HOSPITAL ENCOUNTER (OUTPATIENT)
Dept: RADIOLOGY | Facility: HOSPITAL | Age: 59
Discharge: HOME | End: 2019-03-14
Attending: STUDENT IN AN ORGANIZED HEALTH CARE EDUCATION/TRAINING PROGRAM
Payer: COMMERCIAL

## 2019-03-14 VITALS
WEIGHT: 155 LBS | HEART RATE: 66 BPM | BODY MASS INDEX: 28.52 KG/M2 | HEIGHT: 62 IN | DIASTOLIC BLOOD PRESSURE: 82 MMHG | SYSTOLIC BLOOD PRESSURE: 126 MMHG

## 2019-03-14 DIAGNOSIS — M05.79 RHEUMATOID ARTHRITIS INVOLVING MULTIPLE SITES WITH POSITIVE RHEUMATOID FACTOR: ICD-10-CM

## 2019-03-14 DIAGNOSIS — M19.90 OSTEOARTHRITIS, UNSPECIFIED OSTEOARTHRITIS TYPE, UNSPECIFIED SITE: ICD-10-CM

## 2019-03-14 DIAGNOSIS — Z79.60 LONG-TERM USE OF IMMUNOSUPPRESSANT MEDICATION: Primary | ICD-10-CM

## 2019-03-14 PROCEDURE — 99214 PR OFFICE/OUTPT VISIT, EST, LEVL IV, 30-39 MIN: ICD-10-PCS | Mod: S$GLB,,, | Performed by: STUDENT IN AN ORGANIZED HEALTH CARE EDUCATION/TRAINING PROGRAM

## 2019-03-14 PROCEDURE — 99999 PR PBB SHADOW E&M-EST. PATIENT-LVL III: ICD-10-PCS | Mod: PBBFAC,,, | Performed by: STUDENT IN AN ORGANIZED HEALTH CARE EDUCATION/TRAINING PROGRAM

## 2019-03-14 PROCEDURE — 3074F SYST BP LT 130 MM HG: CPT | Mod: CPTII,S$GLB,, | Performed by: STUDENT IN AN ORGANIZED HEALTH CARE EDUCATION/TRAINING PROGRAM

## 2019-03-14 PROCEDURE — 3008F BODY MASS INDEX DOCD: CPT | Mod: CPTII,S$GLB,, | Performed by: STUDENT IN AN ORGANIZED HEALTH CARE EDUCATION/TRAINING PROGRAM

## 2019-03-14 PROCEDURE — 73610 X-RAY EXAM OF ANKLE: CPT | Mod: 26,,, | Performed by: RADIOLOGY

## 2019-03-14 PROCEDURE — 3079F PR MOST RECENT DIASTOLIC BLOOD PRESSURE 80-89 MM HG: ICD-10-PCS | Mod: CPTII,S$GLB,, | Performed by: STUDENT IN AN ORGANIZED HEALTH CARE EDUCATION/TRAINING PROGRAM

## 2019-03-14 PROCEDURE — 73610 XR ANKLE COMPLETE 3 VIEW BILATERAL: ICD-10-PCS | Mod: 26,,, | Performed by: RADIOLOGY

## 2019-03-14 PROCEDURE — 3074F PR MOST RECENT SYSTOLIC BLOOD PRESSURE < 130 MM HG: ICD-10-PCS | Mod: CPTII,S$GLB,, | Performed by: STUDENT IN AN ORGANIZED HEALTH CARE EDUCATION/TRAINING PROGRAM

## 2019-03-14 PROCEDURE — 99999 PR PBB SHADOW E&M-EST. PATIENT-LVL III: CPT | Mod: PBBFAC,,, | Performed by: STUDENT IN AN ORGANIZED HEALTH CARE EDUCATION/TRAINING PROGRAM

## 2019-03-14 PROCEDURE — 73610 X-RAY EXAM OF ANKLE: CPT | Mod: 50,TC

## 2019-03-14 PROCEDURE — 3008F PR BODY MASS INDEX (BMI) DOCUMENTED: ICD-10-PCS | Mod: CPTII,S$GLB,, | Performed by: STUDENT IN AN ORGANIZED HEALTH CARE EDUCATION/TRAINING PROGRAM

## 2019-03-14 PROCEDURE — 3079F DIAST BP 80-89 MM HG: CPT | Mod: CPTII,S$GLB,, | Performed by: STUDENT IN AN ORGANIZED HEALTH CARE EDUCATION/TRAINING PROGRAM

## 2019-03-14 PROCEDURE — 99214 OFFICE O/P EST MOD 30 MIN: CPT | Mod: S$GLB,,, | Performed by: STUDENT IN AN ORGANIZED HEALTH CARE EDUCATION/TRAINING PROGRAM

## 2019-03-14 RX ORDER — METHOTREXATE 25 MG/ML
15 INJECTION, SOLUTION INTRA-ARTERIAL; INTRAMUSCULAR; INTRAVENOUS WEEKLY
Qty: 4 ML | Refills: 3 | Status: SHIPPED | OUTPATIENT
Start: 2019-03-14 | End: 2019-05-09 | Stop reason: SDUPTHER

## 2019-03-14 RX ORDER — MELOXICAM 15 MG/1
TABLET ORAL
Qty: 30 TABLET | Refills: 2 | Status: SHIPPED | OUTPATIENT
Start: 2019-03-14 | End: 2020-07-03

## 2019-03-14 RX ORDER — FOLIC ACID 1 MG/1
1 TABLET ORAL DAILY
Qty: 30 TABLET | Refills: 11 | Status: SHIPPED | OUTPATIENT
Start: 2019-03-14 | End: 2019-08-21 | Stop reason: SDUPTHER

## 2019-03-14 ASSESSMENT — DISEASE ACTIVITY SCORE (DAS28)
TENDER_JOINTS_COUNT: 0
TOTAL_SCORE_CRP: 2.41
SWOLLEN_JOINTS_COUNT: 0
CRP_MG_PER_LITER: 7.8
ESR_MM_PER_HR: 30
GLOBAL_HEALTH_SCORE: 48
TOTAL_SCORE_ESR: 3.05
CRP_MG_PER_LITER: 7.8
TENDER_JOINTS_COUNT: 4
ESR_MM_PER_HR: 30
SWOLLEN_JOINTS_COUNT: 2

## 2019-03-14 ASSESSMENT — ROUTINE ASSESSMENT OF PATIENT INDEX DATA (RAPID3)
AM STIFFNESS SCORE: 1, YES
PAIN SCORE: 3
FATIGUE SCORE: 8
MDHAQ FUNCTION SCORE: 0
PATIENT GLOBAL ASSESSMENT SCORE: 4
PSYCHOLOGICAL DISTRESS SCORE: 0
TOTAL RAPID3 SCORE: 2.33

## 2019-03-14 NOTE — PROGRESS NOTES
I have reviewed the notes, assessments,documentation by     58 year old seropos nonerosive RA     We have changed to sc mtx 0.6 ml weekly  Folic acid+    She has done better  But not completely well    Fatigue +  Malaise+     Labs normal CBC,CMP,ESR,CRP    Exam   Ankle synovitis     Plan     Ankle xrays  Ankle PT  Continue current dose of mtx and folic acid    rtc in 4 to 6 weeks  Continue meloxicam

## 2019-03-14 NOTE — PROGRESS NOTES
I have reviewed the notes, assessments,documentation by      58 year old seropos nonerosive RA     We have changed to sc mtx 0.6 ml weekly  Folic acid+     She has done better  But not completely well     Fatigue +  Malaise+     Labs normal CBC,CMP,ESR,CRP     Exam   Ankle synovitis     Plan      Ankle xrays  Ankle PT  Continue current dose of mtx and folic acid     rtc in 4 to 6 weeks  Continue meloxicam prn

## 2019-04-09 ENCOUNTER — TELEPHONE (OUTPATIENT)
Dept: PHARMACY | Facility: CLINIC | Age: 59
End: 2019-04-09

## 2019-04-24 ENCOUNTER — TELEPHONE (OUTPATIENT)
Dept: PHARMACY | Facility: CLINIC | Age: 59
End: 2019-04-24

## 2019-04-25 ENCOUNTER — LAB VISIT (OUTPATIENT)
Dept: LAB | Facility: HOSPITAL | Age: 59
End: 2019-04-25
Attending: STUDENT IN AN ORGANIZED HEALTH CARE EDUCATION/TRAINING PROGRAM
Payer: COMMERCIAL

## 2019-04-25 DIAGNOSIS — M19.90 OSTEOARTHRITIS, UNSPECIFIED OSTEOARTHRITIS TYPE, UNSPECIFIED SITE: ICD-10-CM

## 2019-04-25 DIAGNOSIS — Z79.60 LONG-TERM USE OF IMMUNOSUPPRESSANT MEDICATION: ICD-10-CM

## 2019-04-25 DIAGNOSIS — M05.79 RHEUMATOID ARTHRITIS INVOLVING MULTIPLE SITES WITH POSITIVE RHEUMATOID FACTOR: ICD-10-CM

## 2019-04-25 LAB
ALBUMIN SERPL BCP-MCNC: 3.6 G/DL (ref 3.5–5.2)
ALP SERPL-CCNC: 137 U/L (ref 55–135)
ALT SERPL W/O P-5'-P-CCNC: 16 U/L (ref 10–44)
ANION GAP SERPL CALC-SCNC: 8 MMOL/L (ref 8–16)
AST SERPL-CCNC: 10 U/L (ref 10–40)
BASOPHILS # BLD AUTO: 0.06 K/UL (ref 0–0.2)
BASOPHILS NFR BLD: 0.7 % (ref 0–1.9)
BILIRUB SERPL-MCNC: 0.5 MG/DL (ref 0.1–1)
BUN SERPL-MCNC: 11 MG/DL (ref 6–20)
CALCIUM SERPL-MCNC: 9.5 MG/DL (ref 8.7–10.5)
CHLORIDE SERPL-SCNC: 104 MMOL/L (ref 95–110)
CO2 SERPL-SCNC: 28 MMOL/L (ref 23–29)
CREAT SERPL-MCNC: 0.8 MG/DL (ref 0.5–1.4)
CRP SERPL-MCNC: 9.6 MG/L (ref 0–8.2)
DIFFERENTIAL METHOD: NORMAL
EOSINOPHIL # BLD AUTO: 0.2 K/UL (ref 0–0.5)
EOSINOPHIL NFR BLD: 2.5 % (ref 0–8)
ERYTHROCYTE [DISTWIDTH] IN BLOOD BY AUTOMATED COUNT: 13.7 % (ref 11.5–14.5)
ERYTHROCYTE [SEDIMENTATION RATE] IN BLOOD BY WESTERGREN METHOD: 27 MM/HR (ref 0–36)
EST. GFR  (AFRICAN AMERICAN): >60 ML/MIN/1.73 M^2
EST. GFR  (NON AFRICAN AMERICAN): >60 ML/MIN/1.73 M^2
GLUCOSE SERPL-MCNC: 94 MG/DL (ref 70–110)
HCT VFR BLD AUTO: 41 % (ref 37–48.5)
HGB BLD-MCNC: 13.1 G/DL (ref 12–16)
IMM GRANULOCYTES # BLD AUTO: 0.02 K/UL (ref 0–0.04)
IMM GRANULOCYTES NFR BLD AUTO: 0.2 % (ref 0–0.5)
LYMPHOCYTES # BLD AUTO: 2.1 K/UL (ref 1–4.8)
LYMPHOCYTES NFR BLD: 24.8 % (ref 18–48)
MCH RBC QN AUTO: 30.3 PG (ref 27–31)
MCHC RBC AUTO-ENTMCNC: 32 G/DL (ref 32–36)
MCV RBC AUTO: 95 FL (ref 82–98)
MONOCYTES # BLD AUTO: 0.5 K/UL (ref 0.3–1)
MONOCYTES NFR BLD: 5.7 % (ref 4–15)
NEUTROPHILS # BLD AUTO: 5.5 K/UL (ref 1.8–7.7)
NEUTROPHILS NFR BLD: 66.1 % (ref 38–73)
NRBC BLD-RTO: 0 /100 WBC
PLATELET # BLD AUTO: 344 K/UL (ref 150–350)
PMV BLD AUTO: 10.4 FL (ref 9.2–12.9)
POTASSIUM SERPL-SCNC: 3.9 MMOL/L (ref 3.5–5.1)
PROT SERPL-MCNC: 6.9 G/DL (ref 6–8.4)
RBC # BLD AUTO: 4.33 M/UL (ref 4–5.4)
SODIUM SERPL-SCNC: 140 MMOL/L (ref 136–145)
WBC # BLD AUTO: 8.29 K/UL (ref 3.9–12.7)

## 2019-04-25 PROCEDURE — 85025 COMPLETE CBC W/AUTO DIFF WBC: CPT

## 2019-04-25 PROCEDURE — 80053 COMPREHEN METABOLIC PANEL: CPT

## 2019-04-25 PROCEDURE — 36415 COLL VENOUS BLD VENIPUNCTURE: CPT | Mod: PO

## 2019-04-25 PROCEDURE — 85652 RBC SED RATE AUTOMATED: CPT

## 2019-04-25 PROCEDURE — 86140 C-REACTIVE PROTEIN: CPT

## 2019-04-29 PROBLEM — Z00.00 HEALTHCARE MAINTENANCE: Status: RESOLVED | Noted: 2019-01-28 | Resolved: 2019-04-29

## 2019-05-07 DIAGNOSIS — Z12.11 SPECIAL SCREENING FOR MALIGNANT NEOPLASMS, COLON: Primary | ICD-10-CM

## 2019-05-07 RX ORDER — POLYETHYLENE GLYCOL 3350, SODIUM SULFATE ANHYDROUS, SODIUM BICARBONATE, SODIUM CHLORIDE, POTASSIUM CHLORIDE 236; 22.74; 6.74; 5.86; 2.97 G/4L; G/4L; G/4L; G/4L; G/4L
4 POWDER, FOR SOLUTION ORAL ONCE
Qty: 4000 ML | Refills: 0 | Status: SHIPPED | OUTPATIENT
Start: 2019-05-07 | End: 2019-05-07

## 2019-05-08 NOTE — PROGRESS NOTES
RHEUMATOLOGY CLINIC FOLLOW UP VISIT  Chief complaints:- follow up for arthritis       HPI:-  Darlyn Ibarra a 59 y.o. pleasant female with history of non-erosive migratory arthritis (+ and +).  Patient here today with complaints of LE (L>R) ankle pain and mild hand pain.  Patient was started on MTX 15mg Qweekly in May 2018 following findings of synovitis of the hands and ankles.      Today still complaining of bilateral feet and ankle pain (L>R).  Hand discomfort had improved with MTX but still aching.      After starting MTX, pain is better controlled.  Prior to the starting of MTX, patient was unable to walk.  Now, she is able to ambulate without assistance.  Pain is more a dull sensation but persistent and constant.   Tolerating medication well without any complications.  Compliant with all home medications.       Associated symptoms include bilateral hand and feet swelling.  AM stiffness of a couple of hours (feet/ankles).  Patient continues to feel generalized fatigue/maliase after ~1 hour of ambulation.     Interval History  Tolerating and compliant with MTX SQ and folic acid.  Does not use Mobic anymore. Significant improvement of hand swelling and pain.  Continues to have bilateral ankle and foot pain.  Mild improvement with MTX SQ.  Patient states that she has good and bad days where symptoms fluctuates.  Denies any recent trauma to the areas.      AM stiffness x 45 mins.      Denies any fever/chills, recent trauma, N/V, mouth ulcer, GI upset, diarrhea/constipation, urinary symptoms, SOB, or CP    Review of Systems   Constitutional: Negative for chills, diaphoresis, fever, malaise/fatigue and weight loss.   HENT: Negative for congestion, ear discharge, ear pain, hearing loss, nosebleeds, sinus pain and tinnitus.    Eyes: Negative for blurred vision, photophobia, pain, discharge and redness.   Respiratory: Negative for cough, hemoptysis,  "sputum production, shortness of breath, wheezing and stridor.    Cardiovascular: Negative for chest pain, palpitations, orthopnea, claudication, leg swelling and PND.   Gastrointestinal: Negative for abdominal pain, constipation, diarrhea, heartburn, nausea and vomiting.   Genitourinary: Negative for dysuria, frequency, hematuria and urgency.   Musculoskeletal: Positive for joint pain. Negative for back pain, myalgias and neck pain.   Skin: Negative for rash.   Neurological: Negative for dizziness, tingling, tremors, weakness and headaches.   Endo/Heme/Allergies: Does not bruise/bleed easily.   Psychiatric/Behavioral: Negative for depression, hallucinations and suicidal ideas. The patient is not nervous/anxious and does not have insomnia.        Past Medical History:   Diagnosis Date    Arthritis     Depression     Elevated alkaline phosphatase level     Hyperlipidemia     Osteopenia     Personal history of female infertility     Respiratory distress     " stopped Breathing" after 2 pain meds were given in a close time frame to one another.    Thyroid disease     multinodular goiter    Vitamin D deficiency disease        Past Surgical History:   Procedure Laterality Date    BREAST BIOPSY Left     over 10 yrs ago    BREAST SURGERY      benign biopsy on left    COLONOSCOPY N/A 2/19/2013    Performed by [facility]raim [facility] at [facility] ENDO    HYSTERECTOMY      HYSTERECTOMY, SUPRACERVICAL  10/28/2013    Performed by Adrian Suarez [facility] at [facility] OR    LAPAROSCOPY W/ MINI-LAPAROTOMY      for fertility issues    OOPHORECTOMY      CO REMOVAL OF OVARY/TUBE(S)      RECTAL SURGERY      excess skin growth removal    SALPINGO-OOPHORECTOMY, BILATERAL Bilateral 10/28/2013    [facility]formed by [facility] at [facility] OR    supracervical hysterectomy      TONSILLECTOMY          Social History     Tobacco Use    Smoking status: Former Smoker     Packs/day: 2.50     Years: 30.00     Pack years: 75.00    Smokeless " "tobacco: Never Used   Substance Use Topics    Alcohol use: No     Frequency: Never     Comment: Rare    Drug use: No       Family History   Problem Relation Age of Onset    Breast cancer Mother     Hyperlipidemia Mother     Emphysema Father     No Known Problems Sister     No Known Problems Brother     Macular degeneration Maternal Aunt     No Known Problems Maternal Uncle     No Known Problems Paternal Aunt     No Known Problems Paternal Uncle     No Known Problems Maternal Grandmother     No Known Problems Maternal Grandfather     No Known Problems Paternal Grandmother     No Known Problems Paternal Grandfather     Colon cancer Neg Hx     Ovarian cancer Neg Hx     Melanoma Neg Hx     Amblyopia Neg Hx     Blindness Neg Hx     Cancer Neg Hx     Cataracts Neg Hx     Diabetes Neg Hx     Hypertension Neg Hx     Retinal detachment Neg Hx     Strabismus Neg Hx     Stroke Neg Hx     Thyroid disease Neg Hx     Glaucoma Neg Hx        Review of patient's allergies indicates:   Allergen Reactions    Hydromorphone Shortness Of Breath    Percocet  [oxycodone-acetaminophen] Itching and Nausea Only    Vicodin  [hydrocodone-acetaminophen] Nausea Only and Nausea And Vomiting           Physical examination:-    Vitals:    05/09/19 1009   BP: 126/89   Pulse: 96   Weight: 69.7 kg (153 lb 10.6 oz)   Height: 5' 2" (1.575 m)   PainSc:   5       Physical Exam   Constitutional: She is oriented to person, place, and time and well-developed, well-nourished, and in no distress.   HENT:   Head: Normocephalic and atraumatic.   Eyes: Pupils are equal, round, and reactive to light. Conjunctivae are normal.   Neck: Normal range of motion. Neck supple.   Cardiovascular: Normal rate, regular rhythm and normal heart sounds.   Pulmonary/Chest: Effort normal and breath sounds normal.   Abdominal: Soft. Bowel sounds are normal.   Musculoskeletal: Normal range of motion.   L ankle swelling   +squeeze test bilaterally   No " signs of enthesis   Decrease  strength of the R hand compared with L   No swelling or signs of synovitis of the hand  ROM complete in all joints     Neurological: She is alert and oriented to person, place, and time. Gait normal.   Skin: Skin is warm and dry.   No rashes   Psychiatric: Mood, memory, affect and judgment normal.       Labs:-  Results for BRITTNEY REICH (MRN [id number]) as of 5/8/2019 11:14   Ref. Range 1/28/2019 09:20 2/18/2019 08:20 3/7/2019 08:31 3/14/2019 11:22 4/25/2019 08:51   WBC Latest Ref Range: 3.90 - 12.70 K/uL   7.84  8.29   RBC Latest Ref Range: 4.00 - 5.40 M/uL   4.27  4.33   Hemoglobin Latest Ref Range: 12.0 - 16.0 g/dL   12.9  13.1   Hematocrit Latest Ref Range: 37.0 - 48.5 %   40.1  41.0   MCV Latest Ref Range: 82 - 98 fL   94  95   MCH Latest Ref Range: 27.0 - 31.0 pg   30.2  30.3   MCHC Latest Ref Range: 32.0 - 36.0 g/dL   32.2  32.0   RDW Latest Ref Range: 11.5 - 14.5 %   13.2  13.7   Platelets Latest Ref Range: 150 - 350 K/uL   351 (H)  344   MPV Latest Ref Range: 9.2 - 12.9 fL   10.1  10.4   Gran% Latest Ref Range: 38.0 - 73.0 %   63.4  66.1   Gran # (ANC) Latest Ref Range: 1.8 - 7.7 K/uL   5.0  5.5   Lymph% Latest Ref Range: 18.0 - 48.0 %   27.2  24.8   Lymph # Latest Ref Range: 1.0 - 4.8 K/uL   2.1  2.1   Mono% Latest Ref Range: 4.0 - 15.0 %   5.9  5.7   Mono # Latest Ref Range: 0.3 - 1.0 K/uL   0.5  0.5   Eosinophil% Latest Ref Range: 0.0 - 8.0 %   2.8  2.5   Eos # Latest Ref Range: 0.0 - 0.5 K/uL   0.2  0.2   Basophil% Latest Ref Range: 0.0 - 1.9 %   0.6  0.7   Baso # Latest Ref Range: 0.00 - 0.20 K/uL   0.05  0.06   nRBC Latest Ref Range: 0 /100 WBC   0  0   Differential Method Unknown   Automated  Automated   Immature Grans (Abs) Latest Ref Range: 0.00 - 0.04 K/uL   0.01  0.02   Immature Granulocytes Latest Ref Range: 0.0 - 0.5 %   0.1  0.2   Sed Rate Latest Ref Range: 0 - 36 mm/Hr   30  27   Sodium Latest Ref Range: 136 - 145 mmol/L   142  140   Potassium Latest Ref  Range: 3.5 - 5.1 mmol/L   3.6  3.9   Chloride Latest Ref Range: 95 - 110 mmol/L   106  104   CO2 Latest Ref Range: 23 - 29 mmol/L   28  28   Anion Gap Latest Ref Range: 8 - 16 mmol/L   8  8   BUN, Bld Latest Ref Range: 6 - 20 mg/dL   9  11   Creatinine Latest Ref Range: 0.5 - 1.4 mg/dL   0.7  0.8   eGFR if non African American Latest Ref Range: >60 mL/min/1.73 m^2   >60.0  >60.0   eGFR if African American Latest Ref Range: >60 mL/min/1.73 m^2   >60.0  >60.0   Glucose Latest Ref Range: 70 - 110 mg/dL   94  94   Calcium Latest Ref Range: 8.7 - 10.5 mg/dL   9.3  9.5   Alkaline Phosphatase Latest Ref Range: 55 - 135 U/L   129  137 (H)   PROTEIN TOTAL Latest Ref Range: 6.0 - 8.4 g/dL   6.5  6.9   Albumin Latest Ref Range: 3.5 - 5.2 g/dL   3.5  3.6   BILIRUBIN TOTAL Latest Ref Range: 0.1 - 1.0 mg/dL   0.5  0.5   AST Latest Ref Range: 10 - 40 U/L   10  10   ALT Latest Ref Range: 10 - 44 U/L   15  16   CRP Latest Ref Range: 0.0 - 8.2 mg/L   7.8  9.6 (H)   Triglycerides Latest Ref Range: 30 - 150 mg/dL 161 (H)       Cholesterol Latest Ref Range: 120 - 199 mg/dL 194       HDL Latest Ref Range: 40 - 75 mg/dL 33 (L)       Hdl/Cholesterol Ratio Latest Ref Range: 20.0 - 50.0 % 17.0 (L)       LDL Cholesterol Latest Ref Range: 63.0 - 159.0 mg/dL 128.8       Non-HDL Cholesterol Latest Units: mg/dL 161       Total Cholesterol/HDL Ratio Latest Ref Range: 2.0 - 5.0  5.9 (H)       Vit D, 25-Hydroxy Latest Ref Range: 30 - 96 ng/mL 27 (L)       Hemoglobin A1C External Latest Ref Range: 4.0 - 5.6 % 5.6       Estimated Avg Glucose Latest Ref Range: 68 - 131 mg/dL 114       TSH Latest Ref Range: 0.400 - 4.000 uIU/mL 1.102       Free T4 Latest Ref Range: 0.71 - 1.51 ng/dL 1.56 (H)           Independent visualization of images done.   Xray of hand bilateral - no fracture or erosion  X ray foot - bilateral foot mild DJD  Xray ankle - normal. No fracture or erosion    Medication List with Changes/Refills   New Medications    METHYLPREDNISOLONE  "(MEDROL DOSEPACK) 4 MG TABLET    use as directed   Current Medications    ALBUTEROL (PROVENTIL HFA/VENTOLIN HFA) 200 PUFF INHALER    Inhale 2 puffs into the lungs every 4 (four) hours as needed. 2 HFA Aerosol Inhaler Inhalation Every 4-6 hours    ERGOCALCIFEROL (ERGOCALCIFEROL) 50,000 UNIT CAP    TAKE 1 CAPSULE BY MOUTH EVERY 7 DAYS.    FOLIC ACID (FOLVITE) 1 MG TABLET    Take 1 tablet (1 mg total) by mouth once daily.    LEVOTHYROXINE (SYNTHROID) 100 MCG TABLET    Take 1 tablet (100 mcg total) by mouth once daily.    MELOXICAM (MOBIC) 15 MG TABLET    TAKE 1 TABLET(15 MG) BY MOUTH EVERY DAY AS NEEDED    SYRINGE AND NEEDLE,INSULIN,1ML (INSULIN SYRINGE-NEEDLE U-100) 1 ML 29 GAUGE X 7/16" SYRG    U TO INJECT 0.6 ML ONCE A WEEK   Changed and/or Refilled Medications    Modified Medication Previous Medication    METHOTREXATE 25 MG/ML INJECTION methotrexate 25 mg/mL injection       Inject 1 mL (25 mg total) into the skin once a week. Please take 0.8mL into the skin once a week x 4 weeks and if tolerating 1mL afterwards    Inject 0.6 mLs (15 mg total) into the skin every 7 days       Assessment/Plans:-   58 y.o. pleasant female with history of non-erosive migratory arthritis (+ and +).  Patient was started on MTX 15mg Qweekly in May 2018 following findings of synovitis of the hands and ankles.    Currently complaining of bilateral ankle and feet pain that started after intense walking around.  Pain had since subsided a little since starting MTX 15mg SQ.     Improvement with hands symptoms but continues to have bilateral ankle and feet symptoms.  Previous Xray of the feet show +DJD.  Recent Xray of the ankle was unremarkable.     DAS28 CRP - 2.93     At this time, patient continues to have polyarthralgia that improved with MTX SQ.  Patient states that she benefited from Kenalog IM in the past.  Will administer Kenalog 40mg IM today and increase MTX to 0.8mL SQ with folic acid daily.  Will continue to increase " MTX to 1.0 mL until symptoms improves.  If patient does not improve with MTX SQ - will need to switch to another agent or additional therapy.       Plan  - Increase to MTX 0.8mg (20mg Qweekly) - refill provided.  Increase to 1.0mL in two week if no symptomatic improvement after 2 weeks.    - Continue folate acid daily - refill provided   - Continue Mobic PRN as needed for pain   - Continue PT - patient refuse at this time  - Kenalog 40mg IM given today   - Repeat CBC, CMP, ESR/CRP prior to the next visit            # Follow up in about 2 months (around 7/9/2019).

## 2019-05-09 ENCOUNTER — OFFICE VISIT (OUTPATIENT)
Dept: RHEUMATOLOGY | Facility: CLINIC | Age: 59
End: 2019-05-09
Payer: COMMERCIAL

## 2019-05-09 VITALS
BODY MASS INDEX: 28.28 KG/M2 | DIASTOLIC BLOOD PRESSURE: 89 MMHG | SYSTOLIC BLOOD PRESSURE: 126 MMHG | HEIGHT: 62 IN | HEART RATE: 96 BPM | WEIGHT: 153.69 LBS

## 2019-05-09 DIAGNOSIS — M05.79 RHEUMATOID ARTHRITIS INVOLVING MULTIPLE SITES WITH POSITIVE RHEUMATOID FACTOR: ICD-10-CM

## 2019-05-09 PROCEDURE — 99214 PR OFFICE/OUTPT VISIT, EST, LEVL IV, 30-39 MIN: ICD-10-PCS | Mod: S$GLB,,, | Performed by: STUDENT IN AN ORGANIZED HEALTH CARE EDUCATION/TRAINING PROGRAM

## 2019-05-09 PROCEDURE — 3074F PR MOST RECENT SYSTOLIC BLOOD PRESSURE < 130 MM HG: ICD-10-PCS | Mod: CPTII,S$GLB,, | Performed by: STUDENT IN AN ORGANIZED HEALTH CARE EDUCATION/TRAINING PROGRAM

## 2019-05-09 PROCEDURE — 99214 OFFICE O/P EST MOD 30 MIN: CPT | Mod: S$GLB,,, | Performed by: STUDENT IN AN ORGANIZED HEALTH CARE EDUCATION/TRAINING PROGRAM

## 2019-05-09 PROCEDURE — 3008F BODY MASS INDEX DOCD: CPT | Mod: CPTII,S$GLB,, | Performed by: STUDENT IN AN ORGANIZED HEALTH CARE EDUCATION/TRAINING PROGRAM

## 2019-05-09 PROCEDURE — 3079F DIAST BP 80-89 MM HG: CPT | Mod: CPTII,S$GLB,, | Performed by: STUDENT IN AN ORGANIZED HEALTH CARE EDUCATION/TRAINING PROGRAM

## 2019-05-09 PROCEDURE — 3074F SYST BP LT 130 MM HG: CPT | Mod: CPTII,S$GLB,, | Performed by: STUDENT IN AN ORGANIZED HEALTH CARE EDUCATION/TRAINING PROGRAM

## 2019-05-09 PROCEDURE — 99999 PR PBB SHADOW E&M-EST. PATIENT-LVL III: ICD-10-PCS | Mod: PBBFAC,,, | Performed by: STUDENT IN AN ORGANIZED HEALTH CARE EDUCATION/TRAINING PROGRAM

## 2019-05-09 PROCEDURE — 3079F PR MOST RECENT DIASTOLIC BLOOD PRESSURE 80-89 MM HG: ICD-10-PCS | Mod: CPTII,S$GLB,, | Performed by: STUDENT IN AN ORGANIZED HEALTH CARE EDUCATION/TRAINING PROGRAM

## 2019-05-09 PROCEDURE — 99999 PR PBB SHADOW E&M-EST. PATIENT-LVL III: CPT | Mod: PBBFAC,,, | Performed by: STUDENT IN AN ORGANIZED HEALTH CARE EDUCATION/TRAINING PROGRAM

## 2019-05-09 PROCEDURE — 96372 THER/PROPH/DIAG INJ SC/IM: CPT | Mod: S$GLB,,, | Performed by: INTERNAL MEDICINE

## 2019-05-09 PROCEDURE — 3008F PR BODY MASS INDEX (BMI) DOCUMENTED: ICD-10-PCS | Mod: CPTII,S$GLB,, | Performed by: STUDENT IN AN ORGANIZED HEALTH CARE EDUCATION/TRAINING PROGRAM

## 2019-05-09 PROCEDURE — 96372 PR INJECTION,THERAP/PROPH/DIAG2ST, IM OR SUBCUT: ICD-10-PCS | Mod: S$GLB,,, | Performed by: INTERNAL MEDICINE

## 2019-05-09 RX ORDER — METHYLPREDNISOLONE 4 MG/1
TABLET ORAL
Qty: 1 PACKAGE | Refills: 0 | Status: SHIPPED | OUTPATIENT
Start: 2019-05-09 | End: 2019-05-30

## 2019-05-09 RX ORDER — TRIAMCINOLONE ACETONIDE 40 MG/ML
40 INJECTION, SUSPENSION INTRA-ARTICULAR; INTRAMUSCULAR ONCE
Status: COMPLETED | OUTPATIENT
Start: 2019-05-09 | End: 2019-05-09

## 2019-05-09 RX ORDER — METHOTREXATE 25 MG/ML
25 INJECTION, SOLUTION INTRA-ARTERIAL; INTRAMUSCULAR; INTRAVENOUS WEEKLY
Qty: 4 ML | Refills: 3 | Status: SHIPPED | OUTPATIENT
Start: 2019-05-09 | End: 2019-08-21 | Stop reason: SDUPTHER

## 2019-05-09 RX ADMIN — TRIAMCINOLONE ACETONIDE 40 MG: 40 INJECTION, SUSPENSION INTRA-ARTICULAR; INTRAMUSCULAR at 11:05

## 2019-05-09 ASSESSMENT — DISEASE ACTIVITY SCORE (DAS28)
GLOBAL_HEALTH_SCORE: 80
TENDER_JOINTS_COUNT: 0
SWOLLEN_JOINTS_COUNT: 0
CRP_MG_PER_LITER: 9.6
TOTAL_SCORE_CRP: 2.93

## 2019-05-09 ASSESSMENT — ROUTINE ASSESSMENT OF PATIENT INDEX DATA (RAPID3)
MDHAQ FUNCTION SCORE: .2
FATIGUE SCORE: 5
PSYCHOLOGICAL DISTRESS SCORE: 3.3
PAIN SCORE: 5
PATIENT GLOBAL ASSESSMENT SCORE: 8
AM STIFFNESS SCORE: 1, YES
TOTAL RAPID3 SCORE: 4.56

## 2019-05-09 NOTE — PROGRESS NOTES
I agree with the documentation by     58 year old female with non-erosive RA  Ongoing ankle/feet symptoms  Suggested titrating mtx to 1 ml after she takes 0.8 ml weekly   Steroid shot today  Labs     rtc in 8 weeks

## 2019-05-09 NOTE — PATIENT INSTRUCTIONS
For Methotrexate  - Please 0.8mL into the skin every week for 4 weeks.  Please update if on how you are doing.  If you are still having pain, please go up to 1mL afterwards.    Labs to be done in 2 months

## 2019-05-10 DIAGNOSIS — E55.9 VITAMIN D DEFICIENCY DISEASE: ICD-10-CM

## 2019-05-15 RX ORDER — ERGOCALCIFEROL 1.25 MG/1
CAPSULE ORAL
Qty: 12 CAPSULE | Refills: 0 | Status: SHIPPED | OUTPATIENT
Start: 2019-05-15 | End: 2019-05-22 | Stop reason: SDUPTHER

## 2019-05-22 DIAGNOSIS — E03.9 HYPOTHYROIDISM, UNSPECIFIED TYPE: ICD-10-CM

## 2019-05-22 DIAGNOSIS — E55.9 VITAMIN D DEFICIENCY DISEASE: ICD-10-CM

## 2019-05-22 RX ORDER — POLYETHYLENE GLYCOL-3350 AND ELECTROLYTES 236; 6.74; 5.86; 2.97; 22.74 G/274.31G; G/274.31G; G/274.31G; G/274.31G; G/274.31G
POWDER, FOR SOLUTION ORAL
Refills: 0 | Status: ON HOLD | COMMUNITY
Start: 2019-05-07 | End: 2019-06-07 | Stop reason: HOSPADM

## 2019-05-22 NOTE — TELEPHONE ENCOUNTER
----- Message from Kathryn Joshi sent at 5/22/2019 12:47 PM CDT -----  Contact: Self  Type: RX Refill Request    Who Called: Darlyn    Refill or New Rx:refill    RX Name and Strength: levothyroxine (non-generic)(SYNTHROID) 100 MCG tablet 30 tablet   ergocalciferol (ERGOCALCIFEROL) 50,000 unit Cap 12 capsule    tiZANidine (ZANAFLEX) 2 MG tablet 30 tablet     How is the patient currently taking it? (ex. 1XDay):    Is this a 30 day or 90 day RX:    Preferred Pharmacy with phone number:[facility] [address] [place] [address] [place] AT [facility] [phone] (Phone)  [phone] (Fax)    Local or Mail Order:Local    Ordering Provider:Alo    Would the patient rather a call back or a response via My [facility]? Call    Best Call Back Number: [phone]    Additional Information: patient requesting medication refill

## 2019-05-23 RX ORDER — ERGOCALCIFEROL 1.25 MG/1
50000 CAPSULE ORAL
Qty: 12 CAPSULE | Refills: 0 | Status: SHIPPED | OUTPATIENT
Start: 2019-05-23 | End: 2019-10-30 | Stop reason: SDUPTHER

## 2019-05-23 RX ORDER — LEVOTHYROXINE SODIUM 100 UG/1
100 TABLET ORAL DAILY
Qty: 30 TABLET | Refills: 11 | Status: SHIPPED | OUTPATIENT
Start: 2019-05-23 | End: 2019-08-12 | Stop reason: SDUPTHER

## 2019-06-03 ENCOUNTER — TELEPHONE (OUTPATIENT)
Dept: FAMILY MEDICINE | Facility: CLINIC | Age: 59
End: 2019-06-03

## 2019-06-03 NOTE — TELEPHONE ENCOUNTER
----- Message from Jocelin Bright sent at 6/3/2019 12:23 PM CDT -----  Contact: Self   Type: RX Refill Request    Who Called: self     Refill or New Rx: refill     RX Name and Strength:tiZANidine (ZANAFLEX) 2 MG tablet 30 tablet     How is the patient currently taking it? (ex. 1XDay):     Is this a 30 day or 90 day RX: 90    Preferred Pharmacy with phone number: [facility] [address] [place] [address] [place] AT [facility] [phone] (Phone)  [phone] (Fax)        Local or Mail Order:local   Ordering Provider: ZULEYKA    Would the patient rather a call back or a response via [facility]? Call     Best Call Back Number: [phone]    Additional Information:

## 2019-06-07 ENCOUNTER — ANESTHESIA (OUTPATIENT)
Dept: ENDOSCOPY | Facility: HOSPITAL | Age: 59
End: 2019-06-07
Payer: COMMERCIAL

## 2019-06-07 ENCOUNTER — ANESTHESIA EVENT (OUTPATIENT)
Dept: ENDOSCOPY | Facility: HOSPITAL | Age: 59
End: 2019-06-07
Payer: COMMERCIAL

## 2019-06-07 ENCOUNTER — HOSPITAL ENCOUNTER (OUTPATIENT)
Facility: HOSPITAL | Age: 59
Discharge: HOME | End: 2019-06-07
Attending: INTERNAL MEDICINE | Admitting: INTERNAL MEDICINE
Payer: COMMERCIAL

## 2019-06-07 VITALS
TEMPERATURE: 98 F | HEIGHT: 62 IN | WEIGHT: 150 LBS | BODY MASS INDEX: 27.6 KG/M2 | HEART RATE: 69 BPM | OXYGEN SATURATION: 95 % | SYSTOLIC BLOOD PRESSURE: 97 MMHG | RESPIRATION RATE: 35 BRPM | DIASTOLIC BLOOD PRESSURE: 74 MMHG

## 2019-06-07 DIAGNOSIS — D37.4 POLYP OF COLON, VILLOUS ADENOMA: Primary | ICD-10-CM

## 2019-06-07 DIAGNOSIS — K63.5 COLON POLYPS: ICD-10-CM

## 2019-06-07 PROCEDURE — G0105 COLORECTAL SCRN; HI RISK IND: ICD-10-PCS | Mod: ,,, | Performed by: INTERNAL MEDICINE

## 2019-06-07 PROCEDURE — 25000003 PHARM REV CODE 250: Performed by: INTERNAL MEDICINE

## 2019-06-07 PROCEDURE — G0105 COLORECTAL SCRN; HI RISK IND: HCPCS | Performed by: INTERNAL MEDICINE

## 2019-06-07 PROCEDURE — 37000009 HC ANESTHESIA EA ADD 15 MINS: Performed by: INTERNAL MEDICINE

## 2019-06-07 PROCEDURE — G0105 COLORECTAL SCRN; HI RISK IND: HCPCS | Mod: ,,, | Performed by: INTERNAL MEDICINE

## 2019-06-07 PROCEDURE — D9220A PRA ANESTHESIA: ICD-10-PCS | Mod: ANES,,, | Performed by: ANESTHESIOLOGY

## 2019-06-07 PROCEDURE — 37000008 HC ANESTHESIA 1ST 15 MINUTES: Performed by: INTERNAL MEDICINE

## 2019-06-07 PROCEDURE — D9220A PRA ANESTHESIA: ICD-10-PCS | Mod: CRNA,,, | Performed by: NURSE ANESTHETIST, CERTIFIED REGISTERED

## 2019-06-07 PROCEDURE — D9220A PRA ANESTHESIA: Mod: CRNA,,, | Performed by: NURSE ANESTHETIST, CERTIFIED REGISTERED

## 2019-06-07 PROCEDURE — 63600175 PHARM REV CODE 636 W HCPCS: Performed by: NURSE ANESTHETIST, CERTIFIED REGISTERED

## 2019-06-07 PROCEDURE — 25000003 PHARM REV CODE 250: Performed by: NURSE ANESTHETIST, CERTIFIED REGISTERED

## 2019-06-07 PROCEDURE — D9220A PRA ANESTHESIA: Mod: ANES,,, | Performed by: ANESTHESIOLOGY

## 2019-06-07 RX ORDER — SODIUM CHLORIDE 0.9 % (FLUSH) 0.9 %
3 SYRINGE (ML) INJECTION
Status: DISCONTINUED | OUTPATIENT
Start: 2019-06-07 | End: 2019-06-07 | Stop reason: HOSPADM

## 2019-06-07 RX ORDER — FENTANYL CITRATE 50 UG/ML
INJECTION, SOLUTION INTRAMUSCULAR; INTRAVENOUS
Status: DISCONTINUED | OUTPATIENT
Start: 2019-06-07 | End: 2019-06-07

## 2019-06-07 RX ORDER — PROPOFOL 10 MG/ML
VIAL (ML) INTRAVENOUS CONTINUOUS PRN
Status: DISCONTINUED | OUTPATIENT
Start: 2019-06-07 | End: 2019-06-07

## 2019-06-07 RX ORDER — SODIUM CHLORIDE 9 MG/ML
INJECTION, SOLUTION INTRAVENOUS CONTINUOUS
Status: DISCONTINUED | OUTPATIENT
Start: 2019-06-07 | End: 2019-06-07 | Stop reason: HOSPADM

## 2019-06-07 RX ORDER — PROPOFOL 10 MG/ML
VIAL (ML) INTRAVENOUS
Status: DISCONTINUED | OUTPATIENT
Start: 2019-06-07 | End: 2019-06-07

## 2019-06-07 RX ORDER — SODIUM CHLORIDE 9 MG/ML
INJECTION, SOLUTION INTRAVENOUS CONTINUOUS PRN
Status: DISCONTINUED | OUTPATIENT
Start: 2019-06-07 | End: 2019-06-07

## 2019-06-07 RX ORDER — ONDANSETRON 2 MG/ML
INJECTION INTRAMUSCULAR; INTRAVENOUS
Status: DISCONTINUED | OUTPATIENT
Start: 2019-06-07 | End: 2019-06-07

## 2019-06-07 RX ORDER — LIDOCAINE HCL/PF 100 MG/5ML
SYRINGE (ML) INTRAVENOUS
Status: DISCONTINUED | OUTPATIENT
Start: 2019-06-07 | End: 2019-06-07

## 2019-06-07 RX ADMIN — FENTANYL CITRATE 25 MCG: 50 INJECTION, SOLUTION INTRAMUSCULAR; INTRAVENOUS at 08:06

## 2019-06-07 RX ADMIN — PROPOFOL 150 MCG/KG/MIN: 10 INJECTION, EMULSION INTRAVENOUS at 08:06

## 2019-06-07 RX ADMIN — LIDOCAINE HYDROCHLORIDE 30 MG: 20 INJECTION, SOLUTION INTRAVENOUS at 08:06

## 2019-06-07 RX ADMIN — PROPOFOL 30 MG: 10 INJECTION, EMULSION INTRAVENOUS at 08:06

## 2019-06-07 RX ADMIN — PROPOFOL 50 MG: 10 INJECTION, EMULSION INTRAVENOUS at 08:06

## 2019-06-07 RX ADMIN — SODIUM CHLORIDE: 0.9 INJECTION, SOLUTION INTRAVENOUS at 07:06

## 2019-06-07 RX ADMIN — ONDANSETRON 4 MG: 2 INJECTION INTRAMUSCULAR; INTRAVENOUS at 08:06

## 2019-06-07 NOTE — TRANSFER OF CARE
"Anesthesia Transfer of Care Note    Patient: Darlyn Donato    Procedure(s) Performed: Procedure(s) (LRB):  COLONOSCOPY (N/A)    Patient location: [facility]    Anesthesia Type: general    Transport from OR: Transported from OR on 6-10 L/min O2 by face mask with adequate spontaneous ventilation    Post pain: adequate analgesia    Post assessment: no apparent anesthetic complications and tolerated procedure well    Post vital signs: stable    Level of consciousness: responds to stimulation    Nausea/Vomiting: no nausea/vomiting    Complications: none    Transfer of care protocol was followed      Last vitals:   Visit Vitals  BP (!) 91/54   Pulse 89   Temp 36.4 °C (97.5 °F) (Temporal)   Resp 11   Ht 5' 2" (1.575 m)   Wt 68 kg (150 lb)   SpO2 98%   Breastfeeding? No   BMI 27.44 kg/m²     "

## 2019-06-07 NOTE — [employer] PATIENT INSTRUCTIONS
Discharge Summary/Instructions after an Endoscopic Procedure  Patient Name: Darlyn Donato  Patient MRN: [id number]  Patient YOB: 1960  Friday, June 07, 2019  Julian Waldrop MD  RESTRICTIONS:  During your procedure today, you received medications for sedation.  These   medications may affect your judgment, balance and coordination.  Therefore,   for 24 hours, you have the following restrictions:   - DO NOT drive a car, operate machinery, make legal/financial decisions,   sign important papers or drink alcohol.    ACTIVITY:  Today: no heavy lifting, straining or running due to procedural   sedation/anesthesia.  The following day: return to full activity including work.  DIET:  Eat and drink normally unless instructed otherwise.     TREATMENT FOR COMMON SIDE EFFECTS:  - Mild abdominal pain, nausea, belching, bloating or excessive gas:  rest,   eat lightly and use a heating pad.  - Sore Throat: treat with throat lozenges and/or gargle with warm salt   water.  - Because air was used during the procedure, expelling large amounts of air   from your rectum or belching is normal.  - If a bowel prep was taken, you may not have a bowel movement for 1-3 days.    This is normal.  SYMPTOMS TO WATCH FOR AND REPORT TO YOUR PHYSICIAN:  1. Abdominal pain or bloating, other than gas cramps.  2. Chest pain.  3. Back pain.  4. Signs of infection such as: chills or fever occurring within 24 hours   after the procedure.  5. Rectal bleeding, which would show as bright red, maroon, or black stools.   (A tablespoon of blood from the rectum is not serious, especially if   hemorrhoids are present.)  6. Vomiting.  7. Weakness or dizziness.  GO DIRECTLY TO THE NEAREST EMERGENCY ROOM IF YOU HAVE ANY OF THE FOLLOWING:      Difficulty breathing              Chills and/or fever over 101 F   Persistent vomiting and/or vomiting blood   Severe abdominal pain   Severe chest pain   Black, tarry stools   Bleeding- more than one tablespoon   Any other  symptom or condition that you feel may need urgent attention  Your doctor recommends these additional instructions:  If any biopsies were taken, your doctors clinic will contact you in 1 to 2   weeks with any results.  - Patient has a contact number available for emergencies.  The signs and   symptoms of potential delayed complications were discussed with the   patient.  Return to normal activities tomorrow.  Written discharge   instructions were provided to the patient.   - Discharge patient to home (ambulatory).   - Resume previous diet.   - Repeat colonoscopy in 2 years for surveillance given potential polyp.   - The findings and recommendations were discussed with the patient.   For questions, problems or results please call your physician - Julian Waldrop MD at Work:  [phone].  [facility], EMERGENCY ROOM PHONE NUMBER: [phone]  IF A COMPLICATION OR EMERGENCY SITUATION ARISES AND YOU ARE UNABLE TO REACH   YOUR PHYSICIAN - GO DIRECTLY TO THE EMERGENCY ROOM.  Julian Waldrop MD  6/7/2019 9:08:11 AM  This report has been verified and signed electronically.  [employer]

## 2019-06-07 NOTE — ANESTHESIA POSTPROCEDURE EVALUATION
Anesthesia Post Evaluation    Patient: Darlyn Donato    Procedure(s) Performed: Procedure(s) (LRB):  COLONOSCOPY (N/A)    Final Anesthesia Type: general  Patient location during evaluation: GI PACU  Patient participation: Yes- Able to Participate  Level of consciousness: awake and alert  Post-procedure vital signs: reviewed and stable  Pain management: adequate  Airway patency: patent  PONV status at discharge: No PONV  Anesthetic complications: no      Cardiovascular status: blood pressure returned to baseline  Respiratory status: spontaneous ventilation  Hydration status: euvolemic  Follow-up not needed.          Vitals Value Taken Time   BP 97/74 6/7/2019  9:48 AM   Temp 36.4 °C (97.5 °F) 6/7/2019  8:57 AM   Pulse 68 6/7/2019  9:47 AM   Resp 38 6/7/2019  9:47 AM   SpO2 97 % 6/7/2019  9:46 AM   Vitals shown include unvalidated device data.      No case tracking events are documented in the log.      Pain/Anisha Score: Anisha Score: 10 (6/7/2019 10:15 AM)

## 2019-06-07 NOTE — PLAN OF CARE
Patient tolerated procedure and VSS.  Dr. Waldrop at bedside and spoke with patient and spouse.  Patient denied pain and nausea and tolerating PO.   Patient given discharge instructions and verbalizes understanding of all instructions.  Patient discharged and escorted to car via WC to .

## 2019-06-07 NOTE — H&P
Short Stay Endoscopy History and Physical    PCP - Amando Prajapati MD  Referring Physician - Amando Prajapati MD  [address] [place] [address]    Procedure - Colonoscopy  ASA - per anesthesia  Mallampati - per anesthesia  History of Anesthesia problems - no  Family history Anesthesia problems -  no   Plan of anesthesia - General    HPI  59 y.o. female with past history of RA and hypothyroidism, history of previous polyps in 2013 (15mm and 5mm; both descending colon TA with focal villous features), here for surveillance colonoscopy    Reason for procedure: Surveillance colonosocpy    ROS:  Constitutional: No fevers, chills, No weight loss  CV: No chest pain  Pulm: No cough, No shortness of breath  GI: see HPI    Medical History:  has a past medical history of Arthritis, Depression, Elevated alkaline phosphatase level, Hyperlipidemia, Osteopenia, Personal history of female infertility, Respiratory distress, Thyroid disease, and Vitamin D deficiency disease.    Surgical History:  has a past surgical history that includes Tonsillectomy; Breast surgery; Laparoscopy w/ mini-laparotomy; Rectal surgery; supracervical hysterectomy; pr removal of ovary/tube(s); Hysterectomy; Oophorectomy; and Breast biopsy (Left).    Family History: family history includes Breast cancer in her mother; Emphysema in her father; Hyperlipidemia in her mother; Macular degeneration in her maternal aunt; No Known Problems in her brother, maternal grandfather, maternal grandmother, maternal uncle, paternal aunt, paternal grandfather, paternal grandmother, paternal uncle, and sister., see HPI    Social History:  reports that she has quit smoking. She has a 75.00 pack-year smoking history. She has never used smokeless tobacco. She reports that she does not drink alcohol or use drugs.    Review of patient's allergies indicates:   Allergen Reactions    Hydromorphone Shortness Of Breath    Percocet  [oxycodone-acetaminophen] Itching and  "Nausea Only    Vicodin  [hydrocodone-acetaminophen] Nausea Only and Nausea And Vomiting       Medications:   Medications Prior to Admission   Medication Sig Dispense Refill Last Dose    albuterol (PROVENTIL HFA/VENTOLIN HFA) 200 puff inhaler Inhale 2 puffs into the lungs every 4 (four) hours as needed. 2 HFA Aerosol Inhaler Inhalation Every 4-6 hours   Past Month at Unknown time    ergocalciferol (ERGOCALCIFEROL) 50,000 unit Cap Take 1 capsule (50,000 Units total) by mouth every 7 days. 12 capsule 0 6/6/2019 at Unknown time    folic acid (FOLVITE) 1 MG tablet Take 1 tablet (1 mg total) by mouth once daily. 30 tablet 11 6/6/2019 at Unknown time    levothyroxine (SYNTHROID) 100 MCG tablet Take 1 tablet (100 mcg total) by mouth once daily. 30 tablet 11 6/6/2019 at Unknown time    methotrexate 25 mg/mL injection Please take 0.8mL into the skin once a week x 4 weeks and if tolerating 1mL afterwards 4 mL 3 Past Week at Unknown time    syringe and needle,insulin,1mL (INSULIN SYRINGE-NEEDLE U-100) 1 mL 29 gauge x 7/16" Syrg U TO INJECT 0.6 ML ONCE A WEEK  0 Past Week at Unknown time    GAVILYTE-G 236-22.74-6.74 -5.86 gram suspension MIX AND DRINK UTD  0     meloxicam (MOBIC) 15 MG tablet TAKE 1 TABLET(15 MG) BY MOUTH EVERY DAY AS NEEDED 30 tablet 2 Not Taking       Physical Exam:    Vital Signs:   Vitals:    06/07/19 0718   BP: 124/69   Pulse: 69   Resp: 16   Temp: 98.4 °F (36.9 °C)       General Appearance: Well appearing in no acute distress  Abdomen: Soft, non tender, non distended with normal bowel sounds, no masses    Labs:  Lab Results   Component Value Date    WBC 8.29 04/25/2019    HGB 13.1 04/25/2019    HCT 41.0 04/25/2019     04/25/2019    CHOL 194 01/28/2019    TRIG 161 (H) 01/28/2019    HDL 33 (L) 01/28/2019    ALT 16 04/25/2019    AST 10 04/25/2019     04/25/2019    K 3.9 04/25/2019     04/25/2019    CREATININE 0.8 04/25/2019    BUN 11 04/25/2019    CO2 28 04/25/2019    TSH 1.102 " 01/28/2019    INR 0.9 10/22/2013    HGBA1C 5.6 01/28/2019       I have explained the risks and benefits of this endoscopic procedure to the patient including but not limited to bleeding, inflammation, infection, perforation, and death.      Roshni Johnson MD

## 2019-06-07 NOTE — ANESTHESIA PREPROCEDURE EVALUATION
"                                                                                                             06/07/2019  Darlyn Donato is a 59 y.o., female here for colonoscopy.      Past Medical History:   Diagnosis Date    Arthritis     Depression     Elevated alkaline phosphatase level     Hyperlipidemia     Osteopenia     Personal history of female infertility     Respiratory distress     " stopped Breathing" after 2 pain meds were given in a close time frame to one another.    Thyroid disease     multinodular goiter    Vitamin D deficiency disease          Pre-op Assessment    I have reviewed the Patient Summary Reports.     I have reviewed the Nursing Notes.   I have reviewed the Medications.     Review of Systems  Anesthesia Hx:  No problems with previous Anesthesia  History of prior surgery of interest to airway management or planning: Previous anesthesia: General Denies Family Hx of Anesthesia complications.   Denies Personal Hx of Anesthesia complications.   Social:  Smoker Quit 5 years ago   Hematology/Oncology:  Hematology Normal   Oncology Normal     EENT/Dental:   Capped top left incisor   Cardiovascular:   Hyperlipidemia   Pulmonary:   Former smoker   Renal/:   Chronic Renal Disease Nephrolithiasis   Hepatic/GI:  Hepatic/GI Normal    Musculoskeletal:  Musculoskeletal Normal    Neurological:  Neurology Normal    Endocrine:   Hypothyroidism    Dermatological:  Skin Normal    Psych:   Psychiatric History          Physical Exam  General:  Well nourished, Obesity    Airway/Jaw/Neck:  Airway Findings: Mouth Opening: Small, but > 3cm Tongue: Normal  General Airway Assessment: Adult, Average, Possible difficult intubation  Mallampati: III  Improves to II with phonation.  TM Distance: <4 cm  Jaw/Neck Findings:  Micrognathia     Eyes/Ears/Nose:  EYES/EARS/NOSE FINDINGS: Normal   Dental:  Dental Findings: upper front caps   Chest/Lungs:  Chest/Lungs Findings: Clear to auscultation, Normal Respiratory " Rate     Heart/Vascular:  Heart Findings: Rate: Normal  Rhythm: Regular Rhythm  Sounds: Normal  Heart murmur: negative Vascular Findings: Normal    Abdomen:  Abdomen Findings: Normal    Musculoskeletal:  Musculoskeletal Findings: Normal   Skin:  Skin Findings: Normal    Mental Status:  Mental Status Findings: Normal        Anesthesia Plan  Type of Anesthesia, risks & benefits discussed:  Anesthesia Type:  general  Patient's Preference:   Intra-op Monitoring Plan:   Intra-op Monitoring Plan Comments:   Post Op Pain Control Plan:   Post Op Pain Control Plan Comments:   Induction:    Beta Blocker:         Informed Consent: Patient understands risks and agrees with Anesthesia plan.  Questions answered. Anesthesia consent signed with patient.  ASA Score: 2     Day of Surgery Review of History & Physical:    H&P update referred to the surgeon.         Ready For Surgery From Anesthesia Perspective.

## 2019-06-11 DIAGNOSIS — M79.10 MUSCLE PAIN: Primary | ICD-10-CM

## 2019-06-11 RX ORDER — TIZANIDINE 2 MG/1
2 TABLET ORAL EVERY 12 HOURS PRN
Qty: 30 TABLET | Refills: 1 | Status: SHIPPED | OUTPATIENT
Start: 2019-06-11 | End: 2019-06-21

## 2019-06-12 ENCOUNTER — PATIENT MESSAGE (OUTPATIENT)
Dept: PHARMACY | Facility: CLINIC | Age: 59
End: 2019-06-12

## 2019-06-12 ENCOUNTER — TELEPHONE (OUTPATIENT)
Dept: PHARMACY | Facility: CLINIC | Age: 59
End: 2019-06-12

## 2019-06-12 NOTE — TELEPHONE ENCOUNTER
Spoke with patient regarding MTX injection. She says she has been taking 0.8mL every  since , so has been 5 doses now. She says she is doing very well, no pain to report except her feet after walking a lot last weekend that resolved w/some rest. We reviewed MD recommendation to increase to 1mL if still having pain. She says she would like to stay at the 0.8 mL for one more month and see how she is doing before potentially increasing to 1 mL since she is not really having any RA pain. Has not had to take her Mobic. She has no side effects to report w/higher dose. Will check in w/patient again at next refill. Shipment set for  for pt to receive . $6.57 copay (004). Address and  verified.

## 2019-07-05 ENCOUNTER — LAB VISIT (OUTPATIENT)
Dept: LAB | Facility: HOSPITAL | Age: 59
End: 2019-07-05
Attending: STUDENT IN AN ORGANIZED HEALTH CARE EDUCATION/TRAINING PROGRAM
Payer: COMMERCIAL

## 2019-07-05 DIAGNOSIS — M19.90 OSTEOARTHRITIS, UNSPECIFIED OSTEOARTHRITIS TYPE, UNSPECIFIED SITE: ICD-10-CM

## 2019-07-05 DIAGNOSIS — Z79.60 LONG-TERM USE OF IMMUNOSUPPRESSANT MEDICATION: ICD-10-CM

## 2019-07-05 DIAGNOSIS — M05.79 RHEUMATOID ARTHRITIS INVOLVING MULTIPLE SITES WITH POSITIVE RHEUMATOID FACTOR: ICD-10-CM

## 2019-07-05 LAB
ALBUMIN SERPL BCP-MCNC: 3.8 G/DL (ref 3.5–5.2)
ALP SERPL-CCNC: 126 U/L (ref 55–135)
ALT SERPL W/O P-5'-P-CCNC: 23 U/L (ref 10–44)
ANION GAP SERPL CALC-SCNC: 8 MMOL/L (ref 8–16)
AST SERPL-CCNC: 12 U/L (ref 10–40)
BASOPHILS # BLD AUTO: 0.04 K/UL (ref 0–0.2)
BASOPHILS NFR BLD: 0.4 % (ref 0–1.9)
BILIRUB SERPL-MCNC: 0.4 MG/DL (ref 0.1–1)
BUN SERPL-MCNC: 9 MG/DL (ref 6–20)
CALCIUM SERPL-MCNC: 9.8 MG/DL (ref 8.7–10.5)
CHLORIDE SERPL-SCNC: 105 MMOL/L (ref 95–110)
CO2 SERPL-SCNC: 28 MMOL/L (ref 23–29)
CREAT SERPL-MCNC: 0.8 MG/DL (ref 0.5–1.4)
CRP SERPL-MCNC: 6 MG/L (ref 0–8.2)
DIFFERENTIAL METHOD: ABNORMAL
EOSINOPHIL # BLD AUTO: 0.2 K/UL (ref 0–0.5)
EOSINOPHIL NFR BLD: 2.5 % (ref 0–8)
ERYTHROCYTE [DISTWIDTH] IN BLOOD BY AUTOMATED COUNT: 14.4 % (ref 11.5–14.5)
ERYTHROCYTE [SEDIMENTATION RATE] IN BLOOD BY WESTERGREN METHOD: 23 MM/HR (ref 0–36)
EST. GFR  (AFRICAN AMERICAN): >60 ML/MIN/1.73 M^2
EST. GFR  (NON AFRICAN AMERICAN): >60 ML/MIN/1.73 M^2
GLUCOSE SERPL-MCNC: 95 MG/DL (ref 70–110)
HCT VFR BLD AUTO: 42.5 % (ref 37–48.5)
HGB BLD-MCNC: 13.3 G/DL (ref 12–16)
IMM GRANULOCYTES # BLD AUTO: 0.03 K/UL (ref 0–0.04)
IMM GRANULOCYTES NFR BLD AUTO: 0.3 % (ref 0–0.5)
LYMPHOCYTES # BLD AUTO: 2.1 K/UL (ref 1–4.8)
LYMPHOCYTES NFR BLD: 22.6 % (ref 18–48)
MCH RBC QN AUTO: 30.9 PG (ref 27–31)
MCHC RBC AUTO-ENTMCNC: 31.3 G/DL (ref 32–36)
MCV RBC AUTO: 99 FL (ref 82–98)
MONOCYTES # BLD AUTO: 0.4 K/UL (ref 0.3–1)
MONOCYTES NFR BLD: 4.7 % (ref 4–15)
NEUTROPHILS # BLD AUTO: 6.3 K/UL (ref 1.8–7.7)
NEUTROPHILS NFR BLD: 69.5 % (ref 38–73)
NRBC BLD-RTO: 0 /100 WBC
PLATELET # BLD AUTO: 348 K/UL (ref 150–350)
PMV BLD AUTO: 10.5 FL (ref 9.2–12.9)
POTASSIUM SERPL-SCNC: 3.9 MMOL/L (ref 3.5–5.1)
PROT SERPL-MCNC: 6.9 G/DL (ref 6–8.4)
RBC # BLD AUTO: 4.3 M/UL (ref 4–5.4)
SODIUM SERPL-SCNC: 141 MMOL/L (ref 136–145)
WBC # BLD AUTO: 9.12 K/UL (ref 3.9–12.7)

## 2019-07-05 PROCEDURE — 86140 C-REACTIVE PROTEIN: CPT

## 2019-07-05 PROCEDURE — 85025 COMPLETE CBC W/AUTO DIFF WBC: CPT

## 2019-07-05 PROCEDURE — 85652 RBC SED RATE AUTOMATED: CPT

## 2019-07-05 PROCEDURE — 36415 COLL VENOUS BLD VENIPUNCTURE: CPT | Mod: PO

## 2019-07-05 PROCEDURE — 80053 COMPREHEN METABOLIC PANEL: CPT

## 2019-07-10 ENCOUNTER — TELEPHONE (OUTPATIENT)
Dept: RHEUMATOLOGY | Facility: CLINIC | Age: 59
End: 2019-07-10

## 2019-08-05 ENCOUNTER — TELEPHONE (OUTPATIENT)
Dept: PHARMACY | Facility: CLINIC | Age: 59
End: 2019-08-05

## 2019-08-07 ENCOUNTER — PATIENT MESSAGE (OUTPATIENT)
Dept: RHEUMATOLOGY | Facility: CLINIC | Age: 59
End: 2019-08-07

## 2019-08-11 ENCOUNTER — PATIENT MESSAGE (OUTPATIENT)
Dept: FAMILY MEDICINE | Facility: CLINIC | Age: 59
End: 2019-08-11

## 2019-08-12 ENCOUNTER — TELEPHONE (OUTPATIENT)
Dept: FAMILY MEDICINE | Facility: CLINIC | Age: 59
End: 2019-08-12

## 2019-08-12 DIAGNOSIS — E03.9 HYPOTHYROIDISM, UNSPECIFIED TYPE: ICD-10-CM

## 2019-08-12 RX ORDER — LEVOTHYROXINE SODIUM 100 UG/1
100 TABLET ORAL DAILY
Qty: 30 TABLET | Refills: 11 | Status: SHIPPED | OUTPATIENT
Start: 2019-08-12 | End: 2020-08-24 | Stop reason: SDUPTHER

## 2019-08-12 NOTE — TELEPHONE ENCOUNTER
----- Message from Alissa Munoz sent at 8/12/2019  1:06 PM CDT -----  Contact: Self/  [phone]  Type: Patient Call Back    Who called:  Patient    What is the request in detail: Patient stated she can not take levothyroxine, she needs the brand name levoxyl.      Would the patient rather a call back or a response via [facility]?   Call back    Best call back number:  [phone]

## 2019-08-15 NOTE — TELEPHONE ENCOUNTER
I left a message for the pt that I spoke to the pharmacy and clarified the need for Brand name Lexoxyl and it would be ready today for .

## 2019-08-19 ENCOUNTER — TELEPHONE (OUTPATIENT)
Dept: OPTOMETRY | Facility: CLINIC | Age: 59
End: 2019-08-19

## 2019-08-19 ENCOUNTER — OFFICE VISIT (OUTPATIENT)
Dept: OPTOMETRY | Facility: CLINIC | Age: 59
End: 2019-08-19
Payer: COMMERCIAL

## 2019-08-19 DIAGNOSIS — H47.20 OPTIC NERVE ATROPHY: ICD-10-CM

## 2019-08-19 DIAGNOSIS — H25.13 NUCLEAR SCLEROSIS OF BOTH EYES: ICD-10-CM

## 2019-08-19 DIAGNOSIS — H35.3131 EARLY DRY STAGE NONEXUDATIVE AGE-RELATED MACULAR DEGENERATION OF BOTH EYES: ICD-10-CM

## 2019-08-19 DIAGNOSIS — H52.7 REFRACTIVE ERROR: ICD-10-CM

## 2019-08-19 DIAGNOSIS — Z01.00 ROUTINE EYE EXAM: Primary | ICD-10-CM

## 2019-08-19 PROCEDURE — 99999 PR PBB SHADOW E&M-EST. PATIENT-LVL II: ICD-10-PCS | Mod: PBBFAC,,, | Performed by: OPTOMETRIST

## 2019-08-19 PROCEDURE — 92134 POSTERIOR SEGMENT OCT RETINA (OCULAR COHERENCE TOMOGRAPHY)-BOTH EYES: ICD-10-PCS | Mod: S$GLB,,, | Performed by: OPTOMETRIST

## 2019-08-19 PROCEDURE — 92014 COMPRE OPH EXAM EST PT 1/>: CPT | Mod: S$GLB,,, | Performed by: OPTOMETRIST

## 2019-08-19 PROCEDURE — 92014 PR EYE EXAM, EST PATIENT,COMPREHESV: ICD-10-PCS | Mod: S$GLB,,, | Performed by: OPTOMETRIST

## 2019-08-19 PROCEDURE — 92134 CPTRZ OPH DX IMG PST SGM RTA: CPT | Mod: S$GLB,,, | Performed by: OPTOMETRIST

## 2019-08-19 PROCEDURE — 99999 PR PBB SHADOW E&M-EST. PATIENT-LVL II: CPT | Mod: PBBFAC,,, | Performed by: OPTOMETRIST

## 2019-08-19 NOTE — PROGRESS NOTES
RHEUMATOLOGY CLINIC FOLLOW UP VISIT  Chief complaints:- follow up for RA       HPI:-  Darlyn Ibarra a 59 y.o. pleasant female with history of non-erosive migratory arthritis (+ and +).  Patient here today with complaints of LE (L>R) ankle pain and mild hand pain.  Patient was started on MTX 15mg Qweekly in May 2018 following findings of synovitis of the hands and ankles.      Today still complaining of bilateral feet and ankle pain (L>R).  Hand discomfort had improved with MTX but still aching.      After starting MTX, pain is better controlled.  Prior to the starting of MTX, patient was unable to walk.  Now, she is able to ambulate without assistance.  Pain is more a dull sensation but persistent and constant.   Tolerating medication well without any complications.  Compliant with all home medications.       Associated symptoms include bilateral hand and feet swelling.  AM stiffness of a couple of hours (feet/ankles).  Patient continues to feel generalized fatigue/maliase after ~1 hour of ambulation.     Interval History  Tolerating and compliant with MTX SQ and folic acid. Currently on 1mg of SQ MTX and resolution of joint arthralgia.  Feels significant improvement with the increase of the MTX.  Not using the Mobic.     AM stiffness x few mins (improvement since last visit).      Denies any fever/chills, recent trauma, N/V, mouth ulcer, GI upset, diarrhea/constipation, urinary symptoms, SOB, or CP    Review of Systems   Constitutional: Negative for chills, diaphoresis, fever, malaise/fatigue and weight loss.   HENT: Negative for congestion, ear discharge, ear pain, hearing loss, nosebleeds, sinus pain and tinnitus.    Eyes: Negative for blurred vision, photophobia, pain, discharge and redness.   Respiratory: Negative for cough, hemoptysis, sputum production, shortness of breath, wheezing and stridor.    Cardiovascular: Negative for chest pain,  "palpitations, orthopnea, claudication, leg swelling and PND.   Gastrointestinal: Negative for abdominal pain, constipation, diarrhea, heartburn, nausea and vomiting.   Genitourinary: Negative for dysuria, frequency, hematuria and urgency.   Musculoskeletal: Negative for back pain, joint pain, myalgias and neck pain.   Skin: Negative for rash.   Neurological: Negative for dizziness, tingling, tremors, weakness and headaches.   Endo/Heme/Allergies: Does not bruise/bleed easily.   Psychiatric/Behavioral: Negative for depression, hallucinations and suicidal ideas. The patient is not nervous/anxious and does not have insomnia.        Past Medical History:   Diagnosis Date    Arthritis     Depression     Early dry stage nonexudative age-related macular degeneration of both eyes 8/19/2019    Elevated alkaline phosphatase level     Hyperlipidemia     Nuclear sclerosis of both eyes 8/19/2019    Osteopenia     Personal history of female infertility     Respiratory distress     " stopped Breathing" after 2 pain meds were given in a close time frame to one another.    Thyroid disease     multinodular goiter    Vitamin D deficiency disease        Past Surgical History:   Procedure Laterality Date    BREAST BIOPSY Left     over 10 yrs ago    BREAST SURGERY      benign biopsy on left    COLONOSCOPY N/A 6/7/2019    [facility] ([facility])    COLONOSCOPY N/A 2/19/2013    [facility]    HYSTERECTOMY      HYSTERECTOMY, SUPRACERVICAL  10/28/2013    [facility] at [facility] OR    LAPAROSCOPY W/ MINI-LAPAROTOMY      for fertility issues    OOPHORECTOMY      ME REMOVAL OF OVARY/TUBE(S)      RECTAL SURGERY      excess skin growth removal    SALPINGO-OOPHORECTOMY, BILATERAL Bilateral 10/28/2013    [facility] at [facility] OR    supracervical hysterectomy      TONSILLECTOMY          Social History     Tobacco Use    Smoking " "status: Former Smoker     Packs/day: 2.50     Years: 30.00     Pack years: 75.00    Smokeless tobacco: Never Used   Substance Use Topics    Alcohol use: No     Frequency: Never     Comment: Rare    Drug use: No       Family History   Problem Relation Age of Onset    Breast cancer Mother     Hyperlipidemia Mother     Macular degeneration Mother     Emphysema Father     No Known Problems Sister     No Known Problems Brother     Macular degeneration Maternal Aunt     No Known Problems Maternal Uncle     No Known Problems Paternal Aunt     No Known Problems Paternal Uncle     No Known Problems Maternal Grandmother     No Known Problems Maternal Grandfather     No Known Problems Paternal Grandmother     No Known Problems Paternal Grandfather     Colon cancer Neg Hx     Ovarian cancer Neg Hx     Melanoma Neg Hx     Amblyopia Neg Hx     Blindness Neg Hx     Cancer Neg Hx     Cataracts Neg Hx     Diabetes Neg Hx     Hypertension Neg Hx     Retinal detachment Neg Hx     Strabismus Neg Hx     Stroke Neg Hx     Thyroid disease Neg Hx     Glaucoma Neg Hx        Review of patient's allergies indicates:   Allergen Reactions    Hydromorphone Shortness Of Breath    Percocet  [oxycodone-acetaminophen] Itching and Nausea Only    Vicodin  [hydrocodone-acetaminophen] Nausea Only and Nausea And Vomiting           Physical examination:-    Vitals:    08/21/19 0925   BP: 111/80   Pulse: 106   Weight: 68.2 kg (150 lb 5.7 oz)   Height: 5' 2" (1.575 m)   PainSc:   2       Physical Exam   Constitutional: She is oriented to person, place, and time and well-developed, well-nourished, and in no distress.   HENT:   Head: Normocephalic and atraumatic.   Eyes: Pupils are equal, round, and reactive to light. Conjunctivae are normal.   Neck: Normal range of motion. Neck supple.   Cardiovascular: Normal rate, regular rhythm and normal heart sounds.   Pulmonary/Chest: Effort normal and breath sounds normal. "   Abdominal: Soft. Bowel sounds are normal.   Musculoskeletal: Normal range of motion.   Mild 2nd bilateral hand MCP swelling.   No signs of enthesis   No swelling or signs of synovitis of the hand  ROM complete in all joints     Neurological: She is alert and oriented to person, place, and time. Gait normal.   Skin: Skin is warm and dry.   No rashes   Psychiatric: Mood, memory, affect and judgment normal.       Labs:-  Results for BRITTNEY REICH (MRN [id number]) as of 8/21/2019 09:37   Ref. Range 2/18/2019 08:20 3/7/2019 08:31 3/14/2019 11:22 4/25/2019 08:51 7/5/2019 09:09   WBC Latest Ref Range: 3.90 - 12.70 K/uL  7.84  8.29 9.12   RBC Latest Ref Range: 4.00 - 5.40 M/uL  4.27  4.33 4.30   Hemoglobin Latest Ref Range: 12.0 - 16.0 g/dL  12.9  13.1 13.3   Hematocrit Latest Ref Range: 37.0 - 48.5 %  40.1  41.0 42.5   MCV Latest Ref Range: 82 - 98 fL  94  95 99 (H)   MCH Latest Ref Range: 27.0 - 31.0 pg  30.2  30.3 30.9   MCHC Latest Ref Range: 32.0 - 36.0 g/dL  32.2  32.0 31.3 (L)   RDW Latest Ref Range: 11.5 - 14.5 %  13.2  13.7 14.4   Platelets Latest Ref Range: 150 - 350 K/uL  351 (H)  344 348   MPV Latest Ref Range: 9.2 - 12.9 fL  10.1  10.4 10.5   Gran% Latest Ref Range: 38.0 - 73.0 %  63.4  66.1 69.5   Gran # (ANC) Latest Ref Range: 1.8 - 7.7 K/uL  5.0  5.5 6.3   Lymph% Latest Ref Range: 18.0 - 48.0 %  27.2  24.8 22.6   Lymph # Latest Ref Range: 1.0 - 4.8 K/uL  2.1  2.1 2.1   Mono% Latest Ref Range: 4.0 - 15.0 %  5.9  5.7 4.7   Mono # Latest Ref Range: 0.3 - 1.0 K/uL  0.5  0.5 0.4   Eosinophil% Latest Ref Range: 0.0 - 8.0 %  2.8  2.5 2.5   Eos # Latest Ref Range: 0.0 - 0.5 K/uL  0.2  0.2 0.2   Basophil% Latest Ref Range: 0.0 - 1.9 %  0.6  0.7 0.4   Baso # Latest Ref Range: 0.00 - 0.20 K/uL  0.05  0.06 0.04   nRBC Latest Ref Range: 0 /100 WBC  0  0 0   Differential Method Unknown  Automated  Automated Automated   Immature Grans (Abs) Latest Ref Range: 0.00 - 0.04 K/uL  0.01  0.02 0.03   Immature Granulocytes Latest  Ref Range: 0.0 - 0.5 %  0.1  0.2 0.3   Sed Rate Latest Ref Range: 0 - 36 mm/Hr  30  27 23   Sodium Latest Ref Range: 136 - 145 mmol/L  142  140 141   Potassium Latest Ref Range: 3.5 - 5.1 mmol/L  3.6  3.9 3.9   Chloride Latest Ref Range: 95 - 110 mmol/L  106  104 105   CO2 Latest Ref Range: 23 - 29 mmol/L  28  28 28   Anion Gap Latest Ref Range: 8 - 16 mmol/L  8  8 8   BUN, Bld Latest Ref Range: 6 - 20 mg/dL  9  11 9   Creatinine Latest Ref Range: 0.5 - 1.4 mg/dL  0.7  0.8 0.8   eGFR if non African American Latest Ref Range: >60 mL/min/1.73 m^2  >60.0  >60.0 >60.0   eGFR if African American Latest Ref Range: >60 mL/min/1.73 m^2  >60.0  >60.0 >60.0   Glucose Latest Ref Range: 70 - 110 mg/dL  94  94 95   Calcium Latest Ref Range: 8.7 - 10.5 mg/dL  9.3  9.5 9.8   Alkaline Phosphatase Latest Ref Range: 55 - 135 U/L  129  137 (H) 126   PROTEIN TOTAL Latest Ref Range: 6.0 - 8.4 g/dL  6.5  6.9 6.9   Albumin Latest Ref Range: 3.5 - 5.2 g/dL  3.5  3.6 3.8   BILIRUBIN TOTAL Latest Ref Range: 0.1 - 1.0 mg/dL  0.5  0.5 0.4   AST Latest Ref Range: 10 - 40 U/L  10  10 12   ALT Latest Ref Range: 10 - 44 U/L  15  16 23   CRP Latest Ref Range: 0.0 - 8.2 mg/L  7.8  9.6 (H) 6.0   XR ANKLE COMPLETE 3 VIEW BILATERAL Unknown   Rpt     MAMMO DIGITAL SCREENING BILAT WITH RUBY Unknown Rpt         Imaging  Independent visualization of images done.   Xray of hand bilateral - no fracture or erosion  X ray foot - bilateral foot mild DJD  Xray ankle - normal. No fracture or erosion    Medication List with Changes/Refills   Current Medications    ALBUTEROL (PROVENTIL HFA/VENTOLIN HFA) 200 PUFF INHALER    Inhale 2 puffs into the lungs every 4 (four) hours as needed. 2 HFA Aerosol Inhaler Inhalation Every 4-6 hours    ERGOCALCIFEROL (ERGOCALCIFEROL) 50,000 UNIT CAP    Take 1 capsule (50,000 Units total) by mouth every 7 days.    LEVOTHYROXINE (SYNTHROID) 100 MCG TABLET    Take 1 tablet (100 mcg total) by mouth once daily. Fill brand name     "MELOXICAM (MOBIC) 15 MG TABLET    TAKE 1 TABLET(15 MG) BY MOUTH EVERY DAY AS NEEDED    SYRINGE AND NEEDLE,INSULIN,1ML (INSULIN SYRINGE-NEEDLE U-100) 1 ML 29 GAUGE X 7/16" SYRG    U TO INJECT 0.6 ML ONCE A WEEK   Changed and/or Refilled Medications    Modified Medication Previous Medication    FOLIC ACID (FOLVITE) 1 MG TABLET folic acid (FOLVITE) 1 MG tablet       Take 1 tablet (1 mg total) by mouth once daily.    Take 1 tablet (1 mg total) by mouth once daily.    METHOTREXATE 25 MG/ML INJECTION methotrexate 25 mg/mL injection       Please take 0.8mL into the skin once a week x 4 weeks and if tolerating 1mL afterwards    Please take 0.8mL into the skin once a week x 4 weeks and if tolerating 1mL afterwards       Assessment/Plans:-   58 y.o. pleasant female with history of non-erosive migratory arthritis (+ and +).  Patient was started on MTX 20mg Qweekly in May 2018 following findings of synovitis of the hands and ankles.    Arthralgia with significant improvement with MTX 25mg SQ.  Tolerating medication well without any complication.  Compliant with medications (takes MTX QSunday with daily folic acid).  Patient states that she is uncomfortable with current injection - inquiring to see if there's other methods of delivery for better ease    DAS28 CRP - 2.62        Plan  - Increase to MTX 1mg (25mg Qweekly) - refill provided.  - Message sent to [facility] for [place] to see if the co-pay and insurance approval    - Continue folate acid daily - refill provided    - Continue Mobic PRN as needed for pain   - Continue PT - patient refuse at this time  - Repeat CBC, CMP, ESR/CRP prior to the next visit            # Follow up in about 3 months (around 11/21/2019).        "

## 2019-08-19 NOTE — TELEPHONE ENCOUNTER
[place] [employer] benefits as of 19:       Member Name : AMANDA REICH  ID : [id number]  Group : [facility] [employer]   Patient Name : BRITTNEY REICH  Relationship : SPOUSE   : 1960     Authorization Issued       Authorization Number: [id number]    Issue Date: 2019    Expiration Date: 2019    Services: Eye Examination    Examination Copayment: $25.00

## 2019-08-19 NOTE — PATIENT INSTRUCTIONS
"    What Is Age-Related Macular Degeneration (AMD)?    Age-related macular degeneration (AMD) is an eye disease. AMD is the leading cause of vision loss in adults over age 50. One or both eyes may be affected. The macula (the part of the eye that controls your central, detailed vision) becomes damaged. Central vision becomes limited. However, side vision remains clear. There are two types of macular degeneration: "dry" and "wet."     Dry macular degeneration       Wet macular degeneration      Dry macular degeneration  Dry is the most common type of macular degeneration. In the early stages, changes in vision may be hard to notice. Over time, your central vision may slowly worsen. You may notice wavy lines and blank spots in the center of your vision. Colors may look dim. There is no way to restore vision lost from dry macular degeneration. But you need to monitor it because it can turn into wet macular degeneration.  Wet macular degeneration  Wet macular degeneration is less common but more serious. Vision loss may be faster and more noticeable. You may suddenly see dark spots, blank spots, wavy lines, and dim colors in the center of your vision. If wet macular degeneration is caught early, certain treatments, such as injections, photodynamic therapy, or laser surgery, may help to slow further vision loss.  Date Last Reviewed: 6/8/2015  © 8092-2727 [employer]. [address] [place] [address], [place] [address]. All rights reserved. This information is not intended as a substitute for professional medical care. Always follow your healthcare professional's instructions.    Most people with macular degeneration have the dry form, for which there is no known treatment but vitamins such as Ocuvite, Preservision, or ICAPs are often used to reduced the risk of progression. It is recommended to get the formula with Lutein     Some common symptoms are: a gradual loss of ability to see objects clearly, distorted " vision, a gradual loss of color vision, and a dark or empty area appearing in the center of vision. You should monitor your vision with Amsler grid once a week.

## 2019-08-19 NOTE — PROGRESS NOTES
Subjective:       Patient ID: Darlyn Donato is a 59 y.o. female      Chief Complaint   Patient presents with    Concerns About Ocular Health     History of Present Illness  Dls 8/6/18 Dr. Ogden     60 y/o female presents today with c/o blurry vision at distance ou.   Pt wears pal's.     No tearing  No itching  No burning  No pain  No ha's  No floaters  No flashes    Eye meds  Rewetting gtts ou prn           Assessment/Plan:     1. Routine eye exam  Lonny vision    2. Nuclear sclerosis of both eyes  NVS. Monitor.    3. 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 Stable.    4. Early dry stage nonexudative age-related macular degeneration of both eyes  Mom has wet AMD, undergoing injections with Dr. Claros.    Educated patient on dry macular degeneration and the possibility of worsening, including progression to wet AMD. Discussed with pt use of nutritional supplements options and benefits and use of at home Amsler Grid once a week to detect vision changes. RTC 1 year, sooner if any vision changes.     - Posterior Segment OCT Retina-Both eyes    5. Refractive error  Educated patient on refractive error and discussed lens options. Dispensed updated spectacle Rx. Educated about adaptation period to new specs.    Eyeglass Final Rx     Eyeglass Final Rx       Sphere Cylinder Axis Add    Right -6.00 +0.75 115 +2.50    Left -4.75 +1.25 100 +2.50    Expiration Date:  8/19/2020                  Follow up in about 1 year (around 8/19/2020), or if symptoms worsen or fail to improve.

## 2019-08-21 ENCOUNTER — LAB VISIT (OUTPATIENT)
Dept: LAB | Facility: HOSPITAL | Age: 59
End: 2019-08-21
Payer: COMMERCIAL

## 2019-08-21 ENCOUNTER — OFFICE VISIT (OUTPATIENT)
Dept: RHEUMATOLOGY | Facility: CLINIC | Age: 59
End: 2019-08-21
Payer: COMMERCIAL

## 2019-08-21 VITALS
SYSTOLIC BLOOD PRESSURE: 111 MMHG | HEIGHT: 62 IN | HEART RATE: 106 BPM | DIASTOLIC BLOOD PRESSURE: 80 MMHG | WEIGHT: 150.38 LBS | BODY MASS INDEX: 27.67 KG/M2

## 2019-08-21 DIAGNOSIS — Z79.60 LONG-TERM USE OF IMMUNOSUPPRESSANT MEDICATION: ICD-10-CM

## 2019-08-21 DIAGNOSIS — M19.90 OSTEOARTHRITIS, UNSPECIFIED OSTEOARTHRITIS TYPE, UNSPECIFIED SITE: ICD-10-CM

## 2019-08-21 DIAGNOSIS — M13.0 POLYARTHRITIS: ICD-10-CM

## 2019-08-21 DIAGNOSIS — M13.0 POLYARTHRITIS: Primary | ICD-10-CM

## 2019-08-21 DIAGNOSIS — M05.79 RHEUMATOID ARTHRITIS INVOLVING MULTIPLE SITES WITH POSITIVE RHEUMATOID FACTOR: ICD-10-CM

## 2019-08-21 DIAGNOSIS — E03.9 HYPOTHYROIDISM, UNSPECIFIED TYPE: ICD-10-CM

## 2019-08-21 LAB
ALBUMIN SERPL BCP-MCNC: 3.8 G/DL (ref 3.5–5.2)
ALP SERPL-CCNC: 132 U/L (ref 55–135)
ALT SERPL W/O P-5'-P-CCNC: 25 U/L (ref 10–44)
ANION GAP SERPL CALC-SCNC: 9 MMOL/L (ref 8–16)
AST SERPL-CCNC: 18 U/L (ref 10–40)
BASOPHILS # BLD AUTO: 0.06 K/UL (ref 0–0.2)
BASOPHILS NFR BLD: 0.7 % (ref 0–1.9)
BILIRUB SERPL-MCNC: 0.6 MG/DL (ref 0.1–1)
BUN SERPL-MCNC: 8 MG/DL (ref 6–20)
CALCIUM SERPL-MCNC: 9.6 MG/DL (ref 8.7–10.5)
CHLORIDE SERPL-SCNC: 104 MMOL/L (ref 95–110)
CO2 SERPL-SCNC: 28 MMOL/L (ref 23–29)
CREAT SERPL-MCNC: 0.8 MG/DL (ref 0.5–1.4)
CRP SERPL-MCNC: 27.8 MG/L (ref 0–8.2)
DIFFERENTIAL METHOD: ABNORMAL
EOSINOPHIL # BLD AUTO: 0.2 K/UL (ref 0–0.5)
EOSINOPHIL NFR BLD: 1.8 % (ref 0–8)
ERYTHROCYTE [DISTWIDTH] IN BLOOD BY AUTOMATED COUNT: 13.7 % (ref 11.5–14.5)
ERYTHROCYTE [SEDIMENTATION RATE] IN BLOOD BY WESTERGREN METHOD: 28 MM/HR (ref 0–36)
EST. GFR  (AFRICAN AMERICAN): >60 ML/MIN/1.73 M^2
EST. GFR  (NON AFRICAN AMERICAN): >60 ML/MIN/1.73 M^2
GLUCOSE SERPL-MCNC: 93 MG/DL (ref 70–110)
HCT VFR BLD AUTO: 42.6 % (ref 37–48.5)
HGB BLD-MCNC: 13.2 G/DL (ref 12–16)
IMM GRANULOCYTES # BLD AUTO: 0.03 K/UL (ref 0–0.04)
IMM GRANULOCYTES NFR BLD AUTO: 0.3 % (ref 0–0.5)
LYMPHOCYTES # BLD AUTO: 2.1 K/UL (ref 1–4.8)
LYMPHOCYTES NFR BLD: 22.9 % (ref 18–48)
MCH RBC QN AUTO: 30.8 PG (ref 27–31)
MCHC RBC AUTO-ENTMCNC: 31 G/DL (ref 32–36)
MCV RBC AUTO: 99 FL (ref 82–98)
MONOCYTES # BLD AUTO: 0.4 K/UL (ref 0.3–1)
MONOCYTES NFR BLD: 4.2 % (ref 4–15)
NEUTROPHILS # BLD AUTO: 6.3 K/UL (ref 1.8–7.7)
NEUTROPHILS NFR BLD: 70.1 % (ref 38–73)
NRBC BLD-RTO: 0 /100 WBC
PLATELET # BLD AUTO: 380 K/UL (ref 150–350)
PMV BLD AUTO: 9.6 FL (ref 9.2–12.9)
POTASSIUM SERPL-SCNC: 3.8 MMOL/L (ref 3.5–5.1)
PROT SERPL-MCNC: 7.2 G/DL (ref 6–8.4)
RBC # BLD AUTO: 4.29 M/UL (ref 4–5.4)
SODIUM SERPL-SCNC: 141 MMOL/L (ref 136–145)
WBC # BLD AUTO: 9.01 K/UL (ref 3.9–12.7)

## 2019-08-21 PROCEDURE — 86140 C-REACTIVE PROTEIN: CPT

## 2019-08-21 PROCEDURE — 99214 OFFICE O/P EST MOD 30 MIN: CPT | Mod: S$GLB,,, | Performed by: STUDENT IN AN ORGANIZED HEALTH CARE EDUCATION/TRAINING PROGRAM

## 2019-08-21 PROCEDURE — 99999 PR PBB SHADOW E&M-EST. PATIENT-LVL III: CPT | Mod: PBBFAC,,, | Performed by: STUDENT IN AN ORGANIZED HEALTH CARE EDUCATION/TRAINING PROGRAM

## 2019-08-21 PROCEDURE — 3008F PR BODY MASS INDEX (BMI) DOCUMENTED: ICD-10-PCS | Mod: CPTII,S$GLB,, | Performed by: STUDENT IN AN ORGANIZED HEALTH CARE EDUCATION/TRAINING PROGRAM

## 2019-08-21 PROCEDURE — 99999 PR PBB SHADOW E&M-EST. PATIENT-LVL III: ICD-10-PCS | Mod: PBBFAC,,, | Performed by: STUDENT IN AN ORGANIZED HEALTH CARE EDUCATION/TRAINING PROGRAM

## 2019-08-21 PROCEDURE — 3008F BODY MASS INDEX DOCD: CPT | Mod: CPTII,S$GLB,, | Performed by: STUDENT IN AN ORGANIZED HEALTH CARE EDUCATION/TRAINING PROGRAM

## 2019-08-21 PROCEDURE — 3074F PR MOST RECENT SYSTOLIC BLOOD PRESSURE < 130 MM HG: ICD-10-PCS | Mod: CPTII,S$GLB,, | Performed by: STUDENT IN AN ORGANIZED HEALTH CARE EDUCATION/TRAINING PROGRAM

## 2019-08-21 PROCEDURE — 85025 COMPLETE CBC W/AUTO DIFF WBC: CPT

## 2019-08-21 PROCEDURE — 80053 COMPREHEN METABOLIC PANEL: CPT

## 2019-08-21 PROCEDURE — 99214 PR OFFICE/OUTPT VISIT, EST, LEVL IV, 30-39 MIN: ICD-10-PCS | Mod: S$GLB,,, | Performed by: STUDENT IN AN ORGANIZED HEALTH CARE EDUCATION/TRAINING PROGRAM

## 2019-08-21 PROCEDURE — 36415 COLL VENOUS BLD VENIPUNCTURE: CPT

## 2019-08-21 PROCEDURE — 3079F DIAST BP 80-89 MM HG: CPT | Mod: CPTII,S$GLB,, | Performed by: STUDENT IN AN ORGANIZED HEALTH CARE EDUCATION/TRAINING PROGRAM

## 2019-08-21 PROCEDURE — 3079F PR MOST RECENT DIASTOLIC BLOOD PRESSURE 80-89 MM HG: ICD-10-PCS | Mod: CPTII,S$GLB,, | Performed by: STUDENT IN AN ORGANIZED HEALTH CARE EDUCATION/TRAINING PROGRAM

## 2019-08-21 PROCEDURE — 85652 RBC SED RATE AUTOMATED: CPT

## 2019-08-21 PROCEDURE — 3074F SYST BP LT 130 MM HG: CPT | Mod: CPTII,S$GLB,, | Performed by: STUDENT IN AN ORGANIZED HEALTH CARE EDUCATION/TRAINING PROGRAM

## 2019-08-21 RX ORDER — FOLIC ACID 1 MG/1
1 TABLET ORAL DAILY
Qty: 30 TABLET | Refills: 11 | Status: SHIPPED | OUTPATIENT
Start: 2019-08-21 | End: 2019-10-30 | Stop reason: SDUPTHER

## 2019-08-21 RX ORDER — METHOTREXATE 25 MG/ML
25 INJECTION, SOLUTION INTRA-ARTERIAL; INTRAMUSCULAR; INTRAVENOUS WEEKLY
Qty: 4 ML | Refills: 3 | Status: SHIPPED | OUTPATIENT
Start: 2019-08-21 | End: 2019-09-09

## 2019-08-21 ASSESSMENT — DISEASE ACTIVITY SCORE (DAS28)
TOTAL_SCORE_CRP: 2.62
TENDER_JOINTS_COUNT: 0
CRP_MG_PER_LITER: 6
GLOBAL_HEALTH_SCORE: 40
TOTAL_SCORE_ESR: 3.15
SWOLLEN_JOINTS_COUNT: 2
ESR_MM_PER_HR: 23

## 2019-08-21 NOTE — PROGRESS NOTES
I have reviewed the notes, documentation and sent our specialty pharmacy request to change mtx to autoinjector

## 2019-08-21 NOTE — PROGRESS NOTES
I agree with the documentation by     58 year old female with non-erosive RA    Doing great with 1 ml weekly mtx/folic acid  Will continue

## 2019-08-22 ENCOUNTER — TELEPHONE (OUTPATIENT)
Dept: PHARMACY | Facility: CLINIC | Age: 59
End: 2019-08-22

## 2019-08-22 NOTE — TELEPHONE ENCOUNTER
Informed Patient  that [facility] received prescription for Otrexup and benefits investigation is required.  [facility] will be back in touch once insurance determination is received.

## 2019-08-26 NOTE — TELEPHONE ENCOUNTER
DOCUMENTATION ONLY:  Prior authorization for Otrexup not required.    Co-pay: $80.00    Patient Assistance is required.

## 2019-08-26 NOTE — TELEPHONE ENCOUNTER
FOR DOCUMENTATION ONLY:  Financial Assistance for Otrexup approved  Source: Copay Card  BIN: [id number]  ID: [id number]  Group: [id number]  $0.00 copay

## 2019-09-04 ENCOUNTER — TELEPHONE (OUTPATIENT)
Dept: PHARMACY | Facility: CLINIC | Age: 59
End: 2019-09-04

## 2019-09-18 DIAGNOSIS — E55.9 VITAMIN D DEFICIENCY DISEASE: ICD-10-CM

## 2019-09-25 DIAGNOSIS — E55.9 VITAMIN D DEFICIENCY DISEASE: ICD-10-CM

## 2019-09-25 RX ORDER — ERGOCALCIFEROL 1.25 MG/1
CAPSULE ORAL
Qty: 12 CAPSULE | Refills: 0 | OUTPATIENT
Start: 2019-09-25

## 2019-09-25 NOTE — TELEPHONE ENCOUNTER
----- Message from Kathryn Joshi sent at 9/25/2019  9:39 AM CDT -----  Contact: Self  Type: RX Refill Request    Who Called: Darlyn    Refill or New Rx:Refill    RX Name and Strength:ergocalciferol (ERGOCALCIFEROL) 50,000 unit Cap 12 capsule   albuterol (PROVENTIL HFA/VENTOLIN HFA) 200 puff inhaler    Is this a 30 day or 90 day RX: 90    Preferred Pharmacy with phone number:[id number] - [place], [place] - [address] AT [address] [facility] [phone] (Phone)  [phone] (Fax)        Local or Mail Order:Local    Ordering Provider:Alo    Would the patient rather a call back or a response via [facility]? Call    Best Call Back Number:[phone]    Additional Information: Medication refill

## 2019-09-26 DIAGNOSIS — E55.9 VITAMIN D DEFICIENCY: Primary | ICD-10-CM

## 2019-09-26 DIAGNOSIS — Z00.00 HEALTHCARE MAINTENANCE: ICD-10-CM

## 2019-09-26 RX ORDER — ERGOCALCIFEROL 1.25 MG/1
50000 CAPSULE ORAL
Qty: 12 CAPSULE | Refills: 0 | OUTPATIENT
Start: 2019-09-26

## 2019-09-26 RX ORDER — ALBUTEROL SULFATE 90 UG/1
2 AEROSOL, METERED RESPIRATORY (INHALATION) EVERY 4 HOURS PRN
Qty: 18 G | Refills: 2 | Status: SHIPPED | OUTPATIENT
Start: 2019-09-26 | End: 2024-01-29 | Stop reason: SDUPTHER

## 2019-09-26 NOTE — TELEPHONE ENCOUNTER
----- Message from Amando Prajapati MD sent at 9/26/2019 12:10 PM CDT -----  She will need vitamin D level checked to see if she still needs the Rx version.  She may take the over the counter at 1000 units daily for now.  I have ordered labs for her which can be scheduled at her convenience - fasting and follow up with me after.

## 2019-09-27 ENCOUNTER — TELEPHONE (OUTPATIENT)
Dept: PHARMACY | Facility: CLINIC | Age: 59
End: 2019-09-27

## 2019-09-27 NOTE — TELEPHONE ENCOUNTER
The patient contacted [facility] in regards to Otrexup refill. She will take last dose this Sunday therefore refill needed by 10/6. Will ship 10/2 for patient to receive 10/3. $0 copay, address confirmed. No changes in other medications, allergies, health conditions, missed doses or supplies needed.

## 2019-10-23 ENCOUNTER — LAB VISIT (OUTPATIENT)
Dept: LAB | Facility: HOSPITAL | Age: 59
End: 2019-10-23
Attending: STUDENT IN AN ORGANIZED HEALTH CARE EDUCATION/TRAINING PROGRAM
Payer: COMMERCIAL

## 2019-10-23 DIAGNOSIS — M05.79 RHEUMATOID ARTHRITIS INVOLVING MULTIPLE SITES WITH POSITIVE RHEUMATOID FACTOR: ICD-10-CM

## 2019-10-23 DIAGNOSIS — M19.90 OSTEOARTHRITIS, UNSPECIFIED OSTEOARTHRITIS TYPE, UNSPECIFIED SITE: ICD-10-CM

## 2019-10-23 DIAGNOSIS — Z79.60 LONG-TERM USE OF IMMUNOSUPPRESSANT MEDICATION: ICD-10-CM

## 2019-10-23 LAB
ALBUMIN SERPL BCP-MCNC: 3.7 G/DL (ref 3.5–5.2)
ALP SERPL-CCNC: 127 U/L (ref 55–135)
ALT SERPL W/O P-5'-P-CCNC: 88 U/L (ref 10–44)
ANION GAP SERPL CALC-SCNC: 7 MMOL/L (ref 8–16)
AST SERPL-CCNC: 44 U/L (ref 10–40)
BASOPHILS # BLD AUTO: 0.05 K/UL (ref 0–0.2)
BASOPHILS NFR BLD: 0.8 % (ref 0–1.9)
BILIRUB SERPL-MCNC: 0.4 MG/DL (ref 0.1–1)
BUN SERPL-MCNC: 7 MG/DL (ref 6–20)
CALCIUM SERPL-MCNC: 9.2 MG/DL (ref 8.7–10.5)
CHLORIDE SERPL-SCNC: 108 MMOL/L (ref 95–110)
CO2 SERPL-SCNC: 29 MMOL/L (ref 23–29)
CREAT SERPL-MCNC: 0.8 MG/DL (ref 0.5–1.4)
CRP SERPL-MCNC: 11.8 MG/L (ref 0–8.2)
DIFFERENTIAL METHOD: ABNORMAL
EOSINOPHIL # BLD AUTO: 0.2 K/UL (ref 0–0.5)
EOSINOPHIL NFR BLD: 3.1 % (ref 0–8)
ERYTHROCYTE [DISTWIDTH] IN BLOOD BY AUTOMATED COUNT: 14.4 % (ref 11.5–14.5)
EST. GFR  (AFRICAN AMERICAN): >60 ML/MIN/1.73 M^2
EST. GFR  (NON AFRICAN AMERICAN): >60 ML/MIN/1.73 M^2
GLUCOSE SERPL-MCNC: 93 MG/DL (ref 70–110)
HCT VFR BLD AUTO: 40.6 % (ref 37–48.5)
HGB BLD-MCNC: 12.9 G/DL (ref 12–16)
IMM GRANULOCYTES # BLD AUTO: 0.02 K/UL (ref 0–0.04)
IMM GRANULOCYTES NFR BLD AUTO: 0.3 % (ref 0–0.5)
LYMPHOCYTES # BLD AUTO: 1.7 K/UL (ref 1–4.8)
LYMPHOCYTES NFR BLD: 26.6 % (ref 18–48)
MCH RBC QN AUTO: 30.6 PG (ref 27–31)
MCHC RBC AUTO-ENTMCNC: 31.8 G/DL (ref 32–36)
MCV RBC AUTO: 96 FL (ref 82–98)
MONOCYTES # BLD AUTO: 0.5 K/UL (ref 0.3–1)
MONOCYTES NFR BLD: 7.8 % (ref 4–15)
NEUTROPHILS # BLD AUTO: 4 K/UL (ref 1.8–7.7)
NEUTROPHILS NFR BLD: 61.4 % (ref 38–73)
NRBC BLD-RTO: 0 /100 WBC
PLATELET # BLD AUTO: 331 K/UL (ref 150–350)
PMV BLD AUTO: 10.7 FL (ref 9.2–12.9)
POTASSIUM SERPL-SCNC: 4 MMOL/L (ref 3.5–5.1)
PROT SERPL-MCNC: 6.7 G/DL (ref 6–8.4)
RBC # BLD AUTO: 4.22 M/UL (ref 4–5.4)
SODIUM SERPL-SCNC: 144 MMOL/L (ref 136–145)
WBC # BLD AUTO: 6.5 K/UL (ref 3.9–12.7)

## 2019-10-23 PROCEDURE — 36415 COLL VENOUS BLD VENIPUNCTURE: CPT | Mod: PO

## 2019-10-23 PROCEDURE — 85025 COMPLETE CBC W/AUTO DIFF WBC: CPT

## 2019-10-23 PROCEDURE — 80053 COMPREHEN METABOLIC PANEL: CPT

## 2019-10-23 PROCEDURE — 86140 C-REACTIVE PROTEIN: CPT

## 2019-10-28 ENCOUNTER — TELEPHONE (OUTPATIENT)
Dept: FAMILY MEDICINE | Facility: CLINIC | Age: 59
End: 2019-10-28

## 2019-10-28 ENCOUNTER — PATIENT OUTREACH (OUTPATIENT)
Dept: ADMINISTRATIVE | Facility: OTHER | Age: 59
End: 2019-10-28

## 2019-10-28 NOTE — TELEPHONE ENCOUNTER
----- Message from AnatolyChrissylvester Gibson sent at 10/28/2019  3:58 PM CDT -----  Contact: Self  Type: Patient Call Back    Who called:Self    What is the request in detail: She has two apts and needs clarification.     Can the clinic reply by [facility]?No    Would the patient rather a call back or a response via [facility]? Callback    Best call back number:[phone]    Additional Information:n/a

## 2019-10-29 ENCOUNTER — TELEPHONE (OUTPATIENT)
Dept: PHARMACY | Facility: CLINIC | Age: 59
End: 2019-10-29

## 2019-10-29 NOTE — PROGRESS NOTES
RHEUMATOLOGY CLINIC FOLLOW UP VISIT    Chief complaints:- follow up for RA       HPI:-  Darlyn Ibarra a 59 y.o. pleasant female with history of non-erosive migratory arthritis (+ and +).  Patient here today with complaints of LE (L>R) ankle pain and mild hand pain.  Patient was started on MTX 15mg Qweekly in May 2018 following findings of synovitis of the hands and ankles.      Today still complaining of bilateral feet and ankle pain (L>R).  Hand discomfort had improved with MTX but still aching.      After starting MTX, pain is better controlled.  Prior to the starting of MTX, patient was unable to walk.  Now, she is able to ambulate without assistance.  Pain is more a dull sensation but persistent and constant.   Tolerating medication well without any complications.  Compliant with all home medications.       Associated symptoms include bilateral hand and feet swelling.  AM stiffness of a couple of hours (feet/ankles).  Patient continues to feel generalized fatigue/maliase after ~1 hour of ambulation.     Interval History  Tolerating and compliant with MTX SQ and folic acid. Currently on 1mg of SQ MTX and resolution of joint arthralgia.  Feels significant improvement with the increase of the MTX. The usage of pre-filled of MTX makes administration a lot easier. Not using the Mobic.     AM stiffness x few mins (improvement since last visit).    Had a couple days of lower back pain and taken some Tylenol (up to 4 tablets DS/day).  Used for 3-4 days.  Symptoms resolved.  Patient would occasionally have lower back pain in the past.     Patient currently complaining of LUQ pain that started ~1 month ago and had been progressively worsen.  Pain comes on randomly with no precipatating factors.  Last for a few minutes.  Now occurring more frequently.  Does not wake patient up when sleeping, not worsen with deep inspiration.  No changes in bowel movements, no rash, neuropathy.  +recent  "sick contacts and cough (allergies) for the last month.     Denies any fever/chills, recent trauma, N/V, mouth ulcer, GI upset, diarrhea/constipation, urinary symptoms, SOB, or CP    Review of Systems   Constitutional: Negative for chills, diaphoresis, fever, malaise/fatigue and weight loss.   HENT: Negative for congestion, ear discharge, ear pain, hearing loss, nosebleeds, sinus pain and tinnitus.    Eyes: Negative for blurred vision, photophobia, pain, discharge and redness.   Respiratory: Negative for cough, hemoptysis, sputum production, shortness of breath, wheezing and stridor.    Cardiovascular: Negative for chest pain, palpitations, orthopnea, claudication, leg swelling and PND.   Gastrointestinal: Positive for abdominal pain. Negative for constipation, diarrhea, heartburn, nausea and vomiting.   Genitourinary: Negative for dysuria, frequency, hematuria and urgency.   Musculoskeletal: Negative for back pain, joint pain, myalgias and neck pain.   Skin: Negative for rash.   Neurological: Negative for dizziness, tingling, tremors, weakness and headaches.   Endo/Heme/Allergies: Does not bruise/bleed easily.   Psychiatric/Behavioral: Negative for depression, hallucinations and suicidal ideas. The patient is not nervous/anxious and does not have insomnia.        Past Medical History:   Diagnosis Date    Arthritis     Depression     Early dry stage nonexudative age-related macular degeneration of both eyes 8/19/2019    Elevated alkaline phosphatase level     Hyperlipidemia     Nuclear sclerosis of both eyes 8/19/2019    Osteopenia     Personal history of female infertility     Respiratory distress     " stopped Breathing" after 2 pain meds were given in a close time frame to one another.    Thyroid disease     multinodular goiter    Vitamin D deficiency disease        Past Surgical History:   Procedure Laterality Date    BREAST BIOPSY Left     over 10 yrs ago    BREAST SURGERY      benign biopsy on " left    COLONOSCOPY N/A 6/7/2019    Procedure: COLONOSCOPY;  Surgeon: Julian Waldrop MD;  Location: [facility] ([address] [place] [address]);  Service: Endoscopy;  Laterality: N/A;  2nd floor - per anethesia note from Pt hysterectomy 2013, Pt possible difficult intubation - ERW    HYSTERECTOMY      LAPAROSCOPY W/ MINI-LAPAROTOMY      for fertility issues    OOPHORECTOMY      AL REMOVAL OF OVARY/TUBE(S)      RECTAL SURGERY      excess skin growth removal    supracervical hysterectomy      TONSILLECTOMY          Social History     Tobacco Use    Smoking status: Former Smoker     Packs/day: 2.50     Years: 30.00     Pack years: 75.00    Smokeless tobacco: Never Used   Substance Use Topics    Alcohol use: No     Frequency: Never     Comment: Rare    Drug use: No       Family History   Problem Relation Age of Onset    Breast cancer Mother     Hyperlipidemia Mother     Macular degeneration Mother     Emphysema Father     No Known Problems Sister     No Known Problems Brother     Macular degeneration Maternal Aunt     No Known Problems Maternal Uncle     No Known Problems Paternal Aunt     No Known Problems Paternal Uncle     No Known Problems Maternal Grandmother     No Known Problems Maternal Grandfather     No Known Problems Paternal Grandmother     No Known Problems Paternal Grandfather     Colon cancer Neg Hx     Ovarian cancer Neg Hx     Melanoma Neg Hx     Amblyopia Neg Hx     Blindness Neg Hx     Cancer Neg Hx     Cataracts Neg Hx     Diabetes Neg Hx     Hypertension Neg Hx     Retinal detachment Neg Hx     Strabismus Neg Hx     Stroke Neg Hx     Thyroid disease Neg Hx     Glaucoma Neg Hx        Review of patient's allergies indicates:   Allergen Reactions    Hydromorphone Shortness Of Breath    Percocet  [oxycodone-acetaminophen] Itching and Nausea Only    Vicodin  [hydrocodone-acetaminophen] Nausea Only and Nausea And Vomiting           Physical examination:-    Vitals:    10/30/19 0819   BP:  "113/77   Pulse: 85   Weight: 66.6 kg (146 lb 13.2 oz)   Height: 5' 2" (1.575 m)   PainSc:   1       Physical Exam   Constitutional: She is oriented to person, place, and time and well-developed, well-nourished, and in no distress.   HENT:   Head: Normocephalic and atraumatic.   No mouth ulcers   Eyes: Pupils are equal, round, and reactive to light. Conjunctivae are normal.   Neck: Normal range of motion. Neck supple.   Cardiovascular: Normal rate, regular rhythm and normal heart sounds.   Pulmonary/Chest: Effort normal and breath sounds normal.   Abdominal: Soft. Bowel sounds are normal.   LUQ pain under the ribs  No signs of splenomegaly   Musculoskeletal: Normal range of motion.   No signs of enthesis   No swelling or signs of synovitis of the hand  ROM complete in all joints     Neurological: She is alert and oriented to person, place, and time. Gait normal.   Skin: Skin is warm and dry.   No rashes   Psychiatric: Mood, memory, affect and judgment normal.       Labs:-  Results for BRITTNEY REICH (MRN [id number]) as of 10/30/2019 08:34   Ref. Range 3/14/2019 11:22 4/25/2019 08:51 7/5/2019 09:09 8/21/2019 10:21 10/23/2019 08:45   WBC Latest Ref Range: 3.90 - 12.70 K/uL  8.29 9.12 9.01 6.50   RBC Latest Ref Range: 4.00 - 5.40 M/uL  4.33 4.30 4.29 4.22   Hemoglobin Latest Ref Range: 12.0 - 16.0 g/dL  13.1 13.3 13.2 12.9   Hematocrit Latest Ref Range: 37.0 - 48.5 %  41.0 42.5 42.6 40.6   MCV Latest Ref Range: 82 - 98 fL  95 99 (H) 99 (H) 96   MCH Latest Ref Range: 27.0 - 31.0 pg  30.3 30.9 30.8 30.6   MCHC Latest Ref Range: 32.0 - 36.0 g/dL  32.0 31.3 (L) 31.0 (L) 31.8 (L)   RDW Latest Ref Range: 11.5 - 14.5 %  13.7 14.4 13.7 14.4   Platelets Latest Ref Range: 150 - 350 K/uL  344 348 380 (H) 331   MPV Latest Ref Range: 9.2 - 12.9 fL  10.4 10.5 9.6 10.7   Gran% Latest Ref Range: 38.0 - 73.0 %  66.1 69.5 70.1 61.4   Gran # (ANC) Latest Ref Range: 1.8 - 7.7 K/uL  5.5 6.3 6.3 4.0   Lymph% Latest Ref Range: 18.0 - 48.0 %  " 24.8 22.6 22.9 26.6   Lymph # Latest Ref Range: 1.0 - 4.8 K/uL  2.1 2.1 2.1 1.7   Mono% Latest Ref Range: 4.0 - 15.0 %  5.7 4.7 4.2 7.8   Mono # Latest Ref Range: 0.3 - 1.0 K/uL  0.5 0.4 0.4 0.5   Eosinophil% Latest Ref Range: 0.0 - 8.0 %  2.5 2.5 1.8 3.1   Eos # Latest Ref Range: 0.0 - 0.5 K/uL  0.2 0.2 0.2 0.2   Basophil% Latest Ref Range: 0.0 - 1.9 %  0.7 0.4 0.7 0.8   Baso # Latest Ref Range: 0.00 - 0.20 K/uL  0.06 0.04 0.06 0.05   nRBC Latest Ref Range: 0 /100 WBC  0 0 0 0   Differential Method Unknown  Automated Automated Automated Automated   Immature Grans (Abs) Latest Ref Range: 0.00 - 0.04 K/uL  0.02 0.03 0.03 0.02   Immature Granulocytes Latest Ref Range: 0.0 - 0.5 %  0.2 0.3 0.3 0.3   Sed Rate Latest Ref Range: 0 - 36 mm/Hr  27 23 28    Sodium Latest Ref Range: 136 - 145 mmol/L  140 141 141 144   Potassium Latest Ref Range: 3.5 - 5.1 mmol/L  3.9 3.9 3.8 4.0   Chloride Latest Ref Range: 95 - 110 mmol/L  104 105 104 108   CO2 Latest Ref Range: 23 - 29 mmol/L  28 28 28 29   Anion Gap Latest Ref Range: 8 - 16 mmol/L  8 8 9 7 (L)   BUN, Bld Latest Ref Range: 6 - 20 mg/dL  11 9 8 7   Creatinine Latest Ref Range: 0.5 - 1.4 mg/dL  0.8 0.8 0.8 0.8   eGFR if non African American Latest Ref Range: >60 mL/min/1.73 m^2  >60.0 >60.0 >60.0 >60.0   eGFR if African American Latest Ref Range: >60 mL/min/1.73 m^2  >60.0 >60.0 >60.0 >60.0   Glucose Latest Ref Range: 70 - 110 mg/dL  94 95 93 93   Calcium Latest Ref Range: 8.7 - 10.5 mg/dL  9.5 9.8 9.6 9.2   Alkaline Phosphatase Latest Ref Range: 55 - 135 U/L  137 (H) 126 132 127   PROTEIN TOTAL Latest Ref Range: 6.0 - 8.4 g/dL  6.9 6.9 7.2 6.7   Albumin Latest Ref Range: 3.5 - 5.2 g/dL  3.6 3.8 3.8 3.7   BILIRUBIN TOTAL Latest Ref Range: 0.1 - 1.0 mg/dL  0.5 0.4 0.6 0.4   AST Latest Ref Range: 10 - 40 U/L  10 12 18 44 (H)   ALT Latest Ref Range: 10 - 44 U/L  16 23 25 88 (H)   CRP Latest Ref Range: 0.0 - 8.2 mg/L  9.6 (H) 6.0 27.8 (H) 11.8 (H)   XR ANKLE COMPLETE 3 VIEW  "BILATERAL Unknown Rpt         MAMMO DIGITAL SCREENING BILAT WITH RUBY Unknown Rpt         Imaging  Independent visualization of images done.   Xray of hand bilateral - no fracture or erosion  X ray foot - bilateral foot mild DJD  Xray ankle - normal. No fracture or erosion    Medication List with Changes/Refills   New Medications    BACLOFEN (LIORESAL) 10 MG TABLET    Take 0.5 tablets (5 mg total) by mouth 2 (two) times daily. Start with 0.5 tablets (5mg) in the evening first.  If needed, take it in the daytime   Current Medications    ALBUTEROL (PROAIR HFA) 90 MCG/ACTUATION INHALER    Inhale 2 puffs into the lungs every 4 (four) hours as needed. 2 HFA Aerosol Inhaler Inhalation Every 4-6 hours    LEVOTHYROXINE (SYNTHROID) 100 MCG TABLET    Take 1 tablet (100 mcg total) by mouth once daily. Fill brand name    MELOXICAM (MOBIC) 15 MG TABLET    TAKE 1 TABLET(15 MG) BY MOUTH EVERY DAY AS NEEDED    SYRINGE AND NEEDLE,INSULIN,1ML (INSULIN SYRINGE-NEEDLE U-100) 1 ML 29 GAUGE X 7/16" SYRG    U TO INJECT 0.6 ML ONCE A WEEK   Changed and/or Refilled Medications    Modified Medication Previous Medication    ERGOCALCIFEROL (ERGOCALCIFEROL) 50,000 UNIT CAP ergocalciferol (ERGOCALCIFEROL) 50,000 unit Cap       Take 1 capsule (50,000 Units total) by mouth every 7 days.    Take 1 capsule (50,000 Units total) by mouth every 7 days.    FOLIC ACID (FOLVITE) 1 MG TABLET folic acid (FOLVITE) 1 MG tablet       Take 1 tablet (1 mg total) by mouth once daily.    Take 1 tablet (1 mg total) by mouth once daily.    METHOTREXATE, PF, (OTREXUP, PF,) 25 MG/0.4 ML ATIN methotrexate, PF, (OTREXUP, PF,) 25 mg/0.4 mL AtIn       Inject 0.4 mLs (25 mg total) into the skin every 7 days.    Inject 0.4 mLs (25 mg total) into the skin every 7 days.       Assessment/Plans:-   58 y.o. pleasant female with history of non-erosive migratory arthritis (+ and +).  Patient was started on MTX 25mg Qweekly (Rosuvo) and daily folic acid . Tolerating " medication well without any complication.  Compliant with medications (takes MTX QSunday with daily folic acid).      DAS28 CRP - 2.86 (mild) - CRP improving (from 27.8 --> 11.8)    Recent LFTs show mild elevation (AST 44/ALT 88).  With recent Tylenol usage - concern about the addictive effect of MTX with Tylenol.  Patient had discontinued usage of Tylenol - will repeat CMP.  If continues to be elevated, concern about MTX toxicity.     LUQ pain - splenic etiology vs renal etiology vs MSK.  US abdomen, UA, and trial of baclofen.         Plan  - Continue MTX 1mg (25mg Qweekly) Rasuvo - refill provided.  - Continue folate acid daily - refill provided   - Vit D 50,000 IU Qweekly - refill provided   - CMP, UA, and Abdomen U/S to be done next week  - Trial of baclofen 5mg QHS - if tolerable, can try 5mg BID  - Education provided to update me about usage of baclofen next week  - Discontinue usage of Tylenol - elevation of LFTs   - Follow up with PCP as scheduled (11/4) for evaluation of LUQ tenderness  - Repeat CBC, CMP, ESR/CRP prior to the next visit            # Follow up in about 3 months (around 1/30/2020).

## 2019-10-30 ENCOUNTER — OFFICE VISIT (OUTPATIENT)
Dept: RHEUMATOLOGY | Facility: CLINIC | Age: 59
End: 2019-10-30
Payer: COMMERCIAL

## 2019-10-30 VITALS
HEART RATE: 85 BPM | WEIGHT: 146.81 LBS | SYSTOLIC BLOOD PRESSURE: 113 MMHG | BODY MASS INDEX: 27.02 KG/M2 | HEIGHT: 62 IN | DIASTOLIC BLOOD PRESSURE: 77 MMHG

## 2019-10-30 DIAGNOSIS — M62.830 BACK SPASM: Primary | ICD-10-CM

## 2019-10-30 DIAGNOSIS — R79.89 ELEVATED LFTS: ICD-10-CM

## 2019-10-30 DIAGNOSIS — M05.79 RHEUMATOID ARTHRITIS INVOLVING MULTIPLE SITES WITH POSITIVE RHEUMATOID FACTOR: ICD-10-CM

## 2019-10-30 DIAGNOSIS — E55.9 VITAMIN D DEFICIENCY DISEASE: ICD-10-CM

## 2019-10-30 PROCEDURE — 3074F SYST BP LT 130 MM HG: CPT | Mod: CPTII,S$GLB,, | Performed by: STUDENT IN AN ORGANIZED HEALTH CARE EDUCATION/TRAINING PROGRAM

## 2019-10-30 PROCEDURE — 3008F PR BODY MASS INDEX (BMI) DOCUMENTED: ICD-10-PCS | Mod: CPTII,S$GLB,, | Performed by: STUDENT IN AN ORGANIZED HEALTH CARE EDUCATION/TRAINING PROGRAM

## 2019-10-30 PROCEDURE — 3078F DIAST BP <80 MM HG: CPT | Mod: CPTII,S$GLB,, | Performed by: STUDENT IN AN ORGANIZED HEALTH CARE EDUCATION/TRAINING PROGRAM

## 2019-10-30 PROCEDURE — 99214 OFFICE O/P EST MOD 30 MIN: CPT | Mod: S$GLB,,, | Performed by: STUDENT IN AN ORGANIZED HEALTH CARE EDUCATION/TRAINING PROGRAM

## 2019-10-30 PROCEDURE — 99999 PR PBB SHADOW E&M-EST. PATIENT-LVL IV: CPT | Mod: PBBFAC,,, | Performed by: STUDENT IN AN ORGANIZED HEALTH CARE EDUCATION/TRAINING PROGRAM

## 2019-10-30 PROCEDURE — 3078F PR MOST RECENT DIASTOLIC BLOOD PRESSURE < 80 MM HG: ICD-10-PCS | Mod: CPTII,S$GLB,, | Performed by: STUDENT IN AN ORGANIZED HEALTH CARE EDUCATION/TRAINING PROGRAM

## 2019-10-30 PROCEDURE — 3074F PR MOST RECENT SYSTOLIC BLOOD PRESSURE < 130 MM HG: ICD-10-PCS | Mod: CPTII,S$GLB,, | Performed by: STUDENT IN AN ORGANIZED HEALTH CARE EDUCATION/TRAINING PROGRAM

## 2019-10-30 PROCEDURE — 99999 PR PBB SHADOW E&M-EST. PATIENT-LVL IV: ICD-10-PCS | Mod: PBBFAC,,, | Performed by: STUDENT IN AN ORGANIZED HEALTH CARE EDUCATION/TRAINING PROGRAM

## 2019-10-30 PROCEDURE — 99214 PR OFFICE/OUTPT VISIT, EST, LEVL IV, 30-39 MIN: ICD-10-PCS | Mod: S$GLB,,, | Performed by: STUDENT IN AN ORGANIZED HEALTH CARE EDUCATION/TRAINING PROGRAM

## 2019-10-30 PROCEDURE — 3008F BODY MASS INDEX DOCD: CPT | Mod: CPTII,S$GLB,, | Performed by: STUDENT IN AN ORGANIZED HEALTH CARE EDUCATION/TRAINING PROGRAM

## 2019-10-30 RX ORDER — BACLOFEN 10 MG/1
5 TABLET ORAL 2 TIMES DAILY
Qty: 30 TABLET | Refills: 0 | Status: SHIPPED | OUTPATIENT
Start: 2019-10-30 | End: 2020-11-27

## 2019-10-30 RX ORDER — FOLIC ACID 1 MG/1
1 TABLET ORAL DAILY
Qty: 30 TABLET | Refills: 11 | Status: SHIPPED | OUTPATIENT
Start: 2019-10-30 | End: 2020-01-08 | Stop reason: SDUPTHER

## 2019-10-30 RX ORDER — ERGOCALCIFEROL 1.25 MG/1
50000 CAPSULE ORAL
Qty: 12 CAPSULE | Refills: 3 | Status: SHIPPED | OUTPATIENT
Start: 2019-10-30 | End: 2020-01-08 | Stop reason: SDUPTHER

## 2019-10-30 ASSESSMENT — ROUTINE ASSESSMENT OF PATIENT INDEX DATA (RAPID3)
WHEN YOU AWAKENED IN THE MORNING OVER THE LAST WEEK, PLEASE INDICATE THE AMOUNT OF TIME IT TAKES UNTIL YOU ARE AS LIMBER AS YOU WILL BE FOR THE DAY: 2-3 HRS
FATIGUE SCORE: 8
PSYCHOLOGICAL DISTRESS SCORE: 3.3
PAIN SCORE: 2
TOTAL RAPID3 SCORE: 3
PATIENT GLOBAL ASSESSMENT SCORE: 7
MDHAQ FUNCTION SCORE: 0
AM STIFFNESS SCORE: 1, YES

## 2019-10-30 NOTE — PROGRESS NOTES
I have reviewed the notes, documentation by  and I agree with her recommendations  I have made an addendum  Also suggested a muscle relaxant for the muscle spasms /which I didn't document in my addendum

## 2019-10-30 NOTE — PROGRESS NOTES
I agree with the documentation by     58 year old female with non-erosive RA    Doing great with 1 ml weekly mtx/folic acid  Will continue     LFT elevation due to tylenol     CRP much improved    Has left upper quadrant pain  Cough for a month,much improved  Mild cervical adenopathy    Off tylenol     Plan    US abdomen  LFTs recheck  UA

## 2019-11-05 ENCOUNTER — HOSPITAL ENCOUNTER (OUTPATIENT)
Dept: RADIOLOGY | Facility: HOSPITAL | Age: 59
Discharge: HOME | End: 2019-11-05
Attending: STUDENT IN AN ORGANIZED HEALTH CARE EDUCATION/TRAINING PROGRAM
Payer: COMMERCIAL

## 2019-11-05 DIAGNOSIS — M62.830 BACK SPASM: ICD-10-CM

## 2019-11-05 PROCEDURE — 76700 US ABDOMEN COMPLETE: ICD-10-PCS | Mod: 26,,, | Performed by: RADIOLOGY

## 2019-11-05 PROCEDURE — 76700 US EXAM ABDOM COMPLETE: CPT | Mod: 26,,, | Performed by: RADIOLOGY

## 2019-11-05 PROCEDURE — 76700 US EXAM ABDOM COMPLETE: CPT | Mod: TC

## 2019-11-26 ENCOUNTER — TELEPHONE (OUTPATIENT)
Dept: PHARMACY | Facility: CLINIC | Age: 59
End: 2019-11-26

## 2020-01-03 ENCOUNTER — LAB VISIT (OUTPATIENT)
Dept: LAB | Facility: HOSPITAL | Age: 60
End: 2020-01-03
Attending: STUDENT IN AN ORGANIZED HEALTH CARE EDUCATION/TRAINING PROGRAM
Payer: COMMERCIAL

## 2020-01-03 DIAGNOSIS — M05.79 RHEUMATOID ARTHRITIS INVOLVING MULTIPLE SITES WITH POSITIVE RHEUMATOID FACTOR: ICD-10-CM

## 2020-01-03 DIAGNOSIS — M19.90 OSTEOARTHRITIS, UNSPECIFIED OSTEOARTHRITIS TYPE, UNSPECIFIED SITE: ICD-10-CM

## 2020-01-03 DIAGNOSIS — Z79.60 LONG-TERM USE OF IMMUNOSUPPRESSANT MEDICATION: ICD-10-CM

## 2020-01-03 DIAGNOSIS — M13.0 POLYARTHRITIS: ICD-10-CM

## 2020-01-03 DIAGNOSIS — R79.89 ELEVATED LFTS: ICD-10-CM

## 2020-01-03 LAB
ALBUMIN SERPL BCP-MCNC: 3.6 G/DL (ref 3.5–5.2)
ALBUMIN SERPL BCP-MCNC: 3.6 G/DL (ref 3.5–5.2)
ALP SERPL-CCNC: 119 U/L (ref 55–135)
ALP SERPL-CCNC: 119 U/L (ref 55–135)
ALT SERPL W/O P-5'-P-CCNC: 40 U/L (ref 10–44)
ALT SERPL W/O P-5'-P-CCNC: 40 U/L (ref 10–44)
ANION GAP SERPL CALC-SCNC: 10 MMOL/L (ref 8–16)
ANION GAP SERPL CALC-SCNC: 10 MMOL/L (ref 8–16)
AST SERPL-CCNC: 15 U/L (ref 10–40)
AST SERPL-CCNC: 15 U/L (ref 10–40)
BASOPHILS # BLD AUTO: 0.06 K/UL (ref 0–0.2)
BASOPHILS NFR BLD: 0.7 % (ref 0–1.9)
BILIRUB SERPL-MCNC: 0.3 MG/DL (ref 0.1–1)
BILIRUB SERPL-MCNC: 0.3 MG/DL (ref 0.1–1)
BUN SERPL-MCNC: 9 MG/DL (ref 6–20)
BUN SERPL-MCNC: 9 MG/DL (ref 6–20)
CALCIUM SERPL-MCNC: 9.2 MG/DL (ref 8.7–10.5)
CALCIUM SERPL-MCNC: 9.2 MG/DL (ref 8.7–10.5)
CHLORIDE SERPL-SCNC: 109 MMOL/L (ref 95–110)
CHLORIDE SERPL-SCNC: 109 MMOL/L (ref 95–110)
CO2 SERPL-SCNC: 26 MMOL/L (ref 23–29)
CO2 SERPL-SCNC: 26 MMOL/L (ref 23–29)
CREAT SERPL-MCNC: 0.8 MG/DL (ref 0.5–1.4)
CREAT SERPL-MCNC: 0.8 MG/DL (ref 0.5–1.4)
CRP SERPL-MCNC: 10.1 MG/L (ref 0–8.2)
DIFFERENTIAL METHOD: ABNORMAL
EOSINOPHIL # BLD AUTO: 0.3 K/UL (ref 0–0.5)
EOSINOPHIL NFR BLD: 3.4 % (ref 0–8)
ERYTHROCYTE [DISTWIDTH] IN BLOOD BY AUTOMATED COUNT: 13.5 % (ref 11.5–14.5)
ERYTHROCYTE [SEDIMENTATION RATE] IN BLOOD BY WESTERGREN METHOD: 12 MM/HR (ref 0–36)
EST. GFR  (AFRICAN AMERICAN): >60 ML/MIN/1.73 M^2
EST. GFR  (AFRICAN AMERICAN): >60 ML/MIN/1.73 M^2
EST. GFR  (NON AFRICAN AMERICAN): >60 ML/MIN/1.73 M^2
EST. GFR  (NON AFRICAN AMERICAN): >60 ML/MIN/1.73 M^2
GLUCOSE SERPL-MCNC: 95 MG/DL (ref 70–110)
GLUCOSE SERPL-MCNC: 95 MG/DL (ref 70–110)
HCT VFR BLD AUTO: 43.4 % (ref 37–48.5)
HGB BLD-MCNC: 13.4 G/DL (ref 12–16)
IMM GRANULOCYTES # BLD AUTO: 0.02 K/UL (ref 0–0.04)
IMM GRANULOCYTES NFR BLD AUTO: 0.2 % (ref 0–0.5)
LYMPHOCYTES # BLD AUTO: 1.9 K/UL (ref 1–4.8)
LYMPHOCYTES NFR BLD: 23.6 % (ref 18–48)
MCH RBC QN AUTO: 30.5 PG (ref 27–31)
MCHC RBC AUTO-ENTMCNC: 30.9 G/DL (ref 32–36)
MCV RBC AUTO: 99 FL (ref 82–98)
MONOCYTES # BLD AUTO: 0.5 K/UL (ref 0.3–1)
MONOCYTES NFR BLD: 6.3 % (ref 4–15)
NEUTROPHILS # BLD AUTO: 5.4 K/UL (ref 1.8–7.7)
NEUTROPHILS NFR BLD: 65.8 % (ref 38–73)
NRBC BLD-RTO: 0 /100 WBC
PLATELET # BLD AUTO: 331 K/UL (ref 150–350)
PMV BLD AUTO: 10.7 FL (ref 9.2–12.9)
POTASSIUM SERPL-SCNC: 4.1 MMOL/L (ref 3.5–5.1)
POTASSIUM SERPL-SCNC: 4.1 MMOL/L (ref 3.5–5.1)
PROT SERPL-MCNC: 6.7 G/DL (ref 6–8.4)
PROT SERPL-MCNC: 6.7 G/DL (ref 6–8.4)
RBC # BLD AUTO: 4.4 M/UL (ref 4–5.4)
SODIUM SERPL-SCNC: 145 MMOL/L (ref 136–145)
SODIUM SERPL-SCNC: 145 MMOL/L (ref 136–145)
WBC # BLD AUTO: 8.15 K/UL (ref 3.9–12.7)

## 2020-01-03 PROCEDURE — 86140 C-REACTIVE PROTEIN: CPT

## 2020-01-03 PROCEDURE — 36415 COLL VENOUS BLD VENIPUNCTURE: CPT | Mod: PO

## 2020-01-03 PROCEDURE — 85025 COMPLETE CBC W/AUTO DIFF WBC: CPT

## 2020-01-03 PROCEDURE — 80053 COMPREHEN METABOLIC PANEL: CPT

## 2020-01-03 PROCEDURE — 85652 RBC SED RATE AUTOMATED: CPT

## 2020-01-06 ENCOUNTER — PATIENT OUTREACH (OUTPATIENT)
Dept: ADMINISTRATIVE | Facility: OTHER | Age: 60
End: 2020-01-06

## 2020-01-08 ENCOUNTER — OFFICE VISIT (OUTPATIENT)
Dept: RHEUMATOLOGY | Facility: CLINIC | Age: 60
End: 2020-01-08
Payer: COMMERCIAL

## 2020-01-08 ENCOUNTER — TELEPHONE (OUTPATIENT)
Dept: RHEUMATOLOGY | Facility: HOSPITAL | Age: 60
End: 2020-01-08

## 2020-01-08 VITALS
BODY MASS INDEX: 27.22 KG/M2 | HEIGHT: 62 IN | HEART RATE: 67 BPM | WEIGHT: 147.94 LBS | DIASTOLIC BLOOD PRESSURE: 67 MMHG | SYSTOLIC BLOOD PRESSURE: 99 MMHG

## 2020-01-08 DIAGNOSIS — M19.90 OSTEOARTHRITIS, UNSPECIFIED OSTEOARTHRITIS TYPE, UNSPECIFIED SITE: ICD-10-CM

## 2020-01-08 DIAGNOSIS — D84.9 IMMUNOSUPPRESSION: ICD-10-CM

## 2020-01-08 DIAGNOSIS — E55.9 VITAMIN D DEFICIENCY DISEASE: ICD-10-CM

## 2020-01-08 DIAGNOSIS — Z79.60 LONG-TERM USE OF IMMUNOSUPPRESSANT MEDICATION: ICD-10-CM

## 2020-01-08 DIAGNOSIS — M05.79 RHEUMATOID ARTHRITIS INVOLVING MULTIPLE SITES WITH POSITIVE RHEUMATOID FACTOR: Primary | ICD-10-CM

## 2020-01-08 PROCEDURE — 99999 PR PBB SHADOW E&M-EST. PATIENT-LVL III: ICD-10-PCS | Mod: PBBFAC,,, | Performed by: STUDENT IN AN ORGANIZED HEALTH CARE EDUCATION/TRAINING PROGRAM

## 2020-01-08 PROCEDURE — 3074F SYST BP LT 130 MM HG: CPT | Mod: CPTII,S$GLB,, | Performed by: STUDENT IN AN ORGANIZED HEALTH CARE EDUCATION/TRAINING PROGRAM

## 2020-01-08 PROCEDURE — 3078F PR MOST RECENT DIASTOLIC BLOOD PRESSURE < 80 MM HG: ICD-10-PCS | Mod: CPTII,S$GLB,, | Performed by: STUDENT IN AN ORGANIZED HEALTH CARE EDUCATION/TRAINING PROGRAM

## 2020-01-08 PROCEDURE — 99999 PR PBB SHADOW E&M-EST. PATIENT-LVL III: CPT | Mod: PBBFAC,,, | Performed by: STUDENT IN AN ORGANIZED HEALTH CARE EDUCATION/TRAINING PROGRAM

## 2020-01-08 PROCEDURE — 99214 OFFICE O/P EST MOD 30 MIN: CPT | Mod: S$GLB,,, | Performed by: STUDENT IN AN ORGANIZED HEALTH CARE EDUCATION/TRAINING PROGRAM

## 2020-01-08 PROCEDURE — 3078F DIAST BP <80 MM HG: CPT | Mod: CPTII,S$GLB,, | Performed by: STUDENT IN AN ORGANIZED HEALTH CARE EDUCATION/TRAINING PROGRAM

## 2020-01-08 PROCEDURE — 99214 PR OFFICE/OUTPT VISIT, EST, LEVL IV, 30-39 MIN: ICD-10-PCS | Mod: S$GLB,,, | Performed by: STUDENT IN AN ORGANIZED HEALTH CARE EDUCATION/TRAINING PROGRAM

## 2020-01-08 PROCEDURE — 3008F PR BODY MASS INDEX (BMI) DOCUMENTED: ICD-10-PCS | Mod: CPTII,S$GLB,, | Performed by: STUDENT IN AN ORGANIZED HEALTH CARE EDUCATION/TRAINING PROGRAM

## 2020-01-08 PROCEDURE — 3008F BODY MASS INDEX DOCD: CPT | Mod: CPTII,S$GLB,, | Performed by: STUDENT IN AN ORGANIZED HEALTH CARE EDUCATION/TRAINING PROGRAM

## 2020-01-08 PROCEDURE — 3074F PR MOST RECENT SYSTOLIC BLOOD PRESSURE < 130 MM HG: ICD-10-PCS | Mod: CPTII,S$GLB,, | Performed by: STUDENT IN AN ORGANIZED HEALTH CARE EDUCATION/TRAINING PROGRAM

## 2020-01-08 RX ORDER — FOLIC ACID 1 MG/1
1 TABLET ORAL DAILY
Qty: 30 TABLET | Refills: 11 | Status: SHIPPED | OUTPATIENT
Start: 2020-01-08 | End: 2020-08-24

## 2020-01-08 RX ORDER — HYDROXYCHLOROQUINE SULFATE 200 MG/1
200 TABLET, FILM COATED ORAL DAILY
Qty: 60 TABLET | Refills: 2 | Status: SHIPPED | OUTPATIENT
Start: 2020-01-08 | End: 2020-01-08

## 2020-01-08 RX ORDER — DICLOFENAC SODIUM 10 MG/G
2 GEL TOPICAL 3 TIMES DAILY
Qty: 1 TUBE | Refills: 1 | Status: SHIPPED | OUTPATIENT
Start: 2020-01-08 | End: 2021-02-05 | Stop reason: SDUPTHER

## 2020-01-08 RX ORDER — IBUPROFEN 200 MG
TABLET ORAL
Refills: 0 | COMMUNITY
Start: 2020-01-08 | End: 2020-10-06 | Stop reason: ALTCHOICE

## 2020-01-08 RX ORDER — PREDNISONE 10 MG/1
10 TABLET ORAL DAILY
Qty: 7 TABLET | Refills: 0 | Status: SHIPPED | OUTPATIENT
Start: 2020-01-08 | End: 2020-01-15

## 2020-01-08 RX ORDER — ERGOCALCIFEROL 1.25 MG/1
50000 CAPSULE ORAL
Qty: 12 CAPSULE | Refills: 3 | Status: SHIPPED | OUTPATIENT
Start: 2020-01-08 | End: 2020-04-01

## 2020-01-08 RX ORDER — SULFASALAZINE 500 MG/1
1000 TABLET, DELAYED RELEASE ORAL 2 TIMES DAILY
Qty: 120 TABLET | Refills: 2 | Status: SHIPPED | OUTPATIENT
Start: 2020-01-08 | End: 2020-02-07

## 2020-01-08 ASSESSMENT — ROUTINE ASSESSMENT OF PATIENT INDEX DATA (RAPID3)
PSYCHOLOGICAL DISTRESS SCORE: 0
FATIGUE SCORE: 7.5
AM STIFFNESS SCORE: 1, YES
PAIN SCORE: 2.5
MDHAQ FUNCTION SCORE: 0
TOTAL RAPID3 SCORE: 2.5
PATIENT GLOBAL ASSESSMENT SCORE: 5

## 2020-01-08 NOTE — TELEPHONE ENCOUNTER
Received message from [facility] about medication confusion.  Called and message pharmacist - clarified.  Patient is to start sulfasalazine (not HCQ) at this time time.  Continue MTX.     Pharmacist will notify patient.

## 2020-01-08 NOTE — PROGRESS NOTES
I have reviewed the notes,documentation by  and I agree with her recommendations and I have made an addendum to her note

## 2020-01-08 NOTE — PROGRESS NOTES
I agree with the documentation by   58 year old female with non-erosive RA    On mtx 1 ml weekly sc and folic acid    Now comes with foot pain and high CRP  Suspect synovitis left foot  Tender MTPs b/l feet  Prior OA noted    Will add ssz and titrate to 1000 mg bid and monitor LFTs  Prior elevated LFTs have normalised with no concerns after removing tylenol     voltaren gel

## 2020-01-08 NOTE — PROGRESS NOTES
RHEUMATOLOGY CLINIC FOLLOW UP VISIT    Chief complaints:- follow up for RA       HPI:-  Darlyn Ibarra a 59 y.o. pleasant female with history of non-erosive migratory arthritis (+ and +).  Patient here today with complaints of LE (L>R) feet pain.      Patient was started on MTX 15mg Qweekly in May 2018 following findings of synovitis of the hands and ankles.      Today still complaining of bilateral feet and ankle pain (L>R).  Hand discomfort had improved with MTX but still aching.      After starting MTX, pain is better controlled.  Prior to the starting of MTX, patient was unable to walk.  Now, she is able to ambulate without assistance.  Pain is more a dull sensation but persistent and constant.   Tolerating medication well without any complications.  Compliant with all home medications.       Associated symptoms include bilateral hand and feet swelling.  AM stiffness of a couple of hours (feet/ankles).  Patient continues to feel generalized fatigue/maliase after ~1 hour of ambulation.     Interval History  Tolerating and compliant with MTX SQ and folic acid. Currently on 1mg of SQ MTX and feels significant improvement of her arthralgia - especially of bilateral hand.  Complaining of bilateral feet pain especially after work.  Per patient, she is unable to walk/do too much due to the pain.      AM stiffness x few mins (improvement since last visit).    Took some baclofen which provided some alleviation of muscle spasm during the jc time when patient was very active.  Baclofen caused some drowsiness.      Lower back pain resolved.   No longer taking tylenol.  Abdominal pain resolved.     Denies any fever/chills, recent trauma, N/V, mouth ulcer, GI upset, diarrhea/constipation, urinary symptoms, SOB, or CP    Review of Systems   Constitutional: Negative for chills, diaphoresis, fever, malaise/fatigue and weight loss.   HENT: Negative for congestion, ear discharge, ear  "pain, hearing loss, nosebleeds, sinus pain and tinnitus.    Eyes: Negative for blurred vision, photophobia, pain, discharge and redness.   Respiratory: Negative for cough, hemoptysis, sputum production, shortness of breath, wheezing and stridor.    Cardiovascular: Negative for chest pain, palpitations, orthopnea, claudication, leg swelling and PND.   Gastrointestinal: Negative for abdominal pain, constipation, diarrhea, heartburn, nausea and vomiting.   Genitourinary: Negative for dysuria, frequency, hematuria and urgency.   Musculoskeletal: Positive for joint pain. Negative for back pain, myalgias and neck pain.   Skin: Negative for rash.   Neurological: Negative for dizziness, tingling, tremors, weakness and headaches.   Endo/Heme/Allergies: Does not bruise/bleed easily.   Psychiatric/Behavioral: Negative for depression, hallucinations and suicidal ideas. The patient is not nervous/anxious and does not have insomnia.        Past Medical History:   Diagnosis Date    Arthritis     Depression     Early dry stage nonexudative age-related macular degeneration of both eyes 8/19/2019    Elevated alkaline phosphatase level     Hyperlipidemia     Nuclear sclerosis of both eyes 8/19/2019    Osteopenia     Personal history of female infertility     Respiratory distress     " stopped Breathing" after 2 pain meds were given in a close time frame to one another.    Thyroid disease     multinodular goiter    Vitamin D deficiency disease        Past Surgical History:   Procedure Laterality Date    BREAST BIOPSY Left     over 10 yrs ago    BREAST SURGERY      benign biopsy on left    COLONOSCOPY N/A 6/7/2019    Procedure: COLONOSCOPY;  Surgeon: Juilan Waldrop MD;  Location: [facility] ([address] [place] [address]);  Service: Endoscopy;  Laterality: N/A;  2nd floor - per anethesia note from Pt hysterectomy 2013, Pt possible difficult intubation - ERW    HYSTERECTOMY      LAPAROSCOPY W/ MINI-LAPAROTOMY      for fertility issues    " "OOPHORECTOMY      DE REMOVAL OF OVARY/TUBE(S)      RECTAL SURGERY      excess skin growth removal    supracervical hysterectomy      TONSILLECTOMY          Social History     Tobacco Use    Smoking status: Former Smoker     Packs/day: 2.50     Years: 30.00     Pack years: 75.00    Smokeless tobacco: Never Used   Substance Use Topics    Alcohol use: No     Frequency: Never     Comment: Rare    Drug use: No       Family History   Problem Relation Age of Onset    Breast cancer Mother     Hyperlipidemia Mother     Macular degeneration Mother     Emphysema Father     No Known Problems Sister     No Known Problems Brother     Macular degeneration Maternal Aunt     No Known Problems Maternal Uncle     No Known Problems Paternal Aunt     No Known Problems Paternal Uncle     No Known Problems Maternal Grandmother     No Known Problems Maternal Grandfather     No Known Problems Paternal Grandmother     No Known Problems Paternal Grandfather     Colon cancer Neg Hx     Ovarian cancer Neg Hx     Melanoma Neg Hx     Amblyopia Neg Hx     Blindness Neg Hx     Cancer Neg Hx     Cataracts Neg Hx     Diabetes Neg Hx     Hypertension Neg Hx     Retinal detachment Neg Hx     Strabismus Neg Hx     Stroke Neg Hx     Thyroid disease Neg Hx     Glaucoma Neg Hx        Review of patient's allergies indicates:   Allergen Reactions    Hydromorphone Shortness Of Breath    Percocet  [oxycodone-acetaminophen] Itching and Nausea Only    Vicodin  [hydrocodone-acetaminophen] Nausea Only and Nausea And Vomiting           Physical examination:-    Vitals:    01/08/20 0828   BP: 99/67   Pulse: 67   Weight: 67.1 kg (147 lb 14.9 oz)   Height: 5' 2" (1.575 m)   PainSc:   2       Physical Exam   Constitutional: She is oriented to person, place, and time and well-developed, well-nourished, and in no distress.   HENT:   Head: Normocephalic and atraumatic.   No mouth ulcers   Eyes: Pupils are equal, round, and reactive " to light. Conjunctivae are normal.   Neck: Normal range of motion. Neck supple.   Cardiovascular: Normal rate, regular rhythm and normal heart sounds.   Pulmonary/Chest: Effort normal and breath sounds normal.   Abdominal: Soft. Bowel sounds are normal. She exhibits no distension. There is no tenderness.   Musculoskeletal: Normal range of motion. She exhibits tenderness.   No signs of enthesis   Bilateral feet (2nd/3rd MCP) swelling/synovitis.  +Squeeze test   ROM complete in all joints     Neurological: She is alert and oriented to person, place, and time. Gait normal.   Skin: Skin is warm and dry.   No rashes   Psychiatric: Mood, memory, affect and judgment normal.       Labs:-  Results for BRITTNEY REICH (MRN [id number]) as of 1/8/2020 07:59   Ref. Range 10/23/2019 08:45 11/5/2019 07:57 11/5/2019 08:02 1/3/2020 08:15 1/3/2020 08:15   RBC Latest Ref Range: 4.00 - 5.40 M/uL 4.22   4.40    Hemoglobin Latest Ref Range: 12.0 - 16.0 g/dL 12.9   13.4    Hematocrit Latest Ref Range: 37.0 - 48.5 % 40.6   43.4    MCV Latest Ref Range: 82 - 98 fL 96   99 (H)    MCH Latest Ref Range: 27.0 - 31.0 pg 30.6   30.5    MCHC Latest Ref Range: 32.0 - 36.0 g/dL 31.8 (L)   30.9 (L)    RDW Latest Ref Range: 11.5 - 14.5 % 14.4   13.5    Platelets Latest Ref Range: 150 - 350 K/uL 331   331    MPV Latest Ref Range: 9.2 - 12.9 fL 10.7   10.7    Gran% Latest Ref Range: 38.0 - 73.0 % 61.4   65.8    Gran # (ANC) Latest Ref Range: 1.8 - 7.7 K/uL 4.0   5.4    Lymph% Latest Ref Range: 18.0 - 48.0 % 26.6   23.6    Lymph # Latest Ref Range: 1.0 - 4.8 K/uL 1.7   1.9    Mono% Latest Ref Range: 4.0 - 15.0 % 7.8   6.3    Mono # Latest Ref Range: 0.3 - 1.0 K/uL 0.5   0.5    Eosinophil% Latest Ref Range: 0.0 - 8.0 % 3.1   3.4    Eos # Latest Ref Range: 0.0 - 0.5 K/uL 0.2   0.3    Basophil% Latest Ref Range: 0.0 - 1.9 % 0.8   0.7    Baso # Latest Ref Range: 0.00 - 0.20 K/uL 0.05   0.06    nRBC Latest Ref Range: 0 /100 WBC 0   0    Differential Method  Unknown Automated   Automated    Immature Grans (Abs) Latest Ref Range: 0.00 - 0.04 K/uL 0.02   0.02    Immature Granulocytes Latest Ref Range: 0.0 - 0.5 % 0.3   0.2    Sed Rate Latest Ref Range: 0 - 36 mm/Hr    12    Sodium Latest Ref Range: 136 - 145 mmol/L 144  144 145 145   Potassium Latest Ref Range: 3.5 - 5.1 mmol/L 4.0  4.3 4.1 4.1   Chloride Latest Ref Range: 95 - 110 mmol/L 108  108 109 109   CO2 Latest Ref Range: 23 - 29 mmol/L 29  29 26 26   Anion Gap Latest Ref Range: 8 - 16 mmol/L 7 (L)  7 (L) 10 10   BUN, Bld Latest Ref Range: 6 - 20 mg/dL 7  9 9 9   Creatinine Latest Ref Range: 0.5 - 1.4 mg/dL 0.8  0.8 0.8 0.8   eGFR if non African American Latest Ref Range: >60 mL/min/1.73 m^2 >60.0  >60 >60.0 >60.0   eGFR if African American Latest Ref Range: >60 mL/min/1.73 m^2 >60.0  >60 >60.0 >60.0   Glucose Latest Ref Range: 70 - 110 mg/dL 93  96 95 95   Calcium Latest Ref Range: 8.7 - 10.5 mg/dL 9.2  9.2 9.2 9.2   Alkaline Phosphatase Latest Ref Range: 55 - 135 U/L 127  127 119 119   PROTEIN TOTAL Latest Ref Range: 6.0 - 8.4 g/dL 6.7  6.7 6.7 6.7   Albumin Latest Ref Range: 3.5 - 5.2 g/dL 3.7  3.9 3.6 3.6   BILIRUBIN TOTAL Latest Ref Range: 0.1 - 1.0 mg/dL 0.4  0.4 0.3 0.3   AST Latest Ref Range: 10 - 40 U/L 44 (H)  23 15 15   ALT Latest Ref Range: 10 - 44 U/L 88 (H)  51 (H) 40 40   CRP Latest Ref Range: 0.0 - 8.2 mg/L 11.8 (H)   10.1 (H)    Specimen UA Unknown  Urine, Clean Catch      Color, UA Latest Ref Range: Yellow, Straw, [place]   Yellow      Appearance, UA Latest Ref Range: Clear   Clear      Specific [place], UA Latest Ref Range: 1.005 - 1.030   1.015      pH, UA Latest Ref Range: 5.0 - 8.0   5.0      Protein, UA Latest Ref Range: Negative   Negative      Glucose, UA Latest Ref Range: Negative   Negative      Ketones, UA Latest Ref Range: Negative   Negative      Occult Blood UA Latest Ref Range: Negative   Negative      NITRITE UA Latest Ref Range: Negative   Negative      UROBILINOGEN UA Latest Ref  "Range: <2.0 EU/dL  Negative      Bilirubin (UA) Latest Ref Range: Negative   Negative      Leukocytes, UA Latest Ref Range: Negative   Negative        Imaging  Independent visualization of images done.   Xray of hand bilateral - no fracture or erosion  X ray foot - bilateral foot mild DJD  Xray ankle - normal. No fracture or erosion    Medication List with Changes/Refills   New Medications    DICLOFENAC SODIUM (VOLTAREN) 1 % GEL    Apply 2 g topically 3 (three) times daily.   Current Medications    ALBUTEROL (PROAIR HFA) 90 MCG/ACTUATION INHALER    Inhale 2 puffs into the lungs every 4 (four) hours as needed. 2 HFA Aerosol Inhaler Inhalation Every 4-6 hours    BACLOFEN (LIORESAL) 10 MG TABLET    Take 0.5 tablets (5 mg total) by mouth 2 (two) times daily. Start with 0.5 tablets (5mg) in the evening first.  If needed, take it in the daytime    LEVOTHYROXINE (SYNTHROID) 100 MCG TABLET    Take 1 tablet (100 mcg total) by mouth once daily. Fill brand name    MELOXICAM (MOBIC) 15 MG TABLET    TAKE 1 TABLET(15 MG) BY MOUTH EVERY DAY AS NEEDED   Changed and/or Refilled Medications    Modified Medication Previous Medication    ERGOCALCIFEROL (ERGOCALCIFEROL) 50,000 UNIT CAP ergocalciferol (ERGOCALCIFEROL) 50,000 unit Cap       Take 1 capsule (50,000 Units total) by mouth every 7 days.    Take 1 capsule (50,000 Units total) by mouth every 7 days.    FOLIC ACID (FOLVITE) 1 MG TABLET folic acid (FOLVITE) 1 MG tablet       Take 1 tablet (1 mg total) by mouth once daily.    Take 1 tablet (1 mg total) by mouth once daily.    METHOTREXATE, PF, (OTREXUP, PF,) 25 MG/0.4 ML ATIN methotrexate, PF, (OTREXUP, PF,) 25 mg/0.4 mL AtIn       Inject 0.4 mLs (25 mg total) into the skin every 7 days.    Inject 0.4 mLs (25 mg total) into the skin every 7 days.    SYRINGE AND NEEDLE,INSULIN,1ML (INSULIN SYRINGE-NEEDLE U-100) 1 ML 29 GAUGE X 7/16" SYRG syringe and needle,insulin,1mL (INSULIN SYRINGE-NEEDLE U-100) 1 mL 29 gauge x 7/16" Syrg       U " TO INJECT 1ML ONCE A WEEK    U TO INJECT 0.6 ML ONCE A WEEK   Discontinued Medications    DICLOFENAC (VOLTAREN) 75 MG EC TABLET    Take 1 tablet (75 mg total) by mouth 2 (two) times daily as needed.       Assessment/Plans:-   59 y.o. pleasant female with history of non-erosive migratory arthritis (+ and +).  Patient was started on MTX 25mg Qweekly (Rosuvo) and daily folic acid . Tolerating medication well without any complication.  Compliant with medications (takes MTX QSunday with daily folic acid).      DAS28 CRP - 1.74 (mild) - CRP improving (from 11.8-->10.1)    LFTs normalized.  U/S abdomen showed L sided non obstruction renal calculus.  Abdominal pain had resolved.     Despite improvement in both ESR and CRP, patient continues to bilateral feet synovitis in the MCP area.  Concern about +RA activity vs DJD (mild - seen on xray).  Will need addition of sulfasalazine to her MTX.       Plan  - Continue MTX 1mL (25mg Qweekly) Rasuvo - refill provided.  - Continue folate acid daily - refill provided   - Vit D 50,000 IU Qweekly - refill provided   - Start Sulfasalazine 500mg BID x 7 days and then increase 1000mg BID thereafter  - Prednisone 10mg x 7 days   - Voltaren gel in bilateral feet TID daily   - CBC and CMP in 4 weeks - concern about liver toxicity with the addition of sulfasalazine.   - Repeat CBC, CMP, ESR/CRP prior to the next visit            # Follow up in about 3 months (around 4/8/2020).

## 2020-01-08 NOTE — PATIENT INSTRUCTIONS
Start sulfasalazine 500mg (1 tablet) twice a day x 7 days and then increase to 1000mg (2 tablets) twice a day (total of 4 tablets a day)    Start prednisone 10mg every day x 7 days    Voltaren gel on bilateral feet (up to 3x/day)    Labs to be done in 4-6 weeks and then again in 3 months.

## 2020-01-11 ENCOUNTER — CLINICAL SUPPORT (OUTPATIENT)
Dept: URGENT CARE | Facility: CLINIC | Age: 60
End: 2020-01-11
Payer: COMMERCIAL

## 2020-01-11 DIAGNOSIS — Z23 FLU VACCINE NEED: Primary | ICD-10-CM

## 2020-01-11 PROCEDURE — 90471 IMMUNIZATION ADMIN: CPT | Mod: S$GLB,,, | Performed by: PHYSICIAN ASSISTANT

## 2020-01-11 PROCEDURE — 90688 FLU VACCINE (QUAD) GREATER THAN OR EQUAL TO 3YO W/ PRESERVATIVE: ICD-10-PCS | Mod: S$GLB,,, | Performed by: PHYSICIAN ASSISTANT

## 2020-01-11 PROCEDURE — 90471 FLU VACCINE (QUAD) GREATER THAN OR EQUAL TO 3YO W/ PRESERVATIVE: ICD-10-PCS | Mod: S$GLB,,, | Performed by: PHYSICIAN ASSISTANT

## 2020-01-11 PROCEDURE — 90688 IIV4 VACCINE SPLT 0.5 ML IM: CPT | Mod: S$GLB,,, | Performed by: PHYSICIAN ASSISTANT

## 2020-01-14 ENCOUNTER — TELEPHONE (OUTPATIENT)
Dept: RHEUMATOLOGY | Facility: CLINIC | Age: 60
End: 2020-01-14

## 2020-01-14 NOTE — TELEPHONE ENCOUNTER
Patient called to notify us that her current pharmacy does not have complete # sulfasalazine pills.  They are uncertain when they will have the remainder of the medication.    Patient told to call around to her nearby pharmacy to see who else will have the medication.      She will update pharmacy when she know where she can pick it up.

## 2020-01-20 ENCOUNTER — TELEPHONE (OUTPATIENT)
Dept: PHARMACY | Facility: CLINIC | Age: 60
End: 2020-01-20

## 2020-02-14 ENCOUNTER — TELEPHONE (OUTPATIENT)
Dept: PHARMACY | Facility: CLINIC | Age: 60
End: 2020-02-14

## 2020-03-10 ENCOUNTER — TELEPHONE (OUTPATIENT)
Dept: PHARMACY | Facility: CLINIC | Age: 60
End: 2020-03-10

## 2020-03-10 NOTE — TELEPHONE ENCOUNTER
Confirmed shipment of Otrexup 3/11 to arrive to patient home on 3/12. Patient reported 1 dose on hand for next dose due 3/15 and no missed doses in the past month. $0 copay (004).

## 2020-04-01 ENCOUNTER — LAB VISIT (OUTPATIENT)
Dept: LAB | Facility: HOSPITAL | Age: 60
End: 2020-04-01
Attending: STUDENT IN AN ORGANIZED HEALTH CARE EDUCATION/TRAINING PROGRAM
Payer: COMMERCIAL

## 2020-04-01 DIAGNOSIS — M19.90 OSTEOARTHRITIS, UNSPECIFIED OSTEOARTHRITIS TYPE, UNSPECIFIED SITE: ICD-10-CM

## 2020-04-01 DIAGNOSIS — E55.9 VITAMIN D DEFICIENCY DISEASE: ICD-10-CM

## 2020-04-01 DIAGNOSIS — M05.79 RHEUMATOID ARTHRITIS INVOLVING MULTIPLE SITES WITH POSITIVE RHEUMATOID FACTOR: ICD-10-CM

## 2020-04-01 DIAGNOSIS — Z79.60 LONG-TERM USE OF IMMUNOSUPPRESSANT MEDICATION: ICD-10-CM

## 2020-04-01 DIAGNOSIS — M13.0 POLYARTHRITIS: ICD-10-CM

## 2020-04-01 LAB
25(OH)D3+25(OH)D2 SERPL-MCNC: 30 NG/ML (ref 30–96)
ALBUMIN SERPL BCP-MCNC: 3.6 G/DL (ref 3.5–5.2)
ALP SERPL-CCNC: 131 U/L (ref 55–135)
ALT SERPL W/O P-5'-P-CCNC: 38 U/L (ref 10–44)
ANION GAP SERPL CALC-SCNC: 8 MMOL/L (ref 8–16)
AST SERPL-CCNC: 24 U/L (ref 10–40)
BASOPHILS # BLD AUTO: 0.06 K/UL (ref 0–0.2)
BASOPHILS NFR BLD: 0.9 % (ref 0–1.9)
BILIRUB SERPL-MCNC: 0.7 MG/DL (ref 0.1–1)
BUN SERPL-MCNC: 8 MG/DL (ref 6–20)
CALCIUM SERPL-MCNC: 9.5 MG/DL (ref 8.7–10.5)
CHLORIDE SERPL-SCNC: 106 MMOL/L (ref 95–110)
CO2 SERPL-SCNC: 29 MMOL/L (ref 23–29)
CREAT SERPL-MCNC: 0.8 MG/DL (ref 0.5–1.4)
CRP SERPL-MCNC: 6.3 MG/L (ref 0–8.2)
DIFFERENTIAL METHOD: ABNORMAL
EOSINOPHIL # BLD AUTO: 0.2 K/UL (ref 0–0.5)
EOSINOPHIL NFR BLD: 2.6 % (ref 0–8)
ERYTHROCYTE [DISTWIDTH] IN BLOOD BY AUTOMATED COUNT: 13.4 % (ref 11.5–14.5)
ERYTHROCYTE [SEDIMENTATION RATE] IN BLOOD BY WESTERGREN METHOD: 16 MM/HR (ref 0–36)
EST. GFR  (AFRICAN AMERICAN): >60 ML/MIN/1.73 M^2
EST. GFR  (NON AFRICAN AMERICAN): >60 ML/MIN/1.73 M^2
GLUCOSE SERPL-MCNC: 99 MG/DL (ref 70–110)
HCT VFR BLD AUTO: 45.1 % (ref 37–48.5)
HGB BLD-MCNC: 13.5 G/DL (ref 12–16)
IMM GRANULOCYTES # BLD AUTO: 0.01 K/UL (ref 0–0.04)
IMM GRANULOCYTES NFR BLD AUTO: 0.2 % (ref 0–0.5)
LYMPHOCYTES # BLD AUTO: 1.7 K/UL (ref 1–4.8)
LYMPHOCYTES NFR BLD: 25.9 % (ref 18–48)
MCH RBC QN AUTO: 29.9 PG (ref 27–31)
MCHC RBC AUTO-ENTMCNC: 29.9 G/DL (ref 32–36)
MCV RBC AUTO: 100 FL (ref 82–98)
MONOCYTES # BLD AUTO: 0.4 K/UL (ref 0.3–1)
MONOCYTES NFR BLD: 5.8 % (ref 4–15)
NEUTROPHILS # BLD AUTO: 4.3 K/UL (ref 1.8–7.7)
NEUTROPHILS NFR BLD: 64.6 % (ref 38–73)
NRBC BLD-RTO: 0 /100 WBC
PLATELET # BLD AUTO: 319 K/UL (ref 150–350)
PMV BLD AUTO: 10.6 FL (ref 9.2–12.9)
POTASSIUM SERPL-SCNC: 3.8 MMOL/L (ref 3.5–5.1)
PROT SERPL-MCNC: 6.8 G/DL (ref 6–8.4)
RBC # BLD AUTO: 4.52 M/UL (ref 4–5.4)
SODIUM SERPL-SCNC: 143 MMOL/L (ref 136–145)
WBC # BLD AUTO: 6.57 K/UL (ref 3.9–12.7)

## 2020-04-01 PROCEDURE — 82306 VITAMIN D 25 HYDROXY: CPT

## 2020-04-01 PROCEDURE — 85652 RBC SED RATE AUTOMATED: CPT

## 2020-04-01 PROCEDURE — 80053 COMPREHEN METABOLIC PANEL: CPT

## 2020-04-01 PROCEDURE — 36415 COLL VENOUS BLD VENIPUNCTURE: CPT | Mod: PO

## 2020-04-01 PROCEDURE — 86140 C-REACTIVE PROTEIN: CPT

## 2020-04-01 PROCEDURE — 85025 COMPLETE CBC W/AUTO DIFF WBC: CPT

## 2020-04-03 ENCOUNTER — PATIENT MESSAGE (OUTPATIENT)
Dept: RHEUMATOLOGY | Facility: CLINIC | Age: 60
End: 2020-04-03

## 2020-04-07 ENCOUNTER — TELEPHONE (OUTPATIENT)
Dept: PHARMACY | Facility: CLINIC | Age: 60
End: 2020-04-07

## 2020-04-07 ENCOUNTER — PATIENT OUTREACH (OUTPATIENT)
Dept: ADMINISTRATIVE | Facility: OTHER | Age: 60
End: 2020-04-07

## 2020-04-07 DIAGNOSIS — Z12.31 ENCOUNTER FOR SCREENING MAMMOGRAM FOR MALIGNANT NEOPLASM OF BREAST: Primary | ICD-10-CM

## 2020-04-08 ENCOUNTER — OFFICE VISIT (OUTPATIENT)
Dept: RHEUMATOLOGY | Facility: CLINIC | Age: 60
End: 2020-04-08
Payer: COMMERCIAL

## 2020-04-08 DIAGNOSIS — M13.0 POLYARTHRITIS: ICD-10-CM

## 2020-04-08 DIAGNOSIS — Z79.60 LONG-TERM USE OF IMMUNOSUPPRESSANT MEDICATION: ICD-10-CM

## 2020-04-08 DIAGNOSIS — D84.9 IMMUNOSUPPRESSION: ICD-10-CM

## 2020-04-08 DIAGNOSIS — M05.79 RHEUMATOID ARTHRITIS INVOLVING MULTIPLE SITES WITH POSITIVE RHEUMATOID FACTOR: Primary | ICD-10-CM

## 2020-04-08 PROCEDURE — 99214 OFFICE O/P EST MOD 30 MIN: CPT | Mod: 95,,, | Performed by: STUDENT IN AN ORGANIZED HEALTH CARE EDUCATION/TRAINING PROGRAM

## 2020-04-08 PROCEDURE — 99214 PR OFFICE/OUTPT VISIT, EST, LEVL IV, 30-39 MIN: ICD-10-PCS | Mod: 95,,, | Performed by: STUDENT IN AN ORGANIZED HEALTH CARE EDUCATION/TRAINING PROGRAM

## 2020-04-08 RX ORDER — PREDNISONE 10 MG/1
15 TABLET ORAL DAILY
Qty: 11 TABLET | Refills: 0 | Status: SHIPPED | OUTPATIENT
Start: 2020-04-08 | End: 2020-04-15

## 2020-04-08 RX ORDER — SULFASALAZINE 500 MG/1
1000 TABLET, DELAYED RELEASE ORAL 2 TIMES DAILY
Qty: 120 TABLET | Refills: 2 | Status: SHIPPED | OUTPATIENT
Start: 2020-04-08 | End: 2020-05-08

## 2020-04-08 ASSESSMENT — ROUTINE ASSESSMENT OF PATIENT INDEX DATA (RAPID3)
PATIENT GLOBAL ASSESSMENT SCORE: 7
PSYCHOLOGICAL DISTRESS SCORE: 2.2
PAIN SCORE: 2
MDHAQ FUNCTION SCORE: .4
TOTAL RAPID3 SCORE: 3.44
FATIGUE SCORE: 2
WHEN YOU AWAKENED IN THE MORNING OVER THE LAST WEEK, PLEASE INDICATE THE AMOUNT OF TIME IT TAKES UNTIL YOU ARE AS LIMBER AS YOU WILL BE FOR THE DAY: 2
AM STIFFNESS SCORE: 1, YES

## 2020-04-08 NOTE — PROGRESS NOTES
Rapid3 Question Responses and Scores 4/7/2020   MDHAQ Score 0.4   Psychologic Score 2.2   Pain Score 2   When you awakened in the morning OVER THE LAST WEEK, did you feel stiff? Yes   If Yes, please indicate the number of hours until you are as limber as you will be for the day 2   Fatigue Score 2   Global Health Score 7   RAPID3 Score 3.44

## 2020-04-08 NOTE — PROGRESS NOTES
I have personally taken the history  to verify the fellow's notation, and I agree with the impression and plan stated.      She has persistent LE/foot sx that warrant addition of SSZ to MTX (has eye issues that discourage HCQ use)

## 2020-04-08 NOTE — PROGRESS NOTES
The patient location is: office   The chief complaint leading to consultation is: RA management   Visit type: Virtual visit with synchronous audio and video  Total time spent with patient: 41 minutes  Each patient to whom he or she provides medical services by telemedicine is:  (1) informed of the relationship between the physician and patient and the respective role of any other health care provider with respect to management of the patient; and (2) notified that he or she may decline to receive medical services by telemedicine and may withdraw from such care at any time.    Notes: Patient was unable to obtain sulfasalazine due to shortage in pharmacy.  Did not follow up.  Currently on MTX 25mg SQ Qweekly (every Sunday) and daily folic acid. Continues to have bilateral hand and dorsal feet synovitis and weakness.  Unable to perform normal daily function  - ie. Unable to open jars, etc.    Denies any N/V, fever/chills, abdominal pain, mouth ulcers, alopecia                             RHEUMATOLOGY CLINIC FOLLOW UP VISIT    Chief complaints:- follow up for RA       HPI:-  Darlyn Ibarra a 60 y.o. pleasant female with history of non-erosive migratory arthritis (+ and +).  Patient here today with complaints of LE (L>R) feet pain.      Patient was started on MTX 15mg Qweekly in May 2018 following findings of synovitis of the hands and ankles.      Today still complaining of bilateral feet and ankle pain (L>R).  Hand discomfort had improved with MTX but still aching.      After starting MTX, pain is better controlled.  Prior to the starting of MTX, patient was unable to walk.  Now, she is able to ambulate without assistance.  Pain is more a dull sensation but persistent and constant.   Tolerating medication well without any complications.  Compliant with all home medications.       Associated symptoms include bilateral hand and feet swelling.  AM stiffness of a couple of hours (feet/ankles).  Patient  continues to feel generalized fatigue/maliase after ~1 hour of ambulation.     Interval History  Tolerating and compliant with MTX SQ and folic acid. Currently on 1mg of SQ MTX and feels significant improvement of her arthralgia - especially of bilateral hand.  Complaining of bilateral feet pain especially after work.  Per patient, she is unable to walk/do too much due to the pain.      AM stiffness x few mins (improvement since last visit).    Took some baclofen which provided some alleviation of muscle spasm during the jc time when patient was very active.  Baclofen caused some drowsiness.      Lower back pain resolved.   No longer taking tylenol.  Abdominal pain resolved.     Denies any fever/chills, recent trauma, N/V, mouth ulcer, GI upset, diarrhea/constipation, urinary symptoms, SOB, or CP    Review of Systems   Constitutional: Negative for chills, diaphoresis, fever, malaise/fatigue and weight loss.   HENT: Negative for congestion, ear discharge, ear pain, hearing loss, nosebleeds, sinus pain and tinnitus.    Eyes: Negative for blurred vision, photophobia, pain, discharge and redness.   Respiratory: Negative for cough, hemoptysis, sputum production, shortness of breath, wheezing and stridor.    Cardiovascular: Negative for chest pain, palpitations, orthopnea, claudication, leg swelling and PND.   Gastrointestinal: Negative for abdominal pain, constipation, diarrhea, heartburn, nausea and vomiting.   Genitourinary: Negative for dysuria, frequency, hematuria and urgency.   Musculoskeletal: Positive for joint pain. Negative for back pain, myalgias and neck pain.   Skin: Negative for rash.   Neurological: Negative for dizziness, tingling, tremors, weakness and headaches.   Endo/Heme/Allergies: Does not bruise/bleed easily.   Psychiatric/Behavioral: Negative for depression, hallucinations and suicidal ideas. The patient is not nervous/anxious and does not have insomnia.        Past Medical History:  "  Diagnosis Date    Arthritis     Depression     Early dry stage nonexudative age-related macular degeneration of both eyes 8/19/2019    Elevated alkaline phosphatase level     Hyperlipidemia     Nuclear sclerosis of both eyes 8/19/2019    Osteopenia     Personal history of female infertility     Respiratory distress     " stopped Breathing" after 2 pain meds were given in a close time frame to one another.    Thyroid disease     multinodular goiter    Vitamin D deficiency disease        Past Surgical History:   Procedure Laterality Date    BREAST BIOPSY Left     over 10 yrs ago    BREAST SURGERY      benign biopsy on left    COLONOSCOPY N/A 6/7/2019    Procedure: COLONOSCOPY;  Surgeon: Julian Waldrop MD;  Location: [facility] ([address] [place] [address]);  Service: Endoscopy;  Laterality: N/A;  2nd floor - per anethesia note from Pt hysterectomy 2013, Pt possible difficult intubation - ERW    HYSTERECTOMY      LAPAROSCOPY W/ MINI-LAPAROTOMY      for fertility issues    OOPHORECTOMY      LA REMOVAL OF OVARY/TUBE(S)      RECTAL SURGERY      excess skin growth removal    supracervical hysterectomy      TONSILLECTOMY          Social History     Tobacco Use    Smoking status: Former Smoker     Packs/day: 2.50     Years: 30.00     Pack years: 75.00    Smokeless tobacco: Never Used   Substance Use Topics    Alcohol use: No     Frequency: Never     Comment: Rare    Drug use: No       Family History   Problem Relation Age of Onset    Breast cancer Mother     Hyperlipidemia Mother     Macular degeneration Mother     Emphysema Father     No Known Problems Sister     No Known Problems Brother     Macular degeneration Maternal Aunt     No Known Problems Maternal Uncle     No Known Problems Paternal Aunt     No Known Problems Paternal Uncle     No Known Problems Maternal Grandmother     No Known Problems Maternal Grandfather     No Known Problems Paternal Grandmother     No Known Problems Paternal Grandfather     " Colon cancer Neg Hx     Ovarian cancer Neg Hx     Melanoma Neg Hx     Amblyopia Neg Hx     Blindness Neg Hx     Cancer Neg Hx     Cataracts Neg Hx     Diabetes Neg Hx     Hypertension Neg Hx     Retinal detachment Neg Hx     Strabismus Neg Hx     Stroke Neg Hx     Thyroid disease Neg Hx     Glaucoma Neg Hx        Review of patient's allergies indicates:   Allergen Reactions    Hydromorphone Shortness Of Breath    Percocet  [oxycodone-acetaminophen] Itching and Nausea Only    Vicodin  [hydrocodone-acetaminophen] Nausea Only and Nausea And Vomiting           Physical examination:-    There were no vitals filed for this visit.    Physical Exam   Constitutional: She is oriented to person, place, and time.   Neurological: She is alert and oriented to person, place, and time.   Virtual visit    Labs:-  Results for BRITTNEY REICH (MRN [id number]) as of 4/8/2020 09:09   Ref. Range 11/5/2019 07:57 11/5/2019 08:02 1/3/2020 08:15 1/3/2020 08:15 4/1/2020 08:30   WBC Latest Ref Range: 3.90 - 12.70 K/uL   8.15  6.57   RBC Latest Ref Range: 4.00 - 5.40 M/uL   4.40  4.52   Hemoglobin Latest Ref Range: 12.0 - 16.0 g/dL   13.4  13.5   Hematocrit Latest Ref Range: 37.0 - 48.5 %   43.4  45.1   MCV Latest Ref Range: 82 - 98 fL   99 (H)  100 (H)   MCH Latest Ref Range: 27.0 - 31.0 pg   30.5  29.9   MCHC Latest Ref Range: 32.0 - 36.0 g/dL   30.9 (L)  29.9 (L)   RDW Latest Ref Range: 11.5 - 14.5 %   13.5  13.4   Platelets Latest Ref Range: 150 - 350 K/uL   331  319   MPV Latest Ref Range: 9.2 - 12.9 fL   10.7  10.6   Gran% Latest Ref Range: 38.0 - 73.0 %   65.8  64.6   Gran # (ANC) Latest Ref Range: 1.8 - 7.7 K/uL   5.4  4.3   Lymph% Latest Ref Range: 18.0 - 48.0 %   23.6  25.9   Lymph # Latest Ref Range: 1.0 - 4.8 K/uL   1.9  1.7   Mono% Latest Ref Range: 4.0 - 15.0 %   6.3  5.8   Mono # Latest Ref Range: 0.3 - 1.0 K/uL   0.5  0.4   Eosinophil% Latest Ref Range: 0.0 - 8.0 %   3.4  2.6   Eos # Latest Ref Range: 0.0 - 0.5 K/uL    0.3  0.2   Basophil% Latest Ref Range: 0.0 - 1.9 %   0.7  0.9   Baso # Latest Ref Range: 0.00 - 0.20 K/uL   0.06  0.06   nRBC Latest Ref Range: 0 /100 WBC   0  0   Differential Method Unknown   Automated  Automated   Immature Grans (Abs) Latest Ref Range: 0.00 - 0.04 K/uL   0.02  0.01   Immature Granulocytes Latest Ref Range: 0.0 - 0.5 %   0.2  0.2   Sed Rate Latest Ref Range: 0 - 36 mm/Hr   12  16   Sodium Latest Ref Range: 136 - 145 mmol/L  144 145 145 143   Potassium Latest Ref Range: 3.5 - 5.1 mmol/L  4.3 4.1 4.1 3.8   Chloride Latest Ref Range: 95 - 110 mmol/L  108 109 109 106   CO2 Latest Ref Range: 23 - 29 mmol/L  29 26 26 29   Anion Gap Latest Ref Range: 8 - 16 mmol/L  7 (L) 10 10 8   BUN, Bld Latest Ref Range: 6 - 20 mg/dL  9 9 9 8   Creatinine Latest Ref Range: 0.5 - 1.4 mg/dL  0.8 0.8 0.8 0.8   eGFR if non African American Latest Ref Range: >60 mL/min/1.73 m^2  >60 >60.0 >60.0 >60.0   eGFR if African American Latest Ref Range: >60 mL/min/1.73 m^2  >60 >60.0 >60.0 >60.0   Glucose Latest Ref Range: 70 - 110 mg/dL  96 95 95 99   Calcium Latest Ref Range: 8.7 - 10.5 mg/dL  9.2 9.2 9.2 9.5   Alkaline Phosphatase Latest Ref Range: 55 - 135 U/L  127 119 119 131   PROTEIN TOTAL Latest Ref Range: 6.0 - 8.4 g/dL  6.7 6.7 6.7 6.8   Albumin Latest Ref Range: 3.5 - 5.2 g/dL  3.9 3.6 3.6 3.6   BILIRUBIN TOTAL Latest Ref Range: 0.1 - 1.0 mg/dL  0.4 0.3 0.3 0.7   AST Latest Ref Range: 10 - 40 U/L  23 15 15 24   ALT Latest Ref Range: 10 - 44 U/L  51 (H) 40 40 38   CRP Latest Ref Range: 0.0 - 8.2 mg/L   10.1 (H)  6.3   Vit D, 25-Hydroxy Latest Ref Range: 30 - 96 ng/mL     30   Specimen UA Unknown Urine, Clean Catch       Color, UA Latest Ref Range: Yellow, Straw, Kyara  Yellow       Appearance, UA Latest Ref Range: Clear  Clear       Specific Force, UA Latest Ref Range: 1.005 - 1.030  1.015       pH, UA Latest Ref Range: 5.0 - 8.0  5.0       Protein, UA Latest Ref Range: Negative  Negative       Glucose, UA Latest Ref  "Range: Negative  Negative       Ketones, UA Latest Ref Range: Negative  Negative       Occult Blood UA Latest Ref Range: Negative  Negative       NITRITE UA Latest Ref Range: Negative  Negative       UROBILINOGEN UA Latest Ref Range: <2.0 EU/dL Negative       Bilirubin (UA) Latest Ref Range: Negative  Negative       Leukocytes, UA Latest Ref Range: Negative  Negative           Imaging  Independent visualization of images done.   Xray of hand bilateral - no fracture or erosion  X ray foot - bilateral foot mild DJD  Xray ankle - normal. No fracture or erosion    Medication List with Changes/Refills   New Medications    PREDNISONE (DELTASONE) 10 MG TABLET    Take 1.5 tablets (15 mg total) by mouth once daily. for 7 days    SULFASALAZINE (AZULFIDINE) 500 MG TBEC    Take 2 tablets (1,000 mg total) by mouth 2 (two) times daily. Take 1 pill twice a day x 7 days and then increase to 2 pills twice a day   Current Medications    ALBUTEROL (PROAIR HFA) 90 MCG/ACTUATION INHALER    Inhale 2 puffs into the lungs every 4 (four) hours as needed. 2 HFA Aerosol Inhaler Inhalation Every 4-6 hours    BACLOFEN (LIORESAL) 10 MG TABLET    Take 0.5 tablets (5 mg total) by mouth 2 (two) times daily. Start with 0.5 tablets (5mg) in the evening first.  If needed, take it in the daytime    DICLOFENAC SODIUM (VOLTAREN) 1 % GEL    Apply 2 g topically 3 (three) times daily.    FOLIC ACID (FOLVITE) 1 MG TABLET    Take 1 tablet (1 mg total) by mouth once daily.    LEVOTHYROXINE (SYNTHROID) 100 MCG TABLET    Take 1 tablet (100 mcg total) by mouth once daily. Fill brand name    MELOXICAM (MOBIC) 15 MG TABLET    TAKE 1 TABLET(15 MG) BY MOUTH EVERY DAY AS NEEDED    METHOTREXATE, PF, (OTREXUP, PF,) 25 MG/0.4 ML ATIN    Inject 0.4 mLs (25 mg total) into the skin every 7 days.    SYRINGE AND NEEDLE,INSULIN,1ML (INSULIN SYRINGE-NEEDLE U-100) 1 ML 29 GAUGE X 7/16" SYRG    U TO INJECT 1ML ONCE A WEEK       Assessment/Plans:-   60 y.o. pleasant female with " history of non-erosive migratory arthritis (+ and +).  Patient was started on MTX 25mg Qweekly (Rosuvo) and daily folic acid . Tolerating medication well without any complication.  Compliant with medications (takes MTX QSunday with daily folic acid).      LFTs normalized.  U/S abdomen showed L sided non obstruction renal calculus.  Abdominal pain had resolved.     Despite improvement in both ESR and CRP, patient continues to bilateral feet synovitis and MCP.  Concerning for RA flare.  Will need addition of sulfasalazine to her MTX (was supposed to be added at the last clinic visit but never started by patient).     Bilateral macular degeneration (beginning) - will hold on starting HCQ at this time.     Plan  - Continue MTX 1mL (25mg Qweekly) Rasuvo - refill provided.  - Continue folate acid daily - refill provided   - Vit D 50,000 IU Qweekly - refill provided   - Start Sulfasalazine 500mg BID x 7 days and then increase 1000mg BID thereafter  - Education provided on the potential side effect and usage of sulfasalazine  - Prednisone 15mg x 7 days   - Voltaren gel in bilateral feet TID daily   - CBC and CMP in 4 weeks - concern about liver toxicity with the addition of sulfasalazine.   - Repeat CBC, CMP, ESR/CRP prior to the next visit             # Follow up in about 3 months (around 7/8/2020).

## 2020-04-13 DIAGNOSIS — E78.5 HYPERLIPIDEMIA, UNSPECIFIED HYPERLIPIDEMIA TYPE: ICD-10-CM

## 2020-04-13 DIAGNOSIS — E03.9 ACQUIRED HYPOTHYROIDISM: ICD-10-CM

## 2020-04-13 DIAGNOSIS — Z00.00 HEALTHCARE MAINTENANCE: Primary | ICD-10-CM

## 2020-04-13 DIAGNOSIS — M05.79 RHEUMATOID ARTHRITIS INVOLVING MULTIPLE SITES WITH POSITIVE RHEUMATOID FACTOR: ICD-10-CM

## 2020-05-05 ENCOUNTER — PATIENT MESSAGE (OUTPATIENT)
Dept: RHEUMATOLOGY | Facility: CLINIC | Age: 60
End: 2020-05-05

## 2020-05-06 ENCOUNTER — LAB VISIT (OUTPATIENT)
Dept: LAB | Facility: HOSPITAL | Age: 60
End: 2020-05-06
Attending: STUDENT IN AN ORGANIZED HEALTH CARE EDUCATION/TRAINING PROGRAM
Payer: COMMERCIAL

## 2020-05-06 DIAGNOSIS — Z79.60 LONG-TERM USE OF IMMUNOSUPPRESSANT MEDICATION: ICD-10-CM

## 2020-05-06 DIAGNOSIS — M13.0 POLYARTHRITIS: ICD-10-CM

## 2020-05-06 DIAGNOSIS — E78.5 HYPERLIPIDEMIA, UNSPECIFIED HYPERLIPIDEMIA TYPE: ICD-10-CM

## 2020-05-06 DIAGNOSIS — Z00.00 HEALTHCARE MAINTENANCE: ICD-10-CM

## 2020-05-06 DIAGNOSIS — M19.90 OSTEOARTHRITIS, UNSPECIFIED OSTEOARTHRITIS TYPE, UNSPECIFIED SITE: ICD-10-CM

## 2020-05-06 DIAGNOSIS — E55.9 VITAMIN D DEFICIENCY: ICD-10-CM

## 2020-05-06 DIAGNOSIS — M05.79 RHEUMATOID ARTHRITIS INVOLVING MULTIPLE SITES WITH POSITIVE RHEUMATOID FACTOR: ICD-10-CM

## 2020-05-06 DIAGNOSIS — E03.9 ACQUIRED HYPOTHYROIDISM: ICD-10-CM

## 2020-05-06 LAB
25(OH)D3+25(OH)D2 SERPL-MCNC: 27 NG/ML (ref 30–96)
ALBUMIN SERPL BCP-MCNC: 3.6 G/DL (ref 3.5–5.2)
ALBUMIN SERPL BCP-MCNC: 3.6 G/DL (ref 3.5–5.2)
ALP SERPL-CCNC: 124 U/L (ref 55–135)
ALP SERPL-CCNC: 124 U/L (ref 55–135)
ALT SERPL W/O P-5'-P-CCNC: 39 U/L (ref 10–44)
ALT SERPL W/O P-5'-P-CCNC: 39 U/L (ref 10–44)
ANION GAP SERPL CALC-SCNC: 8 MMOL/L (ref 8–16)
ANION GAP SERPL CALC-SCNC: 8 MMOL/L (ref 8–16)
AST SERPL-CCNC: 21 U/L (ref 10–40)
AST SERPL-CCNC: 21 U/L (ref 10–40)
BASOPHILS # BLD AUTO: 0.04 K/UL (ref 0–0.2)
BASOPHILS # BLD AUTO: 0.04 K/UL (ref 0–0.2)
BASOPHILS NFR BLD: 0.6 % (ref 0–1.9)
BASOPHILS NFR BLD: 0.6 % (ref 0–1.9)
BILIRUB SERPL-MCNC: 0.5 MG/DL (ref 0.1–1)
BILIRUB SERPL-MCNC: 0.5 MG/DL (ref 0.1–1)
BUN SERPL-MCNC: 9 MG/DL (ref 6–20)
BUN SERPL-MCNC: 9 MG/DL (ref 6–20)
CALCIUM SERPL-MCNC: 9.1 MG/DL (ref 8.7–10.5)
CALCIUM SERPL-MCNC: 9.1 MG/DL (ref 8.7–10.5)
CHLORIDE SERPL-SCNC: 107 MMOL/L (ref 95–110)
CHLORIDE SERPL-SCNC: 107 MMOL/L (ref 95–110)
CHOLEST SERPL-MCNC: 214 MG/DL (ref 120–199)
CHOLEST/HDLC SERPL: 6.1 {RATIO} (ref 2–5)
CO2 SERPL-SCNC: 27 MMOL/L (ref 23–29)
CO2 SERPL-SCNC: 27 MMOL/L (ref 23–29)
CREAT SERPL-MCNC: 0.8 MG/DL (ref 0.5–1.4)
CREAT SERPL-MCNC: 0.8 MG/DL (ref 0.5–1.4)
CRP SERPL-MCNC: 7.1 MG/L (ref 0–8.2)
DIFFERENTIAL METHOD: ABNORMAL
DIFFERENTIAL METHOD: ABNORMAL
EOSINOPHIL # BLD AUTO: 0.2 K/UL (ref 0–0.5)
EOSINOPHIL # BLD AUTO: 0.2 K/UL (ref 0–0.5)
EOSINOPHIL NFR BLD: 3.1 % (ref 0–8)
EOSINOPHIL NFR BLD: 3.1 % (ref 0–8)
ERYTHROCYTE [DISTWIDTH] IN BLOOD BY AUTOMATED COUNT: 14 % (ref 11.5–14.5)
ERYTHROCYTE [DISTWIDTH] IN BLOOD BY AUTOMATED COUNT: 14 % (ref 11.5–14.5)
ERYTHROCYTE [SEDIMENTATION RATE] IN BLOOD BY WESTERGREN METHOD: 10 MM/HR (ref 0–36)
EST. GFR  (AFRICAN AMERICAN): >60 ML/MIN/1.73 M^2
EST. GFR  (AFRICAN AMERICAN): >60 ML/MIN/1.73 M^2
EST. GFR  (NON AFRICAN AMERICAN): >60 ML/MIN/1.73 M^2
EST. GFR  (NON AFRICAN AMERICAN): >60 ML/MIN/1.73 M^2
ESTIMATED AVG GLUCOSE: 105 MG/DL (ref 68–131)
GLUCOSE SERPL-MCNC: 96 MG/DL (ref 70–110)
GLUCOSE SERPL-MCNC: 96 MG/DL (ref 70–110)
HBA1C MFR BLD HPLC: 5.3 % (ref 4–5.6)
HCT VFR BLD AUTO: 44.7 % (ref 37–48.5)
HCT VFR BLD AUTO: 44.7 % (ref 37–48.5)
HDLC SERPL-MCNC: 35 MG/DL (ref 40–75)
HDLC SERPL: 16.4 % (ref 20–50)
HGB BLD-MCNC: 13.6 G/DL (ref 12–16)
HGB BLD-MCNC: 13.6 G/DL (ref 12–16)
IMM GRANULOCYTES # BLD AUTO: 0.01 K/UL (ref 0–0.04)
IMM GRANULOCYTES # BLD AUTO: 0.01 K/UL (ref 0–0.04)
IMM GRANULOCYTES NFR BLD AUTO: 0.1 % (ref 0–0.5)
IMM GRANULOCYTES NFR BLD AUTO: 0.1 % (ref 0–0.5)
LDLC SERPL CALC-MCNC: 149 MG/DL (ref 63–159)
LYMPHOCYTES # BLD AUTO: 1.8 K/UL (ref 1–4.8)
LYMPHOCYTES # BLD AUTO: 1.8 K/UL (ref 1–4.8)
LYMPHOCYTES NFR BLD: 25.2 % (ref 18–48)
LYMPHOCYTES NFR BLD: 25.2 % (ref 18–48)
MCH RBC QN AUTO: 30.2 PG (ref 27–31)
MCH RBC QN AUTO: 30.2 PG (ref 27–31)
MCHC RBC AUTO-ENTMCNC: 30.4 G/DL (ref 32–36)
MCHC RBC AUTO-ENTMCNC: 30.4 G/DL (ref 32–36)
MCV RBC AUTO: 99 FL (ref 82–98)
MCV RBC AUTO: 99 FL (ref 82–98)
MONOCYTES # BLD AUTO: 0.5 K/UL (ref 0.3–1)
MONOCYTES # BLD AUTO: 0.5 K/UL (ref 0.3–1)
MONOCYTES NFR BLD: 6.6 % (ref 4–15)
MONOCYTES NFR BLD: 6.6 % (ref 4–15)
NEUTROPHILS # BLD AUTO: 4.6 K/UL (ref 1.8–7.7)
NEUTROPHILS # BLD AUTO: 4.6 K/UL (ref 1.8–7.7)
NEUTROPHILS NFR BLD: 64.4 % (ref 38–73)
NEUTROPHILS NFR BLD: 64.4 % (ref 38–73)
NONHDLC SERPL-MCNC: 179 MG/DL
NRBC BLD-RTO: 0 /100 WBC
NRBC BLD-RTO: 0 /100 WBC
PLATELET # BLD AUTO: 290 K/UL (ref 150–350)
PLATELET # BLD AUTO: 290 K/UL (ref 150–350)
PMV BLD AUTO: 10.7 FL (ref 9.2–12.9)
PMV BLD AUTO: 10.7 FL (ref 9.2–12.9)
POTASSIUM SERPL-SCNC: 3.8 MMOL/L (ref 3.5–5.1)
POTASSIUM SERPL-SCNC: 3.8 MMOL/L (ref 3.5–5.1)
PROT SERPL-MCNC: 6.8 G/DL (ref 6–8.4)
PROT SERPL-MCNC: 6.8 G/DL (ref 6–8.4)
RBC # BLD AUTO: 4.5 M/UL (ref 4–5.4)
RBC # BLD AUTO: 4.5 M/UL (ref 4–5.4)
SODIUM SERPL-SCNC: 142 MMOL/L (ref 136–145)
SODIUM SERPL-SCNC: 142 MMOL/L (ref 136–145)
TRIGL SERPL-MCNC: 150 MG/DL (ref 30–150)
TSH SERPL DL<=0.005 MIU/L-ACNC: 1.13 UIU/ML (ref 0.4–4)
WBC # BLD AUTO: 7.13 K/UL (ref 3.9–12.7)
WBC # BLD AUTO: 7.13 K/UL (ref 3.9–12.7)

## 2020-05-06 PROCEDURE — 82306 VITAMIN D 25 HYDROXY: CPT

## 2020-05-06 PROCEDURE — 80053 COMPREHEN METABOLIC PANEL: CPT

## 2020-05-06 PROCEDURE — 86140 C-REACTIVE PROTEIN: CPT

## 2020-05-06 PROCEDURE — 83036 HEMOGLOBIN GLYCOSYLATED A1C: CPT

## 2020-05-06 PROCEDURE — 36415 COLL VENOUS BLD VENIPUNCTURE: CPT | Mod: PO

## 2020-05-06 PROCEDURE — 85025 COMPLETE CBC W/AUTO DIFF WBC: CPT

## 2020-05-06 PROCEDURE — 85652 RBC SED RATE AUTOMATED: CPT

## 2020-05-06 PROCEDURE — 80061 LIPID PANEL: CPT

## 2020-05-06 PROCEDURE — 84443 ASSAY THYROID STIM HORMONE: CPT

## 2020-05-08 ENCOUNTER — TELEPHONE (OUTPATIENT)
Dept: PHARMACY | Facility: CLINIC | Age: 60
End: 2020-05-08

## 2020-05-11 DIAGNOSIS — Z12.39 BREAST CANCER SCREENING: Primary | ICD-10-CM

## 2020-05-22 ENCOUNTER — PATIENT MESSAGE (OUTPATIENT)
Dept: RHEUMATOLOGY | Facility: CLINIC | Age: 60
End: 2020-05-22

## 2020-06-01 ENCOUNTER — TELEPHONE (OUTPATIENT)
Dept: PHARMACY | Facility: CLINIC | Age: 60
End: 2020-06-01

## 2020-06-01 NOTE — TELEPHONE ENCOUNTER
LVM with patient on 6/1 for Otrexup refill and follow-up. $0 copay (004). Sent [employer] message.

## 2020-06-22 ENCOUNTER — PATIENT OUTREACH (OUTPATIENT)
Dept: ADMINISTRATIVE | Facility: OTHER | Age: 60
End: 2020-06-22

## 2020-07-03 ENCOUNTER — HOSPITAL ENCOUNTER (EMERGENCY)
Facility: HOSPITAL | Age: 60
Discharge: HOME | End: 2020-07-03
Attending: EMERGENCY MEDICINE
Payer: COMMERCIAL

## 2020-07-03 ENCOUNTER — NURSE TRIAGE (OUTPATIENT)
Dept: ADMINISTRATIVE | Facility: CLINIC | Age: 60
End: 2020-07-03

## 2020-07-03 VITALS
HEART RATE: 61 BPM | SYSTOLIC BLOOD PRESSURE: 105 MMHG | HEIGHT: 62 IN | DIASTOLIC BLOOD PRESSURE: 67 MMHG | WEIGHT: 150 LBS | OXYGEN SATURATION: 95 % | TEMPERATURE: 98 F | RESPIRATION RATE: 18 BRPM | BODY MASS INDEX: 27.6 KG/M2

## 2020-07-03 DIAGNOSIS — N13.30 HYDRONEPHROSIS, UNSPECIFIED HYDRONEPHROSIS TYPE: ICD-10-CM

## 2020-07-03 DIAGNOSIS — N20.0 KIDNEY STONES: Primary | ICD-10-CM

## 2020-07-03 LAB
ALBUMIN SERPL BCP-MCNC: 4 G/DL (ref 3.5–5.2)
ALP SERPL-CCNC: 145 U/L (ref 55–135)
ALT SERPL W/O P-5'-P-CCNC: 25 U/L (ref 10–44)
ANION GAP SERPL CALC-SCNC: 8 MMOL/L (ref 8–16)
AST SERPL-CCNC: 14 U/L (ref 10–40)
BACTERIA #/AREA URNS HPF: ABNORMAL /HPF
BASOPHILS # BLD AUTO: 0.05 K/UL (ref 0–0.2)
BASOPHILS NFR BLD: 0.4 % (ref 0–1.9)
BILIRUB SERPL-MCNC: 0.5 MG/DL (ref 0.1–1)
BILIRUB UR QL STRIP: NEGATIVE
BUN SERPL-MCNC: 9 MG/DL (ref 6–20)
CALCIUM SERPL-MCNC: 9.4 MG/DL (ref 8.7–10.5)
CHLORIDE SERPL-SCNC: 106 MMOL/L (ref 95–110)
CLARITY UR: CLEAR
CO2 SERPL-SCNC: 30 MMOL/L (ref 23–29)
COLOR UR: ABNORMAL
CREAT SERPL-MCNC: 1.1 MG/DL (ref 0.5–1.4)
DIFFERENTIAL METHOD: ABNORMAL
EOSINOPHIL # BLD AUTO: 0 K/UL (ref 0–0.5)
EOSINOPHIL NFR BLD: 0.1 % (ref 0–8)
ERYTHROCYTE [DISTWIDTH] IN BLOOD BY AUTOMATED COUNT: 13.4 % (ref 11.5–14.5)
EST. GFR  (AFRICAN AMERICAN): >60 ML/MIN/1.73 M^2
EST. GFR  (NON AFRICAN AMERICAN): 55 ML/MIN/1.73 M^2
GLUCOSE SERPL-MCNC: 108 MG/DL (ref 70–110)
GLUCOSE UR QL STRIP: NEGATIVE
HCT VFR BLD AUTO: 42.6 % (ref 37–48.5)
HGB BLD-MCNC: 13.5 G/DL (ref 12–16)
HGB UR QL STRIP: ABNORMAL
IMM GRANULOCYTES # BLD AUTO: 0.1 K/UL (ref 0–0.04)
IMM GRANULOCYTES NFR BLD AUTO: 0.7 % (ref 0–0.5)
KETONES UR QL STRIP: NEGATIVE
LEUKOCYTE ESTERASE UR QL STRIP: NEGATIVE
LIPASE SERPL-CCNC: 22 U/L (ref 4–60)
LYMPHOCYTES # BLD AUTO: 0.8 K/UL (ref 1–4.8)
LYMPHOCYTES NFR BLD: 5.9 % (ref 18–48)
MCH RBC QN AUTO: 30.1 PG (ref 27–31)
MCHC RBC AUTO-ENTMCNC: 31.7 G/DL (ref 32–36)
MCV RBC AUTO: 95 FL (ref 82–98)
MICROSCOPIC COMMENT: ABNORMAL
MONOCYTES # BLD AUTO: 0.6 K/UL (ref 0.3–1)
MONOCYTES NFR BLD: 4 % (ref 4–15)
NEUTROPHILS # BLD AUTO: 12.5 K/UL (ref 1.8–7.7)
NEUTROPHILS NFR BLD: 88.9 % (ref 38–73)
NITRITE UR QL STRIP: NEGATIVE
NRBC BLD-RTO: 0 /100 WBC
PH UR STRIP: 7 [PH] (ref 5–8)
PLATELET # BLD AUTO: 312 K/UL (ref 150–350)
PMV BLD AUTO: 9.3 FL (ref 9.2–12.9)
POTASSIUM SERPL-SCNC: 4 MMOL/L (ref 3.5–5.1)
PROT SERPL-MCNC: 6.9 G/DL (ref 6–8.4)
PROT UR QL STRIP: NEGATIVE
RBC # BLD AUTO: 4.49 M/UL (ref 4–5.4)
RBC #/AREA URNS HPF: >100 /HPF (ref 0–4)
SODIUM SERPL-SCNC: 144 MMOL/L (ref 136–145)
SP GR UR STRIP: 1 (ref 1–1.03)
SQUAMOUS #/AREA URNS HPF: 1 /HPF
URN SPEC COLLECT METH UR: ABNORMAL
UROBILINOGEN UR STRIP-ACNC: NEGATIVE EU/DL
WBC # BLD AUTO: 14.1 K/UL (ref 3.9–12.7)

## 2020-07-03 PROCEDURE — 81000 URINALYSIS NONAUTO W/SCOPE: CPT

## 2020-07-03 PROCEDURE — 96375 TX/PRO/DX INJ NEW DRUG ADDON: CPT

## 2020-07-03 PROCEDURE — 63600175 PHARM REV CODE 636 W HCPCS: Performed by: PHYSICIAN ASSISTANT

## 2020-07-03 PROCEDURE — 85025 COMPLETE CBC W/AUTO DIFF WBC: CPT

## 2020-07-03 PROCEDURE — 96361 HYDRATE IV INFUSION ADD-ON: CPT

## 2020-07-03 PROCEDURE — 96374 THER/PROPH/DIAG INJ IV PUSH: CPT

## 2020-07-03 PROCEDURE — 83690 ASSAY OF LIPASE: CPT

## 2020-07-03 PROCEDURE — 25000003 PHARM REV CODE 250: Performed by: PHYSICIAN ASSISTANT

## 2020-07-03 PROCEDURE — 80053 COMPREHEN METABOLIC PANEL: CPT

## 2020-07-03 PROCEDURE — C9113 INJ PANTOPRAZOLE SODIUM, VIA: HCPCS | Performed by: PHYSICIAN ASSISTANT

## 2020-07-03 PROCEDURE — 99284 EMERGENCY DEPT VISIT MOD MDM: CPT | Mod: 25

## 2020-07-03 RX ORDER — KETOROLAC TROMETHAMINE 30 MG/ML
15 INJECTION, SOLUTION INTRAMUSCULAR; INTRAVENOUS
Status: COMPLETED | OUTPATIENT
Start: 2020-07-03 | End: 2020-07-03

## 2020-07-03 RX ORDER — ONDANSETRON 4 MG/1
4 TABLET, ORALLY DISINTEGRATING ORAL EVERY 6 HOURS PRN
Qty: 20 TABLET | Refills: 0 | Status: SHIPPED | OUTPATIENT
Start: 2020-07-03 | End: 2020-10-06 | Stop reason: ALTCHOICE

## 2020-07-03 RX ORDER — PANTOPRAZOLE SODIUM 40 MG/10ML
40 INJECTION, POWDER, LYOPHILIZED, FOR SOLUTION INTRAVENOUS
Status: COMPLETED | OUTPATIENT
Start: 2020-07-03 | End: 2020-07-03

## 2020-07-03 RX ORDER — ONDANSETRON 2 MG/ML
4 INJECTION INTRAMUSCULAR; INTRAVENOUS
Status: COMPLETED | OUTPATIENT
Start: 2020-07-03 | End: 2020-07-03

## 2020-07-03 RX ORDER — TAMSULOSIN HYDROCHLORIDE 0.4 MG/1
0.4 CAPSULE ORAL DAILY
Qty: 10 CAPSULE | Refills: 0 | Status: SHIPPED | OUTPATIENT
Start: 2020-07-03 | End: 2020-07-03 | Stop reason: SINTOL

## 2020-07-03 RX ORDER — KETOROLAC TROMETHAMINE 10 MG/1
10 TABLET, FILM COATED ORAL 4 TIMES DAILY PRN
Qty: 20 TABLET | Refills: 0 | Status: SHIPPED | OUTPATIENT
Start: 2020-07-03 | End: 2020-10-06 | Stop reason: ALTCHOICE

## 2020-07-03 RX ADMIN — KETOROLAC TROMETHAMINE 15 MG: 30 INJECTION, SOLUTION INTRAMUSCULAR at 05:07

## 2020-07-03 RX ADMIN — ONDANSETRON HYDROCHLORIDE 4 MG: 2 SOLUTION INTRAMUSCULAR; INTRAVENOUS at 04:07

## 2020-07-03 RX ADMIN — PANTOPRAZOLE SODIUM 40 MG: 40 INJECTION, POWDER, LYOPHILIZED, FOR SOLUTION INTRAVENOUS at 04:07

## 2020-07-03 RX ADMIN — SODIUM CHLORIDE 1000 ML: 0.9 INJECTION, SOLUTION INTRAVENOUS at 04:07

## 2020-07-03 NOTE — TELEPHONE ENCOUNTER
Patient already at hospital.     Reason for Disposition   Already left for the hospital/clinic.    Protocols used: ST NO CONTACT OR DUPLICATE CONTACT CALL-A-AH

## 2020-07-03 NOTE — DISCHARGE INSTRUCTIONS
Drink lots of fluids, stay well hydrated.  Zofran as needed for nausea Toradol as needed for pain.  Take the Flomax once daily; discontinue the Flomax if you find yourself lightheaded or dizzy while taking medication.    Please follow-up with Urology for re-evaluation.  Return to this emergency department if you begin with fever, if unable to tolerate pain, if uncontrolled vomiting, if no longer eating or drinking, if unable to urinate, if any other problems occur.

## 2020-07-03 NOTE — ED NOTES
Pt states still unable to provide a urine sample. Will follow up after receiving majority of IV fluids.

## 2020-07-06 ENCOUNTER — TELEPHONE (OUTPATIENT)
Dept: PHARMACY | Facility: CLINIC | Age: 60
End: 2020-07-06

## 2020-08-03 ENCOUNTER — TELEPHONE (OUTPATIENT)
Dept: PHARMACY | Facility: CLINIC | Age: 60
End: 2020-08-03

## 2020-08-24 ENCOUNTER — TELEPHONE (OUTPATIENT)
Dept: RHEUMATOLOGY | Facility: CLINIC | Age: 60
End: 2020-08-24

## 2020-08-24 ENCOUNTER — LAB VISIT (OUTPATIENT)
Dept: LAB | Facility: HOSPITAL | Age: 60
End: 2020-08-24
Attending: STUDENT IN AN ORGANIZED HEALTH CARE EDUCATION/TRAINING PROGRAM
Payer: COMMERCIAL

## 2020-08-24 ENCOUNTER — PATIENT MESSAGE (OUTPATIENT)
Dept: RHEUMATOLOGY | Facility: CLINIC | Age: 60
End: 2020-08-24

## 2020-08-24 DIAGNOSIS — E03.9 HYPOTHYROIDISM, UNSPECIFIED TYPE: ICD-10-CM

## 2020-08-24 DIAGNOSIS — M13.0 POLYARTHRITIS: ICD-10-CM

## 2020-08-24 DIAGNOSIS — M05.79 RHEUMATOID ARTHRITIS INVOLVING MULTIPLE SITES WITH POSITIVE RHEUMATOID FACTOR: ICD-10-CM

## 2020-08-24 DIAGNOSIS — Z79.60 LONG-TERM USE OF IMMUNOSUPPRESSANT MEDICATION: ICD-10-CM

## 2020-08-24 DIAGNOSIS — M19.90 OSTEOARTHRITIS, UNSPECIFIED OSTEOARTHRITIS TYPE, UNSPECIFIED SITE: ICD-10-CM

## 2020-08-24 LAB
ALBUMIN SERPL BCP-MCNC: 3.7 G/DL (ref 3.5–5.2)
ALP SERPL-CCNC: 137 U/L (ref 55–135)
ALT SERPL W/O P-5'-P-CCNC: 19 U/L (ref 10–44)
ANION GAP SERPL CALC-SCNC: 11 MMOL/L (ref 8–16)
AST SERPL-CCNC: 11 U/L (ref 10–40)
BASOPHILS # BLD AUTO: 0.04 K/UL (ref 0–0.2)
BASOPHILS NFR BLD: 0.4 % (ref 0–1.9)
BILIRUB SERPL-MCNC: 0.3 MG/DL (ref 0.1–1)
BUN SERPL-MCNC: 6 MG/DL (ref 6–20)
CALCIUM SERPL-MCNC: 8.7 MG/DL (ref 8.7–10.5)
CHLORIDE SERPL-SCNC: 109 MMOL/L (ref 95–110)
CO2 SERPL-SCNC: 25 MMOL/L (ref 23–29)
CREAT SERPL-MCNC: 0.7 MG/DL (ref 0.5–1.4)
CRP SERPL-MCNC: 9.7 MG/L (ref 0–8.2)
DIFFERENTIAL METHOD: ABNORMAL
EOSINOPHIL # BLD AUTO: 0.3 K/UL (ref 0–0.5)
EOSINOPHIL NFR BLD: 2.6 % (ref 0–8)
ERYTHROCYTE [DISTWIDTH] IN BLOOD BY AUTOMATED COUNT: 13.3 % (ref 11.5–14.5)
ERYTHROCYTE [SEDIMENTATION RATE] IN BLOOD BY WESTERGREN METHOD: 28 MM/HR (ref 0–36)
EST. GFR  (AFRICAN AMERICAN): >60 ML/MIN/1.73 M^2
EST. GFR  (NON AFRICAN AMERICAN): >60 ML/MIN/1.73 M^2
GLUCOSE SERPL-MCNC: 89 MG/DL (ref 70–110)
HCT VFR BLD AUTO: 42.8 % (ref 37–48.5)
HGB BLD-MCNC: 13.3 G/DL (ref 12–16)
IMM GRANULOCYTES # BLD AUTO: 0.06 K/UL (ref 0–0.04)
IMM GRANULOCYTES NFR BLD AUTO: 0.5 % (ref 0–0.5)
LYMPHOCYTES # BLD AUTO: 2.7 K/UL (ref 1–4.8)
LYMPHOCYTES NFR BLD: 24.2 % (ref 18–48)
MCH RBC QN AUTO: 30.5 PG (ref 27–31)
MCHC RBC AUTO-ENTMCNC: 31.1 G/DL (ref 32–36)
MCV RBC AUTO: 98 FL (ref 82–98)
MONOCYTES # BLD AUTO: 1 K/UL (ref 0.3–1)
MONOCYTES NFR BLD: 8.9 % (ref 4–15)
NEUTROPHILS # BLD AUTO: 7 K/UL (ref 1.8–7.7)
NEUTROPHILS NFR BLD: 63.4 % (ref 38–73)
NRBC BLD-RTO: 0 /100 WBC
PLATELET # BLD AUTO: 318 K/UL (ref 150–350)
PMV BLD AUTO: 10.7 FL (ref 9.2–12.9)
POTASSIUM SERPL-SCNC: 3.9 MMOL/L (ref 3.5–5.1)
PROT SERPL-MCNC: 6.8 G/DL (ref 6–8.4)
RBC # BLD AUTO: 4.36 M/UL (ref 4–5.4)
SODIUM SERPL-SCNC: 145 MMOL/L (ref 136–145)
WBC # BLD AUTO: 10.95 K/UL (ref 3.9–12.7)

## 2020-08-24 PROCEDURE — 80053 COMPREHEN METABOLIC PANEL: CPT

## 2020-08-24 PROCEDURE — 86140 C-REACTIVE PROTEIN: CPT

## 2020-08-24 PROCEDURE — 85652 RBC SED RATE AUTOMATED: CPT

## 2020-08-24 PROCEDURE — 85025 COMPLETE CBC W/AUTO DIFF WBC: CPT

## 2020-08-24 PROCEDURE — 36415 COLL VENOUS BLD VENIPUNCTURE: CPT | Mod: PO

## 2020-08-24 RX ORDER — LEVOTHYROXINE SODIUM 100 UG/1
100 TABLET ORAL DAILY
Qty: 30 TABLET | Refills: 11 | Status: SHIPPED | OUTPATIENT
Start: 2020-08-24 | End: 2020-11-09 | Stop reason: SDUPTHER

## 2020-08-24 NOTE — TELEPHONE ENCOUNTER
----- Message from Araceli Knapp MD sent at 8/24/2020  6:50 AM CDT -----  Please call patient to schedule appt ASAP.  She will need CBC, CMP, ESR, and CRP to be completed prior to the next visit    Thank you   PK

## 2020-08-24 NOTE — TELEPHONE ENCOUNTER
----- Message from Rubina Humphrey sent at 8/24/2020 12:47 PM CDT -----  Pt would like to receive a call back regarding scheduling a lab and office visit.  Please contact the pt to advise    Contact info [phone]

## 2020-09-02 ENCOUNTER — TELEPHONE (OUTPATIENT)
Dept: RHEUMATOLOGY | Facility: CLINIC | Age: 60
End: 2020-09-02

## 2020-09-02 NOTE — TELEPHONE ENCOUNTER
----- Message from Sarah Jaramillo sent at 9/1/2020  3:22 PM CDT -----  Pt is calling to reschedule an appt. Asking for a call back.    Contact [facility] [phone]

## 2020-09-08 ENCOUNTER — TELEPHONE (OUTPATIENT)
Dept: PHARMACY | Facility: CLINIC | Age: 60
End: 2020-09-08

## 2020-09-10 ENCOUNTER — HOSPITAL ENCOUNTER (OUTPATIENT)
Dept: RADIOLOGY | Facility: HOSPITAL | Age: 60
Discharge: HOME | End: 2020-09-10
Attending: INTERNAL MEDICINE
Payer: COMMERCIAL

## 2020-09-10 DIAGNOSIS — Z12.39 BREAST CANCER SCREENING: ICD-10-CM

## 2020-09-10 PROCEDURE — 77063 BREAST TOMOSYNTHESIS BI: CPT | Mod: 26,,, | Performed by: RADIOLOGY

## 2020-09-10 PROCEDURE — 77067 SCR MAMMO BI INCL CAD: CPT | Mod: TC,PO

## 2020-09-10 PROCEDURE — 77063 MAMMO DIGITAL SCREENING BILAT WITH TOMO: ICD-10-PCS | Mod: 26,,, | Performed by: RADIOLOGY

## 2020-09-10 PROCEDURE — 77067 SCR MAMMO BI INCL CAD: CPT | Mod: 26,,, | Performed by: RADIOLOGY

## 2020-09-10 PROCEDURE — 77067 MAMMO DIGITAL SCREENING BILAT WITH TOMO: ICD-10-PCS | Mod: 26,,, | Performed by: RADIOLOGY

## 2020-10-05 ENCOUNTER — PATIENT MESSAGE (OUTPATIENT)
Dept: ADMINISTRATIVE | Facility: HOSPITAL | Age: 60
End: 2020-10-05

## 2020-10-06 ENCOUNTER — OFFICE VISIT (OUTPATIENT)
Dept: FAMILY MEDICINE | Facility: CLINIC | Age: 60
End: 2020-10-06
Payer: COMMERCIAL

## 2020-10-06 VITALS
WEIGHT: 150.19 LBS | HEART RATE: 72 BPM | BODY MASS INDEX: 27.47 KG/M2 | RESPIRATION RATE: 18 BRPM | SYSTOLIC BLOOD PRESSURE: 116 MMHG | OXYGEN SATURATION: 98 % | DIASTOLIC BLOOD PRESSURE: 67 MMHG | TEMPERATURE: 99 F

## 2020-10-06 DIAGNOSIS — E55.9 VITAMIN D DEFICIENCY DISEASE: ICD-10-CM

## 2020-10-06 DIAGNOSIS — E03.9 ACQUIRED HYPOTHYROIDISM: ICD-10-CM

## 2020-10-06 DIAGNOSIS — M05.79 RHEUMATOID ARTHRITIS INVOLVING MULTIPLE SITES WITH POSITIVE RHEUMATOID FACTOR: ICD-10-CM

## 2020-10-06 DIAGNOSIS — Z00.00 HEALTHCARE MAINTENANCE: Primary | ICD-10-CM

## 2020-10-06 DIAGNOSIS — Z79.60 LONG-TERM USE OF IMMUNOSUPPRESSANT MEDICATION: ICD-10-CM

## 2020-10-06 PROCEDURE — 99999 PR PBB SHADOW E&M-EST. PATIENT-LVL IV: ICD-10-PCS | Mod: PBBFAC,,, | Performed by: INTERNAL MEDICINE

## 2020-10-06 PROCEDURE — 99214 OFFICE O/P EST MOD 30 MIN: CPT | Mod: 25,S$GLB,, | Performed by: INTERNAL MEDICINE

## 2020-10-06 PROCEDURE — 99214 PR OFFICE/OUTPT VISIT, EST, LEVL IV, 30-39 MIN: ICD-10-PCS | Mod: 25,S$GLB,, | Performed by: INTERNAL MEDICINE

## 2020-10-06 PROCEDURE — 3008F BODY MASS INDEX DOCD: CPT | Mod: CPTII,S$GLB,, | Performed by: INTERNAL MEDICINE

## 2020-10-06 PROCEDURE — 90471 FLU VACCINE (QUAD) GREATER THAN OR EQUAL TO 3YO PRESERVATIVE FREE IM: ICD-10-PCS | Mod: S$GLB,,, | Performed by: INTERNAL MEDICINE

## 2020-10-06 PROCEDURE — 3078F PR MOST RECENT DIASTOLIC BLOOD PRESSURE < 80 MM HG: ICD-10-PCS | Mod: CPTII,S$GLB,, | Performed by: INTERNAL MEDICINE

## 2020-10-06 PROCEDURE — 90471 IMMUNIZATION ADMIN: CPT | Mod: S$GLB,,, | Performed by: INTERNAL MEDICINE

## 2020-10-06 PROCEDURE — 3008F PR BODY MASS INDEX (BMI) DOCUMENTED: ICD-10-PCS | Mod: CPTII,S$GLB,, | Performed by: INTERNAL MEDICINE

## 2020-10-06 PROCEDURE — 3074F SYST BP LT 130 MM HG: CPT | Mod: CPTII,S$GLB,, | Performed by: INTERNAL MEDICINE

## 2020-10-06 PROCEDURE — 90686 IIV4 VACC NO PRSV 0.5 ML IM: CPT | Mod: S$GLB,,, | Performed by: INTERNAL MEDICINE

## 2020-10-06 PROCEDURE — 3074F PR MOST RECENT SYSTOLIC BLOOD PRESSURE < 130 MM HG: ICD-10-PCS | Mod: CPTII,S$GLB,, | Performed by: INTERNAL MEDICINE

## 2020-10-06 PROCEDURE — 99999 PR PBB SHADOW E&M-EST. PATIENT-LVL IV: CPT | Mod: PBBFAC,,, | Performed by: INTERNAL MEDICINE

## 2020-10-06 PROCEDURE — 90686 FLU VACCINE (QUAD) GREATER THAN OR EQUAL TO 3YO PRESERVATIVE FREE IM: ICD-10-PCS | Mod: S$GLB,,, | Performed by: INTERNAL MEDICINE

## 2020-10-06 PROCEDURE — 3078F DIAST BP <80 MM HG: CPT | Mod: CPTII,S$GLB,, | Performed by: INTERNAL MEDICINE

## 2020-10-06 RX ORDER — ERGOCALCIFEROL 1.25 MG/1
CAPSULE ORAL
COMMUNITY
Start: 2020-08-21 | End: 2022-05-16 | Stop reason: ALTCHOICE

## 2020-10-06 NOTE — PROGRESS NOTES
"HISTORY OF PRESENT ILLNESS:  Darlyn Donato is a 60 y.o. female who presents to the clinic today for Annual Exam    LOV 1/2019.    She was seen in ED 7/2020 for kidney stones - no issues since then.  She has been followed by her rheumatologist for RA and is stable on her current meds.    The 10-year ASCVD risk score (Julian FOFANA Jr., et al., 2013) is: 2.5%    Values used to calculate the score:      Age: 60 years      Sex: Female      Is Non- : No      Diabetic: No      Tobacco smoker: No      Systolic Blood Pressure: 99 mmHg      Is BP treated: No      HDL Cholesterol: 35 mg/dL      Total Cholesterol: 214 mg/dL    Notes some mood issues with stressors.  Notes some anxiety/depression.  Had been on medication in the past and would rather not having any meds.    She notes h/o fracture to left 2nd toe as a teenager.  She stubbed her toe on a box in the last week and notes some soreness to the toe at present.    PAST MEDICAL HISTORY:  Past Medical History:   Diagnosis Date    Arthritis     Depression     Early dry stage nonexudative age-related macular degeneration of both eyes 8/19/2019    Elevated alkaline phosphatase level     Hyperlipidemia     Nuclear sclerosis of both eyes 8/19/2019    Osteopenia     Personal history of female infertility     Respiratory distress     " stopped Breathing" after 2 pain meds were given in a close time frame to one another.    Thyroid disease     multinodular goiter    Vitamin D deficiency disease        PAST SURGICAL HISTORY:  Past Surgical History:   Procedure Laterality Date    BREAST BIOPSY Left     over 10 yrs ago    BREAST SURGERY      benign biopsy on left    COLONOSCOPY N/A 6/7/2019    Procedure: COLONOSCOPY;  Surgeon: Julian Waldrop MD;  Location: [facility] ([address] [place] [address]);  Service: Endoscopy;  Laterality: N/A;  2nd floor - per anethesia note from Pt hysterectomy 2013, Pt possible difficult intubation - ERW    HYSTERECTOMY      LAPAROSCOPY W/ " MINI-LAPAROTOMY      for fertility issues    OOPHORECTOMY      MT REMOVAL OF OVARY/TUBE(S)      RECTAL SURGERY      excess skin growth removal    supracervical hysterectomy      TONSILLECTOMY         SOCIAL HISTORY:  Social History     Socioeconomic History    Marital status:      Spouse name: Not on file    Number of children: 0    Years of education: some colle    Highest education level: Not on file   Occupational History    Occupation:  law firm     Employer: [employer]   Social Needs    Financial resource strain: Not on file    Food insecurity     Worry: Not on file     Inability: Not on file    Transportation needs     Medical: Not on file     Non-medical: Not on file   Tobacco Use    Smoking status: Former Smoker     Packs/day: 2.50     Years: 30.00     Pack years: 75.00    Smokeless tobacco: Never Used   Substance and Sexual Activity    Alcohol use: No     Frequency: Never     Comment: Rare    Drug use: No    Sexual activity: Not Currently     Partners: Male   Lifestyle    Physical activity     Days per week: Not on file     Minutes per session: Not on file    Stress: Not on file   Relationships    Social connections     Talks on phone: Not on file     Gets together: Not on file     Attends Hoahaoism service: Not on file     Active member of club or organization: Not on file     Attends meetings of clubs or organizations: Not on file     Relationship status: Not on file   Other Topics Concern    Are you pregnant or think you may be? Not Asked    Breast-feeding Not Asked   Social History Narrative    Adult Screenings    Mammogram( for females) done 4/2011    Pap ( for females)? Needs referral to  GYN    Colonoscopy age  52-Not done yet    Flu shot yearly to be done today  1/9/13    Td ?    Pneumovax recommended one time  at age  65    Zostavax recommended one time at  age  62    Eye exam recommended yearly- not done yet     Bone density April 2011-osteopenia        FAMILY HISTORY:  Family History   Problem Relation Age of Onset    Breast cancer Mother     Hyperlipidemia Mother     Macular degeneration Mother     Emphysema Father     No Known Problems Sister     No Known Problems Brother     Macular degeneration Maternal Aunt     No Known Problems Maternal Uncle     No Known Problems Paternal Aunt     No Known Problems Paternal Uncle     No Known Problems Maternal Grandmother     No Known Problems Maternal Grandfather     No Known Problems Paternal Grandmother     No Known Problems Paternal Grandfather     Colon cancer Neg Hx     Ovarian cancer Neg Hx     Melanoma Neg Hx     Amblyopia Neg Hx     Blindness Neg Hx     Cancer Neg Hx     Cataracts Neg Hx     Diabetes Neg Hx     Hypertension Neg Hx     Retinal detachment Neg Hx     Strabismus Neg Hx     Stroke Neg Hx     Thyroid disease Neg Hx     Glaucoma Neg Hx        ALLERGIES AND MEDICATIONS: updated and reviewed.  Review of patient's allergies indicates:   Allergen Reactions    Hydromorphone Shortness Of Breath    Percocet  [oxycodone-acetaminophen] Itching and Nausea Only    Vicodin  [hydrocodone-acetaminophen] Nausea Only and Nausea And Vomiting     Medication List with Changes/Refills   Current Medications    ALBUTEROL (PROAIR HFA) 90 MCG/ACTUATION INHALER    Inhale 2 puffs into the lungs every 4 (four) hours as needed. 2 HFA Aerosol Inhaler Inhalation Every 4-6 hours    BACLOFEN (LIORESAL) 10 MG TABLET    Take 0.5 tablets (5 mg total) by mouth 2 (two) times daily. Start with 0.5 tablets (5mg) in the evening first.  If needed, take it in the daytime    DICLOFENAC SODIUM (VOLTAREN) 1 % GEL    Apply 2 g topically 3 (three) times daily.    FOLIC ACID (FOLVITE) 1 MG TABLET    TAKE 1 TABLET(1 MG) BY MOUTH EVERY DAY    KETOROLAC (TORADOL) 10 MG TABLET    Take 1 tablet (10 mg total) by mouth 4 (four) times daily as needed for Pain.    LEVOTHYROXINE (SYNTHROID) 100 MCG TABLET    Take 1 tablet  "(100 mcg total) by mouth once daily. Fill brand name    METHOTREXATE, PF, (OTREXUP, PF,) 25 MG/0.4 ML ATIN    Inject 0.4 mLs (25 mg total) into the skin every 7 days.    ONDANSETRON (ZOFRAN-ODT) 4 MG TBDL    Take 1 tablet (4 mg total) by mouth every 6 (six) hours as needed (nausea).    SYRINGE AND NEEDLE,INSULIN,1ML (INSULIN SYRINGE-NEEDLE U-100) 1 ML 29 GAUGE X 7/16" SYRG    U TO INJECT 1ML ONCE A WEEK          CARE TEAM:  Patient Care Team:  Amando Prajapati MD as PCP - General (Internal Medicine)  Poncho Rasmussen MA as Care Coordinator         REVIEW OF SYSTEMS:  Review of Systems   Constitutional: Negative for chills and fever.   HENT: Negative for congestion and postnasal drip.    Eyes: Negative for photophobia and visual disturbance.   Respiratory: Negative for cough and shortness of breath.    Cardiovascular: Negative for chest pain and palpitations.   Gastrointestinal: Negative for abdominal pain, nausea and vomiting.   Genitourinary: Negative for dysuria and frequency.   Musculoskeletal: Positive for arthralgias. Negative for back pain.   Neurological: Negative for light-headedness and headaches.   Psychiatric/Behavioral: Negative for dysphoric mood and sleep disturbance.         PHYSICAL EXAM:   Vitals:    10/06/20 0837   BP: 116/67   Pulse: 72   Resp: 18   Temp: 98.9 °F (37.2 °C)             Body mass index is 27.47 kg/m².     General appearance - alert, well appearing, and in no distress and oriented to person, place, and time  Mental status - normal mood, behavior, speech, dress, motor activity, and thought processes  Eyes - sclera anicteric, left eye normal, right eye normal  Ears - bilateral TM's and external ear canals normal  Chest - clear to auscultation, no wheezes, rales or rhonchi, symmetric air entry, no tachypnea, retractions or cyanosis  Heart - normal rate and regular rhythm, S1 and S2 normal, no murmurs noted  Neurological - alert, oriented, normal speech, no focal findings or movement " disorder noted, motor and sensory grossly normal bilaterally  Extremities - peripheral pulses normal, no pedal edema, no clubbing or cyanosis      ASSESSMENT AND PLAN:  Healthcare maintenance  -     Influenza - Quadrivalent *Preferred* (6 months+) (PF)  -     TSH; Future; Expected date: 04/06/2021  -     T4, Free; Future; Expected date: 04/06/2021  -     Vitamin D; Future; Expected date: 04/06/2021  -     Comprehensive Metabolic Panel; Future; Expected date: 04/06/2021  -     Lipid Panel; Future; Expected date: 04/06/2021    Long-term use of immunosuppressant medication  Rheumatoid arthritis involving multiple sites with positive rheumatoid factor    Acquired hypothyroidism    Vitamin D deficiency disease     Advised for conservative mgmt of left 2nd toe and if symptoms persist after 1-2 weeks, may return to clinic and consider XR vs podiatry referral        Follow up May 2020 or sooner as needed.

## 2020-10-29 NOTE — DISCHARGE INSTRUCTIONS
Anesthesia: General Anesthesia     You are watched continuously during your procedure by your anesthesia provider.     Youre due to have surgery. During surgery, youll be given medicine called anesthesia or anesthetic. This will keep you comfortable and pain-free. Your anesthesia provider will use general anesthesia.  What is general anesthesia?  General anesthesia puts you into a state like deep sleep. It goes into the bloodstream (IV anesthetics), into the lungs (gas anesthetics), or both. You feel nothing during the procedure. You will not remember it. During the procedure, the anesthesia provider monitors you continuously. He or she checks your heart rate and rhythm, blood pressure, breathing, and blood oxygen.  · IV anesthetics. IV anesthetics are given through an IV line in your arm. Theyre often given first. This is so you are asleep before a gas anesthetic is started. Some kinds of IV anesthetics relieve pain. Others relax you. Your doctor will decide which kind is best in your case.  · Gas anesthetics. Gas anesthetics are breathed into the lungs. They are often used to keep you asleep. They can be given through a facemask or a tube placed in your larynx or trachea (breathing tube).  ¨ If you have a facemask, your anesthesia provider will most likely place it over your nose and mouth while youre still awake. Youll breathe oxygen through the mask as your IV anesthetic is started. Gas anesthetic may be added through the mask.  ¨ If you have a tube in the larynx or trachea, it will be inserted into your throat after youre asleep.  Anesthesia tools and medicines  You will likely have:  · IV anesthetics. These are put into an IV line into your bloodstream.  · Gas anesthetics. You breathe these anesthetics into your lungs, where they pass into your bloodstream.  · Pulse oximeter. This is a small clip that is attached to the end of your finger. This measures your blood oxygen level.  · Electrocardiography  leads (electrodes). These are small sticky pads that are placed on your chest. They record your heart rate and rhythm.  · Blood pressure cuff. This reads your blood pressure.  Risks and possible complications  General anesthesia has some risks. These include:  · Breathing problems  · Nausea and vomiting  · Sore throat or hoarseness (usually temporary)  · Allergic reaction to the anesthetic  · Irregular heartbeat (rare)  · Cardiac arrest (rare)   Anesthesia safety  · Follow all instructions you are given for how long not to eat or drink before your procedure.  · Be sure your doctor knows what medicines and drugs you take. This includes over-the-counter medicines, herbs, supplements, alcohol or other drugs. You will be asked when those were last taken.  · Have an adult family member or friend drive you home after the procedure.  · For the first 24 hours after your surgery:  ¨ Do not drive or use heavy equipment.  ¨ Do not make important decisions or sign legal documents. If important decisions or signing legal documents is necessary during the first 24 hours after surgery, have a trusted family member or spouse act on your behalf.  ¨ Avoid alcohol.  ¨ Have a responsible adult stay with you. He or she can watch for problems and help keep you safe.  Date Last Reviewed: 12/1/2016  © 7472-2441 [employer]. [address] [place] [address], [place] [address]. All rights reserved. This information is not intended as a substitute for professional medical care. Always follow your healthcare professional's instructions.        Colonoscopy     A camera attached to a flexible tube with a viewing lens is used to take video pictures.     Colonoscopy is a test to view the inside of your lower digestive tract (colon and rectum). Sometimes it can show the last part of the small intestine (ileum). During the test, small pieces of tissue may be removed for testing. This is called a biopsy. Small growths, such as polyps, may also be  removed.   Why is colonoscopy done?  The test is done to help look for colon cancer. And it can help find the source of abdominal pain, bleeding, and changes in bowel habits. It may be needed once a year, depending on factors such as your:  · Age  · Health history  · Family health history  · Symptoms  · Results from any prior colonoscopy  Risks and possible complications  These include:  · Bleeding               · A puncture or tear in the colon   · Risks of anesthesia  · A cancer lesion not being seen  Getting ready   To prepare for the test:  · Talk with your healthcare provider about the risks of the test (see below). Also ask your healthcare provider about alternatives to the test.  · Tell your healthcare provider about any medicines you take. Also tell him or her about any health conditions you may have.  · Make sure your rectum and colon are empty for the test. Follow the diet and bowel prep instructions exactly. If you dont, the test may need to be rescheduled.  · Plan for a friend or family member to drive you home after the test.     Colonoscopy provides an inside view of the entire colon.     You may discuss the results with your doctor right away or at a future visit.  During the test   The test is usually done in the hospital on an outpatient basis. This means you go home the same day. The procedure takes about 30 minutes. During that time:  · You are given relaxing (sedating) medicine through an IV line. You may be drowsy, or fully asleep.  · The healthcare provider will first give you a physical exam to check for anal and rectal problems.  · Then the anus is lubricated and the scope inserted.  · If you are awake, you may have a feeling similar to needing to have a bowel movement. You may also feel pressure as air is pumped into the colon. Its OK to pass gas during the procedure.  · Biopsy, polyp removal, or other treatments may be done during the test.  After the test   You may have gas right after  the test. It can help to try to pass it to help prevent later bloating. Your healthcare provider may discuss the results with you right away. Or you may need to schedule a follow-up visit to talk about the results. After the test, you can go back to your normal eating and other activities. You may be tired from the sedation and need to rest for a few hours.  Date Last Reviewed: 11/1/2016  © 4200-0044 [employer]. [address] [place] [address], [place] [address]. All rights reserved. This information is not intended as a substitute for professional medical care. Always follow your healthcare professional's instructions.         declines

## 2020-11-04 ENCOUNTER — SPECIALTY PHARMACY (OUTPATIENT)
Dept: PHARMACY | Facility: CLINIC | Age: 60
End: 2020-11-04

## 2020-11-09 DIAGNOSIS — E03.9 HYPOTHYROIDISM, UNSPECIFIED TYPE: ICD-10-CM

## 2020-11-09 RX ORDER — LEVOTHYROXINE SODIUM 100 UG/1
100 TABLET ORAL DAILY
Qty: 30 TABLET | Refills: 11 | Status: SHIPPED | OUTPATIENT
Start: 2020-11-09 | End: 2021-05-10 | Stop reason: SDUPTHER

## 2020-11-27 ENCOUNTER — OFFICE VISIT (OUTPATIENT)
Dept: RHEUMATOLOGY | Facility: CLINIC | Age: 60
End: 2020-11-27
Payer: COMMERCIAL

## 2020-11-27 ENCOUNTER — SPECIALTY PHARMACY (OUTPATIENT)
Dept: PHARMACY | Facility: CLINIC | Age: 60
End: 2020-11-27

## 2020-11-27 ENCOUNTER — HOSPITAL ENCOUNTER (OUTPATIENT)
Dept: RADIOLOGY | Facility: HOSPITAL | Age: 60
Discharge: HOME | End: 2020-11-27
Attending: INTERNAL MEDICINE
Payer: COMMERCIAL

## 2020-11-27 VITALS
DIASTOLIC BLOOD PRESSURE: 81 MMHG | TEMPERATURE: 98 F | HEIGHT: 62 IN | SYSTOLIC BLOOD PRESSURE: 110 MMHG | WEIGHT: 153 LBS | BODY MASS INDEX: 28.16 KG/M2 | HEART RATE: 71 BPM

## 2020-11-27 DIAGNOSIS — M05.79 RHEUMATOID ARTHRITIS INVOLVING MULTIPLE SITES WITH POSITIVE RHEUMATOID FACTOR: ICD-10-CM

## 2020-11-27 DIAGNOSIS — M05.79 RHEUMATOID ARTHRITIS INVOLVING MULTIPLE SITES WITH POSITIVE RHEUMATOID FACTOR: Primary | ICD-10-CM

## 2020-11-27 DIAGNOSIS — M13.0 POLYARTHRITIS: Primary | ICD-10-CM

## 2020-11-27 DIAGNOSIS — Z79.60 LONG-TERM USE OF IMMUNOSUPPRESSANT MEDICATION: ICD-10-CM

## 2020-11-27 PROCEDURE — 3008F BODY MASS INDEX DOCD: CPT | Mod: CPTII,S$GLB,, | Performed by: INTERNAL MEDICINE

## 2020-11-27 PROCEDURE — 73130 XR HAND COMPLETE 3 VIEWS BILATERAL: ICD-10-PCS | Mod: 26,,, | Performed by: RADIOLOGY

## 2020-11-27 PROCEDURE — 3074F SYST BP LT 130 MM HG: CPT | Mod: CPTII,S$GLB,, | Performed by: INTERNAL MEDICINE

## 2020-11-27 PROCEDURE — 73630 XR FOOT COMPLETE 3 VIEW BILATERAL: ICD-10-PCS | Mod: 26,,, | Performed by: RADIOLOGY

## 2020-11-27 PROCEDURE — 73630 X-RAY EXAM OF FOOT: CPT | Mod: TC,50

## 2020-11-27 PROCEDURE — 73130 X-RAY EXAM OF HAND: CPT | Mod: TC,50

## 2020-11-27 PROCEDURE — 3079F PR MOST RECENT DIASTOLIC BLOOD PRESSURE 80-89 MM HG: ICD-10-PCS | Mod: CPTII,S$GLB,, | Performed by: INTERNAL MEDICINE

## 2020-11-27 PROCEDURE — 99999 PR PBB SHADOW E&M-EST. PATIENT-LVL III: ICD-10-PCS | Mod: PBBFAC,,, | Performed by: INTERNAL MEDICINE

## 2020-11-27 PROCEDURE — 3074F PR MOST RECENT SYSTOLIC BLOOD PRESSURE < 130 MM HG: ICD-10-PCS | Mod: CPTII,S$GLB,, | Performed by: INTERNAL MEDICINE

## 2020-11-27 PROCEDURE — 73630 X-RAY EXAM OF FOOT: CPT | Mod: 26,,, | Performed by: RADIOLOGY

## 2020-11-27 PROCEDURE — 99214 OFFICE O/P EST MOD 30 MIN: CPT | Mod: S$GLB,,, | Performed by: INTERNAL MEDICINE

## 2020-11-27 PROCEDURE — 73130 X-RAY EXAM OF HAND: CPT | Mod: 26,,, | Performed by: RADIOLOGY

## 2020-11-27 PROCEDURE — 1125F AMNT PAIN NOTED PAIN PRSNT: CPT | Mod: S$GLB,,, | Performed by: INTERNAL MEDICINE

## 2020-11-27 PROCEDURE — 99214 PR OFFICE/OUTPT VISIT, EST, LEVL IV, 30-39 MIN: ICD-10-PCS | Mod: S$GLB,,, | Performed by: INTERNAL MEDICINE

## 2020-11-27 PROCEDURE — 3008F PR BODY MASS INDEX (BMI) DOCUMENTED: ICD-10-PCS | Mod: CPTII,S$GLB,, | Performed by: INTERNAL MEDICINE

## 2020-11-27 PROCEDURE — 1125F PR PAIN SEVERITY QUANTIFIED, PAIN PRESENT: ICD-10-PCS | Mod: S$GLB,,, | Performed by: INTERNAL MEDICINE

## 2020-11-27 PROCEDURE — 99999 PR PBB SHADOW E&M-EST. PATIENT-LVL III: CPT | Mod: PBBFAC,,, | Performed by: INTERNAL MEDICINE

## 2020-11-27 PROCEDURE — 3079F DIAST BP 80-89 MM HG: CPT | Mod: CPTII,S$GLB,, | Performed by: INTERNAL MEDICINE

## 2020-11-27 NOTE — TELEPHONE ENCOUNTER
Specialty Pharmacy - Refill Coordination    Specialty Medication Orders Linked to Encounter      Most Recent Value   Medication #1  methotrexate, PF, (OTREXUP, PF,) 25 mg/0.4 mL AtIn (Order#[id number], Rx#[id number])          Refill Questions - Documented Responses      Most Recent Value   Relationship to patient of person spoken to?  Self   HIPAA/medical authority confirmed?  Yes   Any changes in contact preferences or allowed representatives?  No   Has the patient had any insurance changes?  No   Has the patient had any changes to specialty medication, dose, or instructions?  No   Has the patient started taking any new medications, herbals, or supplements?  No   Has the patient been diagnosed with any new medical conditions?  No   Does the patient have any new allergies to medications or foods?  No   Does the patient have any concerns about side effects?  No   Can the patient store medication/sharps container properly (at the correct temperature, away from children/pets, etc.)?  Yes   Can the patient call emergency services (911) in the event of an emergency?  Yes   Does the patient have any concerns or questions about taking or administering this medication as prescribed?  No   How many doses did the patient miss in the past 4 weeks or since the last fill?  0   How many doses does the patient have on hand?  1   How many days does the patient report on hand quantity will last?  7   Does the number of doses/days supply remaining match pharmacy expected amounts?  Yes   Does the patient feel that this medication is effective?  Yes   During the past 4 weeks, has patient missed any activities due to condition or medication?  No   During the past 4 weeks, did patient have any of the following urgent care visits?  None   How will the patient receive the medication?  Mail   When does the patient need to receive the medication?  12/06/20   Shipping Address  Home   Address in [facility] confirmed and updated if  neccessary?  Yes   Expected Copay ($)  0   Is the patient able to afford the medication copay?  Yes   Payment Method  zero copay   Days supply of Refill  28   Would patient like to speak to a pharmacist?  No   Do you want to trigger an intervention?  No   Do you want to trigger an additional referral task?  No   Refill activity completed?  Yes   Refill activity plan  Refill scheduled   Shipment/Pickup Date:  12/01/20          Current Outpatient Medications   Medication Sig    albuterol (PROAIR HFA) 90 mcg/actuation inhaler Inhale 2 puffs into the lungs every 4 (four) hours as needed. 2 HFA Aerosol Inhaler Inhalation Every 4-6 hours    diclofenac sodium (VOLTAREN) 1 % Gel Apply 2 g topically 3 (three) times daily.    ergocalciferol (ERGOCALCIFEROL) 50,000 unit Cap TK 1 C PO Q 7 DAYS    folic acid (FOLVITE) 1 MG tablet TAKE 1 TABLET(1 MG) BY MOUTH EVERY DAY    levothyroxine (SYNTHROID) 100 MCG tablet Take 1 tablet (100 mcg total) by mouth once daily. Fill brand name    methotrexate, PF, (OTREXUP, PF,) 25 mg/0.4 mL AtIn Inject 0.4 mLs (25 mg total) into the skin every 7 days.   Last reviewed on 11/27/2020  2:17 PM by Justina Silver    Review of patient's allergies indicates:   Allergen Reactions    Hydromorphone Shortness Of Breath    Percocet  [oxycodone-acetaminophen] Itching and Nausea Only    Vicodin  [hydrocodone-acetaminophen] Nausea Only and Nausea And Vomiting    Last reviewed on  11/27/2020 2:17 PM by Justina Silver      Tasks added this encounter   No tasks added.   Tasks due within next 3 months   11/27/2020 - Clinical - Follow Up Assesement (90 day)  11/27/2020 - Refill Call (Auto Added)   Sharps added to this fill.  Justina Silver  [facility] - [facility] Pharm[facility]  [address] [place] [id number]  Phone: [phone]  Fax: [phone]

## 2020-11-27 NOTE — TELEPHONE ENCOUNTER
Specialty Pharmacy - Clinical Reassessment    Specialty Medication Orders Linked to Encounter      Most Recent Value   Medication #1  methotrexate, PF, (OTREXUP, PF,) 25 mg/0.4 mL AtIn (Order#[id number], Rx#[id number])        Subjective    Darlyn Donato is a 60 y.o. female, who is followed by the specialty pharmacy service for management and education.    Recent Encounters     Date Type Provider Description    11/27/2020 Specialty Pharmacy Candice Miles PharmD Follow-up Clinical Reassessment    11/27/2020 Specialty Pharmacy Justina Silver Refill Coordination    11/04/2020 Specialty Pharmacy Paula Galvan Refill Coordination        Clinical call attempts since last clinical assessment   No call attempts found.     Today she received follow up education for her specialty medication(s).    Current Outpatient Medications   Medication Sig    albuterol (PROAIR HFA) 90 mcg/actuation inhaler Inhale 2 puffs into the lungs every 4 (four) hours as needed. 2 HFA Aerosol Inhaler Inhalation Every 4-6 hours    diclofenac sodium (VOLTAREN) 1 % Gel Apply 2 g topically 3 (three) times daily.    ergocalciferol (ERGOCALCIFEROL) 50,000 unit Cap TK 1 C PO Q 7 DAYS    folic acid (FOLVITE) 1 MG tablet TAKE 1 TABLET(1 MG) BY MOUTH EVERY DAY    levothyroxine (SYNTHROID) 100 MCG tablet Take 1 tablet (100 mcg total) by mouth once daily. Fill brand name    methotrexate, PF, (OTREXUP, PF,) 25 mg/0.4 mL AtIn Inject 0.4 mLs (25 mg total) into the skin every 7 days.   Last reviewed on 11/27/2020  3:15 PM by Candice Miles, Jaqui    Review of patient's allergies indicates:   Allergen Reactions    Hydromorphone Shortness Of Breath    Percocet  [oxycodone-acetaminophen] Itching and Nausea Only    Vicodin  [hydrocodone-acetaminophen] Nausea Only and Nausea And Vomiting   Last reviewed on  11/27/2020 3:15 PM by Candice Miles    Drug Interactions    Drug interactions evaluated: yes  Clinically relevant drug interactions identified:  no  Provided the patient with educational material regarding drug interactions: not applicable       Medication Adherence    Patient reported X missed doses in the last month: 0  Any gaps in refill history greater than 2 weeks in the last 3 months: no  Demonstrates understanding of importance of adherence: yes  Informant: patient  Reliability of informant: reliable  Provider-estimated medication adherence level: %  Reasons for non-adherence: no problems identified   Other adherence tool: Routine- every Sunday   Support network for adherence: family member  Confirmed plan for next specialty medication refill: delivery by pharmacy  Refills needed for supportive medications: not needed       Adverse Effects    Arthralgias: Pos  *All other systems reviewed and are negative       Assessment Questions - Documented Responses      Most Recent Value   Assessment   Medication Reconciliation completed for patient  Yes   During the past 4 weeks, has patient missed any activities due to condition or medication?  No   During the past 4 weeks, did patient have any of the following urgent care visits?  None   Goals of Therapy Status  Achieving   Welcome packet contents reviewed and discussed with patient?  No   Assesment completed?  Yes   Plan  Therapy continued   Do you need to open a clinical intervention (i-vent)?  No   Do you want to schedule first shipment?  No   Medication #1 Assessment Info   Patient status  Existing medication   Is this medication appropriate for the patient?  Yes   Is this medication effective?  Yes          Objective    She has a past medical history of Arthritis, Depression, Early dry stage nonexudative age-related macular degeneration of both eyes (8/19/2019), Elevated alkaline phosphatase level, Hyperlipidemia, Nuclear sclerosis of both eyes (8/19/2019), Osteopenia, Personal history of female infertility, Respiratory distress, Thyroid disease, and Vitamin D deficiency disease.    Tried/failed  "medications: MTX (oral)    BP Readings from Last 4 Encounters:   11/27/20 110/81   10/06/20 116/67   07/03/20 105/67   01/08/20 99/67     Ht Readings from Last 4 Encounters:   11/27/20 5' 2" (1.575 m)   07/03/20 5' 2" (1.575 m)   01/08/20 5' 2" (1.575 m)   10/30/19 5' 2" (1.575 m)     Wt Readings from Last 4 Encounters:   11/27/20 69.4 kg (153 lb)   10/06/20 68.1 kg (150 lb 3.2 oz)   07/03/20 68 kg (150 lb)   01/08/20 67.1 kg (147 lb 14.9 oz)     Recent Labs   Lab Result Units 11/27/20  1207   Creatinine mg/dL 0.7   ALT U/L 23   AST U/L 12     The goals of rheumatoid arthritis treatment include:  · Relieving pain and suppressing inflammation  · Achieving remission and preventing joint and organ damage  · Increasing joint mobility and strength  · Preventing infection and other complications of treatment  · Reducing long term complications of rheumatoid arthritis  · Improving or maintaining physical function and optimal well-being  · Improving or maintaining quality of life  · Maintaining optimal therapy adherence  · Minimizing and managing side effects    Goals of Therapy Status: Achieving    Assessment/Plan  Patient plans to continue therapy without changes.  Patient confirmed she is still injecting 0.4 mLs (25 mg total) into the skin every 7 days.  Patient injects every Sunday.  Patient reports improvement in her RA.  Her average pain is 2-3/10.  Her most affected joint is the feet.  She is still stiff in the AM for hours.  She has no recent ER/UC visits or missed work/planned activities.      Patient declined review of injection steps as she is very comfortable with self-injection.  Patient mentioned she does rotate injection sites.  Patient stores Otrexup at room temperature away from heat/light, children, and pets.  Patient's med list has no current DDI.  Patient will alert OSP/provider if new medications are started.  Patient is on folic acid appropriately with her Otrexup.  Patient aware of importance of " follow up labs.  Patient had visit with provider today and got labs and Xrays done.     Indication, dosage, appropriateness, effectiveness, safety and convenience of her specialty medication(s) were reviewed today.     Patient Counseling    Counseled the patient on the following: doses and administration discussed, safe handling, storage, and disposal discussed, possible drug and prescription drug interactions discussed, possible drug and OTC drug and food interactions discussed, lab monitoring and follow-up discussed, adherence and missed doses discussed, pharmacy contact information discussed           Tasks added this encounter   2/18/2021 - Clinical - Follow Up Assesement (90 day)   Tasks due within next 3 months   12/25/2020 - Refill Call (Auto Added)     Candice Miles, Jaqui  [facility] - Specialty Pharmacy  [address] [place] [id number]  Phone: [phone]  Fax: [phone]

## 2020-12-03 NOTE — PROGRESS NOTES
History of present illness:  60-year-old female with chronic low back pain.  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  I diagnosed her as having rheumatoid arthritis.  I placed her on methotrexate 15 mg weekly.  She was followed by Dr. Knapp during my absence.  She was changed to injectable methotrexate and is on 25 mg weekly.  She was also placed on sulfasalazine but she did not tolerate it she has macular degeneration so she was not considered a candidate for Plaquenil.  She also had a short course of prednisone    She still is having pain in her hands and feet.  The pain is worse with activity.  She does have some swelling.  She injured her right ankle 2 weeks ago and complains of occasional sharp pain in the ankle.  This is worse with pressure.  Her pain is better in the morning.  She does have 1-2 hours of morning stiffness.  She denies any nocturnal pain.  She has had some pain in the neck.  Her back pain is unchanged.  She did have an episode of kidney stones    She is taking Advil with some relief.  She has also been using Voltaren gel.  She takes Zanaflex as needed.  She is still on a vitamin-D supplement she has had no fever or infections.  She denies other recent medical problems    Physical examination:  Musculoskeletal:  Cervical spine is unremarkable.  She has decreased range of motion shoulders with pain on range of motion.  She has no tender areas to palpation.  Elbows and wrists are unremarkable.  She has bony hypertrophy of the right PIP but no synovitis in the hands.  Lumbar spine was not examined.  Hips and knees are unremarkable.  She has soft tissue swelling of the right ankle.  She has no pain on range of motion.  She has no tenderness to palpation.  The MTPs are tender bilaterally.  She has swelling.    Assessment:  1.  Rheumatoid arthritis.  I find no evidence of active inflammation.  I suspect a lot of her pain is due to secondary  osteoarthritis    Plans:  1.  Laboratory studies and x-rays are obtained.  2.  Continue methotrexate as before  3.  Continue the use of Voltaren gel  4.  Return in 3 months

## 2020-12-30 ENCOUNTER — SPECIALTY PHARMACY (OUTPATIENT)
Dept: PHARMACY | Facility: CLINIC | Age: 60
End: 2020-12-30

## 2020-12-30 DIAGNOSIS — M05.79 RHEUMATOID ARTHRITIS INVOLVING MULTIPLE SITES WITH POSITIVE RHEUMATOID FACTOR: ICD-10-CM

## 2020-12-30 RX ORDER — METHOTREXATE 25 MG/.4ML
25 INJECTION, SOLUTION SUBCUTANEOUS
Qty: 1.6 ML | Refills: 11 | Status: SHIPPED | OUTPATIENT
Start: 2020-12-30 | End: 2021-02-05 | Stop reason: SDUPTHER

## 2020-12-30 RX ORDER — METHOTREXATE 25 MG/.4ML
25 INJECTION, SOLUTION SUBCUTANEOUS
Qty: 1.6 ML | Refills: 11 | Status: CANCELLED | OUTPATIENT
Start: 2020-12-30 | End: 2021-01-29

## 2020-12-30 NOTE — TELEPHONE ENCOUNTER
Specialty Pharmacy - Refill Coordination    Specialty Medication Orders Linked to Encounter      Most Recent Value   Medication #1  methotrexate, PF, (OTREXUP, PF,) 25 mg/0.4 mL AtIn (Order#[id number], Rx#[id number])          Refill Questions - Documented Responses      Most Recent Value   Relationship to patient of person spoken to?  Self   HIPAA/medical authority confirmed?  Yes   Any changes in contact preferences or allowed representatives?  No   Has the patient had any insurance changes?  No   Has the patient had any changes to specialty medication, dose, or instructions?  No   Has the patient started taking any new medications, herbals, or supplements?  No   Has the patient been diagnosed with any new medical conditions?  No   Does the patient have any new allergies to medications or foods?  No   Does the patient have any concerns about side effects?  No   Can the patient store medication/sharps container properly (at the correct temperature, away from children/pets, etc.)?  Yes   Can the patient call emergency services (911) in the event of an emergency?  Yes   Does the patient have any concerns or questions about taking or administering this medication as prescribed?  No   How many doses did the patient miss in the past 4 weeks or since the last fill?  0   How many doses does the patient have on hand?  0   How many days does the patient report on hand quantity will last?  0   Does the number of doses/days supply remaining match pharmacy expected amounts?  Yes   Does the patient feel that this medication is effective?  Yes   During the past 4 weeks, has patient missed any activities due to condition or medication?  No   During the past 4 weeks, did patient have any of the following urgent care visits?  None   How will the patient receive the medication?  Mail   When does the patient need to receive the medication?  01/03/21   Shipping Address  Home   Address in [facility] confirmed and updated if  neccessary?  Yes   Expected Copay ($)  0   Is the patient able to afford the medication copay?  Yes   Payment Method  zero copay   Days supply of Refill  28   Would patient like to speak to a pharmacist?  No   Do you want to trigger an intervention?  No   Do you want to trigger an additional referral task?  No   Refill activity completed?  Yes   Refill activity plan  Refill scheduled   Shipment/Pickup Date:  12/30/20          Current Outpatient Medications   Medication Sig    albuterol (PROAIR HFA) 90 mcg/actuation inhaler Inhale 2 puffs into the lungs every 4 (four) hours as needed. 2 HFA Aerosol Inhaler Inhalation Every 4-6 hours    diclofenac sodium (VOLTAREN) 1 % Gel Apply 2 g topically 3 (three) times daily.    ergocalciferol (ERGOCALCIFEROL) 50,000 unit Cap TK 1 C PO Q 7 DAYS    folic acid (FOLVITE) 1 MG tablet TAKE 1 TABLET(1 MG) BY MOUTH EVERY DAY    levothyroxine (SYNTHROID) 100 MCG tablet Take 1 tablet (100 mcg total) by mouth once daily. Fill brand name    methotrexate, PF, (OTREXUP, PF,) 25 mg/0.4 mL AtIn Inject 0.4 mLs (25 mg total) into the skin every 7 days.   Last reviewed on 11/27/2020  3:15 PM by Candice Miles, PharmLUCIEN    Review of patient's allergies indicates:   Allergen Reactions    Hydromorphone Shortness Of Breath    Percocet  [oxycodone-acetaminophen] Itching and Nausea Only    Vicodin  [hydrocodone-acetaminophen] Nausea Only and Nausea And Vomiting    Last reviewed on  11/27/2020 3:15 PM by Candice Miles      Tasks added this encounter   1/22/2021 - Refill Call (Auto Added)   Tasks due within next 3 months   2/18/2021 - Clinical - Follow Up Assesement (90 day)     [facility] - Specialty Pharm[facility]  [address] [place] [id number]  Phone: [phone]  Fax: [phone]

## 2021-01-11 PROBLEM — Z00.00 HEALTHCARE MAINTENANCE: Status: RESOLVED | Noted: 2020-10-06 | Resolved: 2021-01-11

## 2021-01-27 ENCOUNTER — SPECIALTY PHARMACY (OUTPATIENT)
Dept: PHARMACY | Facility: CLINIC | Age: 61
End: 2021-01-27

## 2021-01-27 ENCOUNTER — PATIENT MESSAGE (OUTPATIENT)
Dept: PHARMACY | Facility: CLINIC | Age: 61
End: 2021-01-27

## 2021-02-05 ENCOUNTER — OFFICE VISIT (OUTPATIENT)
Dept: RHEUMATOLOGY | Facility: CLINIC | Age: 61
End: 2021-02-05
Payer: COMMERCIAL

## 2021-02-05 DIAGNOSIS — Z79.60 LONG-TERM USE OF IMMUNOSUPPRESSANT MEDICATION: ICD-10-CM

## 2021-02-05 DIAGNOSIS — M85.80 OSTEOPENIA, UNSPECIFIED LOCATION: Primary | ICD-10-CM

## 2021-02-05 DIAGNOSIS — M19.90 OSTEOARTHRITIS, UNSPECIFIED OSTEOARTHRITIS TYPE, UNSPECIFIED SITE: ICD-10-CM

## 2021-02-05 DIAGNOSIS — M05.79 RHEUMATOID ARTHRITIS INVOLVING MULTIPLE SITES WITH POSITIVE RHEUMATOID FACTOR: ICD-10-CM

## 2021-02-05 DIAGNOSIS — Z51.81 MEDICATION MONITORING ENCOUNTER: ICD-10-CM

## 2021-02-05 PROCEDURE — 99214 PR OFFICE/OUTPT VISIT, EST, LEVL IV, 30-39 MIN: ICD-10-PCS | Mod: 95,,, | Performed by: STUDENT IN AN ORGANIZED HEALTH CARE EDUCATION/TRAINING PROGRAM

## 2021-02-05 PROCEDURE — 99214 OFFICE O/P EST MOD 30 MIN: CPT | Mod: 95,,, | Performed by: STUDENT IN AN ORGANIZED HEALTH CARE EDUCATION/TRAINING PROGRAM

## 2021-02-05 RX ORDER — FOLIC ACID 1 MG/1
TABLET ORAL
Qty: 30 TABLET | Refills: 11 | Status: SHIPPED | OUTPATIENT
Start: 2021-02-05 | End: 2022-02-18 | Stop reason: SDUPTHER

## 2021-02-05 RX ORDER — METHOTREXATE 25 MG/.4ML
25 INJECTION, SOLUTION SUBCUTANEOUS
Qty: 1.6 ML | Refills: 11 | Status: SHIPPED | OUTPATIENT
Start: 2021-02-05 | End: 2021-03-07

## 2021-02-05 RX ORDER — DICLOFENAC SODIUM 10 MG/G
2 GEL TOPICAL 3 TIMES DAILY
Qty: 1 TUBE | Refills: 2 | Status: SHIPPED | OUTPATIENT
Start: 2021-02-05

## 2021-02-20 ENCOUNTER — LAB VISIT (OUTPATIENT)
Dept: LAB | Facility: HOSPITAL | Age: 61
End: 2021-02-20
Attending: STUDENT IN AN ORGANIZED HEALTH CARE EDUCATION/TRAINING PROGRAM
Payer: COMMERCIAL

## 2021-02-20 DIAGNOSIS — M05.79 RHEUMATOID ARTHRITIS INVOLVING MULTIPLE SITES WITH POSITIVE RHEUMATOID FACTOR: ICD-10-CM

## 2021-02-20 LAB
ALBUMIN SERPL BCP-MCNC: 3.6 G/DL (ref 3.5–5.2)
ALP SERPL-CCNC: 132 U/L (ref 55–135)
ALT SERPL W/O P-5'-P-CCNC: 58 U/L (ref 10–44)
ANION GAP SERPL CALC-SCNC: 11 MMOL/L (ref 8–16)
AST SERPL-CCNC: 27 U/L (ref 10–40)
BASOPHILS # BLD AUTO: 0.07 K/UL (ref 0–0.2)
BASOPHILS NFR BLD: 0.8 % (ref 0–1.9)
BILIRUB SERPL-MCNC: 0.4 MG/DL (ref 0.1–1)
BUN SERPL-MCNC: 7 MG/DL (ref 6–20)
CALCIUM SERPL-MCNC: 9.5 MG/DL (ref 8.7–10.5)
CHLORIDE SERPL-SCNC: 107 MMOL/L (ref 95–110)
CO2 SERPL-SCNC: 26 MMOL/L (ref 23–29)
CREAT SERPL-MCNC: 0.8 MG/DL (ref 0.5–1.4)
CRP SERPL-MCNC: 6.8 MG/L (ref 0–8.2)
DIFFERENTIAL METHOD: ABNORMAL
EOSINOPHIL # BLD AUTO: 0.3 K/UL (ref 0–0.5)
EOSINOPHIL NFR BLD: 3.5 % (ref 0–8)
ERYTHROCYTE [DISTWIDTH] IN BLOOD BY AUTOMATED COUNT: 14.1 % (ref 11.5–14.5)
ERYTHROCYTE [SEDIMENTATION RATE] IN BLOOD BY WESTERGREN METHOD: 18 MM/HR (ref 0–36)
EST. GFR  (AFRICAN AMERICAN): >60 ML/MIN/1.73 M^2
EST. GFR  (NON AFRICAN AMERICAN): >60 ML/MIN/1.73 M^2
GLUCOSE SERPL-MCNC: 80 MG/DL (ref 70–110)
HCT VFR BLD AUTO: 42.7 % (ref 37–48.5)
HGB BLD-MCNC: 13.4 G/DL (ref 12–16)
IMM GRANULOCYTES # BLD AUTO: 0.02 K/UL (ref 0–0.04)
IMM GRANULOCYTES NFR BLD AUTO: 0.2 % (ref 0–0.5)
LYMPHOCYTES # BLD AUTO: 2.2 K/UL (ref 1–4.8)
LYMPHOCYTES NFR BLD: 26.7 % (ref 18–48)
MCH RBC QN AUTO: 30.7 PG (ref 27–31)
MCHC RBC AUTO-ENTMCNC: 31.4 G/DL (ref 32–36)
MCV RBC AUTO: 98 FL (ref 82–98)
MONOCYTES # BLD AUTO: 0.4 K/UL (ref 0.3–1)
MONOCYTES NFR BLD: 5.2 % (ref 4–15)
NEUTROPHILS # BLD AUTO: 5.3 K/UL (ref 1.8–7.7)
NEUTROPHILS NFR BLD: 63.6 % (ref 38–73)
NRBC BLD-RTO: 0 /100 WBC
PLATELET # BLD AUTO: 345 K/UL (ref 150–350)
PMV BLD AUTO: 10.8 FL (ref 9.2–12.9)
POTASSIUM SERPL-SCNC: 4.2 MMOL/L (ref 3.5–5.1)
PROT SERPL-MCNC: 6.7 G/DL (ref 6–8.4)
RBC # BLD AUTO: 4.37 M/UL (ref 4–5.4)
SODIUM SERPL-SCNC: 144 MMOL/L (ref 136–145)
WBC # BLD AUTO: 8.3 K/UL (ref 3.9–12.7)

## 2021-02-20 PROCEDURE — 85652 RBC SED RATE AUTOMATED: CPT

## 2021-02-20 PROCEDURE — 80053 COMPREHEN METABOLIC PANEL: CPT

## 2021-02-20 PROCEDURE — 85025 COMPLETE CBC W/AUTO DIFF WBC: CPT

## 2021-02-20 PROCEDURE — 86140 C-REACTIVE PROTEIN: CPT

## 2021-02-20 PROCEDURE — 36415 COLL VENOUS BLD VENIPUNCTURE: CPT | Mod: PO

## 2021-02-23 ENCOUNTER — PATIENT MESSAGE (OUTPATIENT)
Dept: RHEUMATOLOGY | Facility: CLINIC | Age: 61
End: 2021-02-23

## 2021-02-25 ENCOUNTER — SPECIALTY PHARMACY (OUTPATIENT)
Dept: PHARMACY | Facility: CLINIC | Age: 61
End: 2021-02-25

## 2021-03-27 ENCOUNTER — SPECIALTY PHARMACY (OUTPATIENT)
Dept: PHARMACY | Facility: CLINIC | Age: 61
End: 2021-03-27

## 2021-03-29 ENCOUNTER — HOSPITAL ENCOUNTER (OUTPATIENT)
Dept: RADIOLOGY | Facility: CLINIC | Age: 61
Discharge: HOME | End: 2021-03-29
Attending: STUDENT IN AN ORGANIZED HEALTH CARE EDUCATION/TRAINING PROGRAM
Payer: COMMERCIAL

## 2021-03-29 DIAGNOSIS — M85.80 OSTEOPENIA, UNSPECIFIED LOCATION: ICD-10-CM

## 2021-03-29 DIAGNOSIS — M05.79 RHEUMATOID ARTHRITIS INVOLVING MULTIPLE SITES WITH POSITIVE RHEUMATOID FACTOR: ICD-10-CM

## 2021-03-29 PROCEDURE — 77080 DEXA BONE DENSITY SPINE HIP: ICD-10-PCS | Mod: 26,,, | Performed by: INTERNAL MEDICINE

## 2021-03-29 PROCEDURE — 77080 DXA BONE DENSITY AXIAL: CPT | Mod: TC,PO

## 2021-03-29 PROCEDURE — 77080 DXA BONE DENSITY AXIAL: CPT | Mod: 26,,, | Performed by: INTERNAL MEDICINE

## 2021-04-12 ENCOUNTER — PATIENT MESSAGE (OUTPATIENT)
Dept: RESEARCH | Facility: HOSPITAL | Age: 61
End: 2021-04-12

## 2021-04-21 ENCOUNTER — OFFICE VISIT (OUTPATIENT)
Dept: RHEUMATOLOGY | Facility: CLINIC | Age: 61
End: 2021-04-21
Payer: COMMERCIAL

## 2021-04-21 ENCOUNTER — TELEPHONE (OUTPATIENT)
Dept: RHEUMATOLOGY | Facility: CLINIC | Age: 61
End: 2021-04-21

## 2021-04-21 ENCOUNTER — LAB VISIT (OUTPATIENT)
Dept: LAB | Facility: HOSPITAL | Age: 61
End: 2021-04-21
Attending: INTERNAL MEDICINE
Payer: COMMERCIAL

## 2021-04-21 ENCOUNTER — PATIENT MESSAGE (OUTPATIENT)
Dept: RHEUMATOLOGY | Facility: CLINIC | Age: 61
End: 2021-04-21

## 2021-04-21 VITALS
BODY MASS INDEX: 27.7 KG/M2 | HEART RATE: 74 BPM | WEIGHT: 151.44 LBS | SYSTOLIC BLOOD PRESSURE: 102 MMHG | DIASTOLIC BLOOD PRESSURE: 74 MMHG

## 2021-04-21 DIAGNOSIS — R74.8 ELEVATED LIVER ENZYMES: ICD-10-CM

## 2021-04-21 DIAGNOSIS — Z79.60 LONG-TERM USE OF IMMUNOSUPPRESSANT MEDICATION: ICD-10-CM

## 2021-04-21 DIAGNOSIS — M05.79 RHEUMATOID ARTHRITIS INVOLVING MULTIPLE SITES WITH POSITIVE RHEUMATOID FACTOR: Primary | ICD-10-CM

## 2021-04-21 DIAGNOSIS — M05.79 RHEUMATOID ARTHRITIS INVOLVING MULTIPLE SITES WITH POSITIVE RHEUMATOID FACTOR: ICD-10-CM

## 2021-04-21 DIAGNOSIS — M81.0 OSTEOPOROSIS, POSTMENOPAUSAL: ICD-10-CM

## 2021-04-21 DIAGNOSIS — M54.50 ACUTE MIDLINE LOW BACK PAIN, UNSPECIFIED WHETHER SCIATICA PRESENT: ICD-10-CM

## 2021-04-21 LAB
ALBUMIN SERPL BCP-MCNC: 3.7 G/DL (ref 3.5–5.2)
ALP SERPL-CCNC: 148 U/L (ref 55–135)
ALT SERPL W/O P-5'-P-CCNC: 30 U/L (ref 10–44)
AST SERPL-CCNC: 22 U/L (ref 10–40)
BILIRUB DIRECT SERPL-MCNC: 0.2 MG/DL (ref 0.1–0.3)
BILIRUB SERPL-MCNC: 0.6 MG/DL (ref 0.1–1)
PROT SERPL-MCNC: 7 G/DL (ref 6–8.4)

## 2021-04-21 PROCEDURE — 3008F PR BODY MASS INDEX (BMI) DOCUMENTED: ICD-10-PCS | Mod: CPTII,S$GLB,, | Performed by: INTERNAL MEDICINE

## 2021-04-21 PROCEDURE — 99214 OFFICE O/P EST MOD 30 MIN: CPT | Mod: S$GLB,,, | Performed by: INTERNAL MEDICINE

## 2021-04-21 PROCEDURE — 1125F AMNT PAIN NOTED PAIN PRSNT: CPT | Mod: S$GLB,,, | Performed by: INTERNAL MEDICINE

## 2021-04-21 PROCEDURE — 3008F BODY MASS INDEX DOCD: CPT | Mod: CPTII,S$GLB,, | Performed by: INTERNAL MEDICINE

## 2021-04-21 PROCEDURE — 99999 PR PBB SHADOW E&M-EST. PATIENT-LVL II: CPT | Mod: PBBFAC,,, | Performed by: INTERNAL MEDICINE

## 2021-04-21 PROCEDURE — 1125F PR PAIN SEVERITY QUANTIFIED, PAIN PRESENT: ICD-10-PCS | Mod: S$GLB,,, | Performed by: INTERNAL MEDICINE

## 2021-04-21 PROCEDURE — 36415 COLL VENOUS BLD VENIPUNCTURE: CPT | Performed by: INTERNAL MEDICINE

## 2021-04-21 PROCEDURE — 99214 PR OFFICE/OUTPT VISIT, EST, LEVL IV, 30-39 MIN: ICD-10-PCS | Mod: S$GLB,,, | Performed by: INTERNAL MEDICINE

## 2021-04-21 PROCEDURE — 99999 PR PBB SHADOW E&M-EST. PATIENT-LVL II: ICD-10-PCS | Mod: PBBFAC,,, | Performed by: INTERNAL MEDICINE

## 2021-04-21 PROCEDURE — 80076 HEPATIC FUNCTION PANEL: CPT | Performed by: INTERNAL MEDICINE

## 2021-04-21 RX ORDER — RISEDRONATE SODIUM 150 MG/1
150 TABLET, FILM COATED ORAL
Qty: 1 TABLET | Refills: 11 | Status: SHIPPED | OUTPATIENT
Start: 2021-04-21 | End: 2021-05-10

## 2021-04-21 RX ORDER — ALENDRONATE SODIUM 70 MG/1
70 TABLET ORAL
Qty: 4 TABLET | Refills: 11 | Status: SHIPPED | OUTPATIENT
Start: 2021-04-21 | End: 2021-04-21 | Stop reason: CLARIF

## 2021-04-21 ASSESSMENT — ROUTINE ASSESSMENT OF PATIENT INDEX DATA (RAPID3)
MDHAQ FUNCTION SCORE: 0.5
PATIENT GLOBAL ASSESSMENT SCORE: 6
AM STIFFNESS SCORE: 1, YES
PAIN SCORE: 5
PSYCHOLOGICAL DISTRESS SCORE: 3.3
FATIGUE SCORE: 7
TOTAL RAPID3 SCORE: 4.22

## 2021-04-26 ENCOUNTER — SPECIALTY PHARMACY (OUTPATIENT)
Dept: PHARMACY | Facility: CLINIC | Age: 61
End: 2021-04-26

## 2021-05-06 ENCOUNTER — LAB VISIT (OUTPATIENT)
Dept: LAB | Facility: HOSPITAL | Age: 61
End: 2021-05-06
Attending: INTERNAL MEDICINE
Payer: COMMERCIAL

## 2021-05-06 DIAGNOSIS — Z00.00 HEALTHCARE MAINTENANCE: ICD-10-CM

## 2021-05-06 LAB
25(OH)D3+25(OH)D2 SERPL-MCNC: 19 NG/ML (ref 30–96)
ALBUMIN SERPL BCP-MCNC: 3.7 G/DL (ref 3.5–5.2)
ALP SERPL-CCNC: 138 U/L (ref 55–135)
ALT SERPL W/O P-5'-P-CCNC: 35 U/L (ref 10–44)
ANION GAP SERPL CALC-SCNC: 7 MMOL/L (ref 8–16)
AST SERPL-CCNC: 20 U/L (ref 10–40)
BILIRUB SERPL-MCNC: 0.7 MG/DL (ref 0.1–1)
BUN SERPL-MCNC: 7 MG/DL (ref 6–20)
CALCIUM SERPL-MCNC: 9.1 MG/DL (ref 8.7–10.5)
CHLORIDE SERPL-SCNC: 108 MMOL/L (ref 95–110)
CHOLEST SERPL-MCNC: 197 MG/DL (ref 120–199)
CHOLEST/HDLC SERPL: 5.6 {RATIO} (ref 2–5)
CO2 SERPL-SCNC: 28 MMOL/L (ref 23–29)
CREAT SERPL-MCNC: 0.7 MG/DL (ref 0.5–1.4)
EST. GFR  (AFRICAN AMERICAN): >60 ML/MIN/1.73 M^2
EST. GFR  (NON AFRICAN AMERICAN): >60 ML/MIN/1.73 M^2
GLUCOSE SERPL-MCNC: 89 MG/DL (ref 70–110)
HDLC SERPL-MCNC: 35 MG/DL (ref 40–75)
HDLC SERPL: 17.8 % (ref 20–50)
LDLC SERPL CALC-MCNC: 135.4 MG/DL (ref 63–159)
NONHDLC SERPL-MCNC: 162 MG/DL
POTASSIUM SERPL-SCNC: 3.7 MMOL/L (ref 3.5–5.1)
PROT SERPL-MCNC: 6.9 G/DL (ref 6–8.4)
SODIUM SERPL-SCNC: 143 MMOL/L (ref 136–145)
T4 FREE SERPL-MCNC: 1.12 NG/DL (ref 0.71–1.51)
TRIGL SERPL-MCNC: 133 MG/DL (ref 30–150)
TSH SERPL DL<=0.005 MIU/L-ACNC: 5.72 UIU/ML (ref 0.4–4)

## 2021-05-06 PROCEDURE — 80061 LIPID PANEL: CPT | Performed by: INTERNAL MEDICINE

## 2021-05-06 PROCEDURE — 36415 COLL VENOUS BLD VENIPUNCTURE: CPT | Mod: PO | Performed by: INTERNAL MEDICINE

## 2021-05-06 PROCEDURE — 84443 ASSAY THYROID STIM HORMONE: CPT | Performed by: INTERNAL MEDICINE

## 2021-05-06 PROCEDURE — 82306 VITAMIN D 25 HYDROXY: CPT | Performed by: INTERNAL MEDICINE

## 2021-05-06 PROCEDURE — 80053 COMPREHEN METABOLIC PANEL: CPT | Performed by: INTERNAL MEDICINE

## 2021-05-06 PROCEDURE — 84439 ASSAY OF FREE THYROXINE: CPT | Performed by: INTERNAL MEDICINE

## 2021-05-10 ENCOUNTER — OFFICE VISIT (OUTPATIENT)
Dept: FAMILY MEDICINE | Facility: CLINIC | Age: 61
End: 2021-05-10
Payer: COMMERCIAL

## 2021-05-10 VITALS
SYSTOLIC BLOOD PRESSURE: 118 MMHG | HEIGHT: 62 IN | OXYGEN SATURATION: 95 % | TEMPERATURE: 98 F | RESPIRATION RATE: 18 BRPM | WEIGHT: 150.38 LBS | HEART RATE: 67 BPM | BODY MASS INDEX: 27.67 KG/M2 | DIASTOLIC BLOOD PRESSURE: 70 MMHG

## 2021-05-10 DIAGNOSIS — R79.89 ELEVATED TSH: Primary | ICD-10-CM

## 2021-05-10 DIAGNOSIS — E78.5 HYPERLIPIDEMIA, UNSPECIFIED HYPERLIPIDEMIA TYPE: ICD-10-CM

## 2021-05-10 DIAGNOSIS — E03.9 HYPOTHYROIDISM, UNSPECIFIED TYPE: ICD-10-CM

## 2021-05-10 DIAGNOSIS — E55.9 VITAMIN D DEFICIENCY: ICD-10-CM

## 2021-05-10 DIAGNOSIS — Z12.11 COLON CANCER SCREENING: ICD-10-CM

## 2021-05-10 PROCEDURE — 99214 PR OFFICE/OUTPT VISIT, EST, LEVL IV, 30-39 MIN: ICD-10-PCS | Mod: S$GLB,,, | Performed by: INTERNAL MEDICINE

## 2021-05-10 PROCEDURE — 99999 PR PBB SHADOW E&M-EST. PATIENT-LVL III: ICD-10-PCS | Mod: PBBFAC,,, | Performed by: INTERNAL MEDICINE

## 2021-05-10 PROCEDURE — 99214 OFFICE O/P EST MOD 30 MIN: CPT | Mod: S$GLB,,, | Performed by: INTERNAL MEDICINE

## 2021-05-10 PROCEDURE — 3008F PR BODY MASS INDEX (BMI) DOCUMENTED: ICD-10-PCS | Mod: CPTII,S$GLB,, | Performed by: INTERNAL MEDICINE

## 2021-05-10 PROCEDURE — 99999 PR PBB SHADOW E&M-EST. PATIENT-LVL III: CPT | Mod: PBBFAC,,, | Performed by: INTERNAL MEDICINE

## 2021-05-10 PROCEDURE — 3008F BODY MASS INDEX DOCD: CPT | Mod: CPTII,S$GLB,, | Performed by: INTERNAL MEDICINE

## 2021-05-10 RX ORDER — VIT C/E/ZN/COPPR/LUTEIN/ZEAXAN 250MG-90MG
1000 CAPSULE ORAL DAILY
Qty: 90 CAPSULE | Refills: 1 | Status: SHIPPED | OUTPATIENT
Start: 2021-05-10 | End: 2022-05-16 | Stop reason: ALTCHOICE

## 2021-05-10 RX ORDER — LEVOTHYROXINE SODIUM 100 UG/1
100 TABLET ORAL DAILY
Qty: 30 TABLET | Refills: 11 | Status: SHIPPED | OUTPATIENT
Start: 2021-05-10 | End: 2021-12-09 | Stop reason: SDUPTHER

## 2021-05-10 RX ORDER — ALENDRONATE SODIUM 70 MG/1
70 TABLET ORAL
COMMUNITY
End: 2022-05-16

## 2021-05-28 ENCOUNTER — SPECIALTY PHARMACY (OUTPATIENT)
Dept: PHARMACY | Facility: CLINIC | Age: 61
End: 2021-05-28

## 2021-06-25 ENCOUNTER — PATIENT MESSAGE (OUTPATIENT)
Dept: PHARMACY | Facility: CLINIC | Age: 61
End: 2021-06-25

## 2021-06-25 ENCOUNTER — SPECIALTY PHARMACY (OUTPATIENT)
Dept: PHARMACY | Facility: CLINIC | Age: 61
End: 2021-06-25

## 2021-07-06 ENCOUNTER — LAB VISIT (OUTPATIENT)
Dept: LAB | Facility: HOSPITAL | Age: 61
End: 2021-07-06
Attending: INTERNAL MEDICINE
Payer: COMMERCIAL

## 2021-07-06 DIAGNOSIS — R79.89 ELEVATED TSH: ICD-10-CM

## 2021-07-06 DIAGNOSIS — E78.5 HYPERLIPIDEMIA, UNSPECIFIED HYPERLIPIDEMIA TYPE: ICD-10-CM

## 2021-07-06 LAB
ALBUMIN SERPL BCP-MCNC: 3.7 G/DL (ref 3.5–5.2)
ALP SERPL-CCNC: 130 U/L (ref 55–135)
ALT SERPL W/O P-5'-P-CCNC: 28 U/L (ref 10–44)
ANION GAP SERPL CALC-SCNC: 8 MMOL/L (ref 8–16)
AST SERPL-CCNC: 15 U/L (ref 10–40)
BILIRUB SERPL-MCNC: 0.4 MG/DL (ref 0.1–1)
BUN SERPL-MCNC: 9 MG/DL (ref 6–20)
CALCIUM SERPL-MCNC: 9 MG/DL (ref 8.7–10.5)
CHLORIDE SERPL-SCNC: 107 MMOL/L (ref 95–110)
CO2 SERPL-SCNC: 28 MMOL/L (ref 23–29)
CREAT SERPL-MCNC: 0.8 MG/DL (ref 0.5–1.4)
EST. GFR  (AFRICAN AMERICAN): >60 ML/MIN/1.73 M^2
EST. GFR  (NON AFRICAN AMERICAN): >60 ML/MIN/1.73 M^2
GLUCOSE SERPL-MCNC: 98 MG/DL (ref 70–110)
POTASSIUM SERPL-SCNC: 4.3 MMOL/L (ref 3.5–5.1)
PROT SERPL-MCNC: 6.9 G/DL (ref 6–8.4)
SODIUM SERPL-SCNC: 143 MMOL/L (ref 136–145)
T4 FREE SERPL-MCNC: 1.36 NG/DL (ref 0.71–1.51)
TSH SERPL DL<=0.005 MIU/L-ACNC: 0.83 UIU/ML (ref 0.4–4)

## 2021-07-06 PROCEDURE — 36415 COLL VENOUS BLD VENIPUNCTURE: CPT | Mod: PO | Performed by: INTERNAL MEDICINE

## 2021-07-06 PROCEDURE — 84439 ASSAY OF FREE THYROXINE: CPT | Performed by: INTERNAL MEDICINE

## 2021-07-06 PROCEDURE — 84443 ASSAY THYROID STIM HORMONE: CPT | Performed by: INTERNAL MEDICINE

## 2021-07-06 PROCEDURE — 80053 COMPREHEN METABOLIC PANEL: CPT | Performed by: INTERNAL MEDICINE

## 2021-07-12 ENCOUNTER — TELEPHONE (OUTPATIENT)
Dept: ENDOSCOPY | Facility: HOSPITAL | Age: 61
End: 2021-07-12

## 2021-07-12 ENCOUNTER — PATIENT MESSAGE (OUTPATIENT)
Dept: ENDOSCOPY | Facility: HOSPITAL | Age: 61
End: 2021-07-12

## 2021-07-12 DIAGNOSIS — Z12.11 SPECIAL SCREENING FOR MALIGNANT NEOPLASMS, COLON: Primary | ICD-10-CM

## 2021-07-12 RX ORDER — SODIUM, POTASSIUM,MAG SULFATES 17.5-3.13G
SOLUTION, RECONSTITUTED, ORAL ORAL
Qty: 1 BOTTLE | Refills: 0 | Status: SHIPPED | OUTPATIENT
Start: 2021-07-12 | End: 2022-05-16

## 2021-07-20 ENCOUNTER — OFFICE VISIT (OUTPATIENT)
Dept: RHEUMATOLOGY | Facility: CLINIC | Age: 61
End: 2021-07-20
Payer: COMMERCIAL

## 2021-07-20 ENCOUNTER — LAB VISIT (OUTPATIENT)
Dept: LAB | Facility: HOSPITAL | Age: 61
End: 2021-07-20
Attending: INTERNAL MEDICINE
Payer: COMMERCIAL

## 2021-07-20 VITALS
DIASTOLIC BLOOD PRESSURE: 73 MMHG | BODY MASS INDEX: 28.03 KG/M2 | HEART RATE: 81 BPM | WEIGHT: 152.31 LBS | HEIGHT: 62 IN | SYSTOLIC BLOOD PRESSURE: 115 MMHG

## 2021-07-20 DIAGNOSIS — Z79.60 LONG-TERM USE OF IMMUNOSUPPRESSANT MEDICATION: ICD-10-CM

## 2021-07-20 DIAGNOSIS — M05.79 RHEUMATOID ARTHRITIS INVOLVING MULTIPLE SITES WITH POSITIVE RHEUMATOID FACTOR: Primary | ICD-10-CM

## 2021-07-20 DIAGNOSIS — M15.9 PRIMARY OSTEOARTHRITIS INVOLVING MULTIPLE JOINTS: ICD-10-CM

## 2021-07-20 DIAGNOSIS — M05.79 RHEUMATOID ARTHRITIS INVOLVING MULTIPLE SITES WITH POSITIVE RHEUMATOID FACTOR: ICD-10-CM

## 2021-07-20 DIAGNOSIS — M85.80 OSTEOPENIA, UNSPECIFIED LOCATION: ICD-10-CM

## 2021-07-20 PROBLEM — M19.90 DJD (DEGENERATIVE JOINT DISEASE): Status: RESOLVED | Noted: 2019-03-14 | Resolved: 2021-07-20

## 2021-07-20 PROBLEM — M15.0 PRIMARY OSTEOARTHRITIS INVOLVING MULTIPLE JOINTS: Status: ACTIVE | Noted: 2021-07-20

## 2021-07-20 LAB
ALBUMIN SERPL BCP-MCNC: 3.6 G/DL (ref 3.5–5.2)
ALP SERPL-CCNC: 128 U/L (ref 55–135)
ALT SERPL W/O P-5'-P-CCNC: 25 U/L (ref 10–44)
ANION GAP SERPL CALC-SCNC: 9 MMOL/L (ref 8–16)
AST SERPL-CCNC: 16 U/L (ref 10–40)
BASOPHILS # BLD AUTO: 0.04 K/UL (ref 0–0.2)
BASOPHILS NFR BLD: 0.4 % (ref 0–1.9)
BILIRUB SERPL-MCNC: 0.4 MG/DL (ref 0.1–1)
BUN SERPL-MCNC: 8 MG/DL (ref 6–20)
CALCIUM SERPL-MCNC: 9.1 MG/DL (ref 8.7–10.5)
CHLORIDE SERPL-SCNC: 108 MMOL/L (ref 95–110)
CO2 SERPL-SCNC: 25 MMOL/L (ref 23–29)
CREAT SERPL-MCNC: 0.8 MG/DL (ref 0.5–1.4)
CRP SERPL-MCNC: 7.9 MG/L (ref 0–8.2)
DIFFERENTIAL METHOD: ABNORMAL
EOSINOPHIL # BLD AUTO: 0.3 K/UL (ref 0–0.5)
EOSINOPHIL NFR BLD: 2.5 % (ref 0–8)
ERYTHROCYTE [DISTWIDTH] IN BLOOD BY AUTOMATED COUNT: 14.5 % (ref 11.5–14.5)
ERYTHROCYTE [SEDIMENTATION RATE] IN BLOOD BY WESTERGREN METHOD: 18 MM/HR (ref 0–36)
EST. GFR  (AFRICAN AMERICAN): >60 ML/MIN/1.73 M^2
EST. GFR  (NON AFRICAN AMERICAN): >60 ML/MIN/1.73 M^2
GLUCOSE SERPL-MCNC: 99 MG/DL (ref 70–110)
HCT VFR BLD AUTO: 42.1 % (ref 37–48.5)
HGB BLD-MCNC: 13.5 G/DL (ref 12–16)
IMM GRANULOCYTES # BLD AUTO: 0.04 K/UL (ref 0–0.04)
IMM GRANULOCYTES NFR BLD AUTO: 0.4 % (ref 0–0.5)
LYMPHOCYTES # BLD AUTO: 1.6 K/UL (ref 1–4.8)
LYMPHOCYTES NFR BLD: 16 % (ref 18–48)
MCH RBC QN AUTO: 30.1 PG (ref 27–31)
MCHC RBC AUTO-ENTMCNC: 32.1 G/DL (ref 32–36)
MCV RBC AUTO: 94 FL (ref 82–98)
MONOCYTES # BLD AUTO: 0.7 K/UL (ref 0.3–1)
MONOCYTES NFR BLD: 7.4 % (ref 4–15)
NEUTROPHILS # BLD AUTO: 7.2 K/UL (ref 1.8–7.7)
NEUTROPHILS NFR BLD: 73.3 % (ref 38–73)
NRBC BLD-RTO: 0 /100 WBC
PLATELET # BLD AUTO: 286 K/UL (ref 150–450)
PMV BLD AUTO: 9.6 FL (ref 9.2–12.9)
POTASSIUM SERPL-SCNC: 4.2 MMOL/L (ref 3.5–5.1)
PROT SERPL-MCNC: 6.7 G/DL (ref 6–8.4)
RBC # BLD AUTO: 4.48 M/UL (ref 4–5.4)
SODIUM SERPL-SCNC: 142 MMOL/L (ref 136–145)
WBC # BLD AUTO: 9.85 K/UL (ref 3.9–12.7)

## 2021-07-20 PROCEDURE — 1125F PR PAIN SEVERITY QUANTIFIED, PAIN PRESENT: ICD-10-PCS | Mod: CPTII,S$GLB,, | Performed by: INTERNAL MEDICINE

## 2021-07-20 PROCEDURE — 86140 C-REACTIVE PROTEIN: CPT | Performed by: INTERNAL MEDICINE

## 2021-07-20 PROCEDURE — 99999 PR PBB SHADOW E&M-EST. PATIENT-LVL III: CPT | Mod: PBBFAC,,, | Performed by: INTERNAL MEDICINE

## 2021-07-20 PROCEDURE — 3008F BODY MASS INDEX DOCD: CPT | Mod: CPTII,S$GLB,, | Performed by: INTERNAL MEDICINE

## 2021-07-20 PROCEDURE — 36415 COLL VENOUS BLD VENIPUNCTURE: CPT | Performed by: INTERNAL MEDICINE

## 2021-07-20 PROCEDURE — 80053 COMPREHEN METABOLIC PANEL: CPT | Performed by: INTERNAL MEDICINE

## 2021-07-20 PROCEDURE — 99999 PR PBB SHADOW E&M-EST. PATIENT-LVL III: ICD-10-PCS | Mod: PBBFAC,,, | Performed by: INTERNAL MEDICINE

## 2021-07-20 PROCEDURE — 99214 PR OFFICE/OUTPT VISIT, EST, LEVL IV, 30-39 MIN: ICD-10-PCS | Mod: S$GLB,,, | Performed by: INTERNAL MEDICINE

## 2021-07-20 PROCEDURE — 99214 OFFICE O/P EST MOD 30 MIN: CPT | Mod: S$GLB,,, | Performed by: INTERNAL MEDICINE

## 2021-07-20 PROCEDURE — 3008F PR BODY MASS INDEX (BMI) DOCUMENTED: ICD-10-PCS | Mod: CPTII,S$GLB,, | Performed by: INTERNAL MEDICINE

## 2021-07-20 PROCEDURE — 85025 COMPLETE CBC W/AUTO DIFF WBC: CPT | Performed by: INTERNAL MEDICINE

## 2021-07-20 PROCEDURE — 85652 RBC SED RATE AUTOMATED: CPT | Performed by: INTERNAL MEDICINE

## 2021-07-20 PROCEDURE — 1125F AMNT PAIN NOTED PAIN PRSNT: CPT | Mod: CPTII,S$GLB,, | Performed by: INTERNAL MEDICINE

## 2021-07-27 ENCOUNTER — SPECIALTY PHARMACY (OUTPATIENT)
Dept: PHARMACY | Facility: CLINIC | Age: 61
End: 2021-07-27

## 2021-08-10 ENCOUNTER — PATIENT MESSAGE (OUTPATIENT)
Dept: ENDOSCOPY | Facility: HOSPITAL | Age: 61
End: 2021-08-10

## 2021-08-10 DIAGNOSIS — Z01.818 PRE-OP TESTING: ICD-10-CM

## 2021-08-23 ENCOUNTER — PATIENT MESSAGE (OUTPATIENT)
Dept: PHARMACY | Facility: CLINIC | Age: 61
End: 2021-08-23

## 2021-08-23 ENCOUNTER — SPECIALTY PHARMACY (OUTPATIENT)
Dept: PHARMACY | Facility: CLINIC | Age: 61
End: 2021-08-23

## 2021-09-14 ENCOUNTER — PATIENT MESSAGE (OUTPATIENT)
Dept: ENDOSCOPY | Facility: HOSPITAL | Age: 61
End: 2021-09-14

## 2021-09-15 DIAGNOSIS — Z12.31 OTHER SCREENING MAMMOGRAM: ICD-10-CM

## 2021-09-18 ENCOUNTER — SPECIALTY PHARMACY (OUTPATIENT)
Dept: PHARMACY | Facility: CLINIC | Age: 61
End: 2021-09-18

## 2021-09-28 ENCOUNTER — OFFICE VISIT (OUTPATIENT)
Dept: URGENT CARE | Facility: CLINIC | Age: 61
End: 2021-09-28
Payer: COMMERCIAL

## 2021-09-28 VITALS
BODY MASS INDEX: 27.6 KG/M2 | DIASTOLIC BLOOD PRESSURE: 85 MMHG | WEIGHT: 150 LBS | RESPIRATION RATE: 20 BRPM | OXYGEN SATURATION: 96 % | HEIGHT: 62 IN | HEART RATE: 73 BPM | SYSTOLIC BLOOD PRESSURE: 127 MMHG | TEMPERATURE: 98 F

## 2021-09-28 DIAGNOSIS — B02.9 HERPES ZOSTER WITHOUT COMPLICATION: Primary | ICD-10-CM

## 2021-09-28 PROCEDURE — 99214 OFFICE O/P EST MOD 30 MIN: CPT | Mod: S$GLB,,, | Performed by: PHYSICIAN ASSISTANT

## 2021-09-28 PROCEDURE — 3008F PR BODY MASS INDEX (BMI) DOCUMENTED: ICD-10-PCS | Mod: CPTII,S$GLB,, | Performed by: PHYSICIAN ASSISTANT

## 2021-09-28 PROCEDURE — 3074F PR MOST RECENT SYSTOLIC BLOOD PRESSURE < 130 MM HG: ICD-10-PCS | Mod: CPTII,S$GLB,, | Performed by: PHYSICIAN ASSISTANT

## 2021-09-28 PROCEDURE — 3074F SYST BP LT 130 MM HG: CPT | Mod: CPTII,S$GLB,, | Performed by: PHYSICIAN ASSISTANT

## 2021-09-28 PROCEDURE — 1159F MED LIST DOCD IN RCRD: CPT | Mod: CPTII,S$GLB,, | Performed by: PHYSICIAN ASSISTANT

## 2021-09-28 PROCEDURE — 99214 PR OFFICE/OUTPT VISIT, EST, LEVL IV, 30-39 MIN: ICD-10-PCS | Mod: S$GLB,,, | Performed by: PHYSICIAN ASSISTANT

## 2021-09-28 PROCEDURE — 3079F DIAST BP 80-89 MM HG: CPT | Mod: CPTII,S$GLB,, | Performed by: PHYSICIAN ASSISTANT

## 2021-09-28 PROCEDURE — 3079F PR MOST RECENT DIASTOLIC BLOOD PRESSURE 80-89 MM HG: ICD-10-PCS | Mod: CPTII,S$GLB,, | Performed by: PHYSICIAN ASSISTANT

## 2021-09-28 PROCEDURE — 1160F PR REVIEW ALL MEDS BY PRESCRIBER/CLIN PHARMACIST DOCUMENTED: ICD-10-PCS | Mod: CPTII,S$GLB,, | Performed by: PHYSICIAN ASSISTANT

## 2021-09-28 PROCEDURE — 1159F PR MEDICATION LIST DOCUMENTED IN MEDICAL RECORD: ICD-10-PCS | Mod: CPTII,S$GLB,, | Performed by: PHYSICIAN ASSISTANT

## 2021-09-28 PROCEDURE — 1160F RVW MEDS BY RX/DR IN RCRD: CPT | Mod: CPTII,S$GLB,, | Performed by: PHYSICIAN ASSISTANT

## 2021-09-28 PROCEDURE — 3008F BODY MASS INDEX DOCD: CPT | Mod: CPTII,S$GLB,, | Performed by: PHYSICIAN ASSISTANT

## 2021-09-28 RX ORDER — TRAMADOL HYDROCHLORIDE 50 MG/1
50 TABLET ORAL EVERY 6 HOURS PRN
Qty: 12 TABLET | Refills: 0 | Status: SHIPPED | OUTPATIENT
Start: 2021-09-28 | End: 2022-04-20

## 2021-09-28 RX ORDER — MUPIROCIN 20 MG/G
OINTMENT TOPICAL
Qty: 22 G | Refills: 1 | Status: SHIPPED | OUTPATIENT
Start: 2021-09-28

## 2021-09-28 RX ORDER — VALACYCLOVIR HYDROCHLORIDE 1 G/1
1000 TABLET, FILM COATED ORAL 3 TIMES DAILY
Qty: 21 TABLET | Refills: 0 | Status: SHIPPED | OUTPATIENT
Start: 2021-09-28 | End: 2022-05-16

## 2021-10-01 ENCOUNTER — PATIENT MESSAGE (OUTPATIENT)
Dept: RHEUMATOLOGY | Facility: CLINIC | Age: 61
End: 2021-10-01

## 2021-10-18 ENCOUNTER — SPECIALTY PHARMACY (OUTPATIENT)
Dept: PHARMACY | Facility: CLINIC | Age: 61
End: 2021-10-18

## 2021-10-29 ENCOUNTER — LAB VISIT (OUTPATIENT)
Dept: FAMILY MEDICINE | Facility: CLINIC | Age: 61
End: 2021-10-29
Payer: COMMERCIAL

## 2021-10-29 DIAGNOSIS — Z01.818 PRE-OP TESTING: ICD-10-CM

## 2021-10-29 PROCEDURE — U0003 INFECTIOUS AGENT DETECTION BY NUCLEIC ACID (DNA OR RNA); SEVERE ACUTE RESPIRATORY SYNDROME CORONAVIRUS 2 (SARS-COV-2) (CORONAVIRUS DISEASE [COVID-19]), AMPLIFIED PROBE TECHNIQUE, MAKING USE OF HIGH THROUGHPUT TECHNOLOGIES AS DESCRIBED BY CMS-2020-01-R: HCPCS | Performed by: FAMILY MEDICINE

## 2021-10-29 PROCEDURE — U0005 INFEC AGEN DETEC AMPLI PROBE: HCPCS | Performed by: FAMILY MEDICINE

## 2021-10-30 LAB
SARS-COV-2 RNA RESP QL NAA+PROBE: NOT DETECTED
SARS-COV-2- CYCLE NUMBER: NORMAL

## 2021-11-01 ENCOUNTER — ANESTHESIA EVENT (OUTPATIENT)
Dept: ENDOSCOPY | Facility: HOSPITAL | Age: 61
End: 2021-11-01
Payer: COMMERCIAL

## 2021-11-01 ENCOUNTER — ANESTHESIA (OUTPATIENT)
Dept: ENDOSCOPY | Facility: HOSPITAL | Age: 61
End: 2021-11-01
Payer: COMMERCIAL

## 2021-11-01 ENCOUNTER — HOSPITAL ENCOUNTER (OUTPATIENT)
Facility: HOSPITAL | Age: 61
Discharge: HOME | End: 2021-11-01
Attending: STUDENT IN AN ORGANIZED HEALTH CARE EDUCATION/TRAINING PROGRAM | Admitting: STUDENT IN AN ORGANIZED HEALTH CARE EDUCATION/TRAINING PROGRAM
Payer: COMMERCIAL

## 2021-11-01 VITALS
RESPIRATION RATE: 18 BRPM | DIASTOLIC BLOOD PRESSURE: 66 MMHG | TEMPERATURE: 98 F | OXYGEN SATURATION: 98 % | HEART RATE: 78 BPM | SYSTOLIC BLOOD PRESSURE: 118 MMHG

## 2021-11-01 DIAGNOSIS — Z12.11 ENCOUNTER FOR SCREENING COLONOSCOPY: ICD-10-CM

## 2021-11-01 PROCEDURE — 63600175 PHARM REV CODE 636 W HCPCS: Performed by: REGISTERED NURSE

## 2021-11-01 PROCEDURE — 45380 COLONOSCOPY AND BIOPSY: CPT | Mod: 33,,, | Performed by: STUDENT IN AN ORGANIZED HEALTH CARE EDUCATION/TRAINING PROGRAM

## 2021-11-01 PROCEDURE — D9220A PRA ANESTHESIA: ICD-10-PCS | Mod: 33,,, | Performed by: REGISTERED NURSE

## 2021-11-01 PROCEDURE — 88305 TISSUE EXAM BY PATHOLOGIST: CPT | Performed by: PATHOLOGY

## 2021-11-01 PROCEDURE — D9220A PRA ANESTHESIA: ICD-10-PCS | Mod: 33,,, | Performed by: ANESTHESIOLOGY

## 2021-11-01 PROCEDURE — D9220A PRA ANESTHESIA: Mod: 33,,, | Performed by: ANESTHESIOLOGY

## 2021-11-01 PROCEDURE — D9220A PRA ANESTHESIA: Mod: 33,,, | Performed by: REGISTERED NURSE

## 2021-11-01 PROCEDURE — 88305 TISSUE EXAM BY PATHOLOGIST: CPT | Mod: 26,,, | Performed by: PATHOLOGY

## 2021-11-01 PROCEDURE — 27201012 HC FORCEPS, HOT/COLD, DISP: Performed by: STUDENT IN AN ORGANIZED HEALTH CARE EDUCATION/TRAINING PROGRAM

## 2021-11-01 PROCEDURE — 88305 TISSUE EXAM BY PATHOLOGIST: ICD-10-PCS | Mod: 26,,, | Performed by: PATHOLOGY

## 2021-11-01 PROCEDURE — 25000003 PHARM REV CODE 250: Performed by: ANESTHESIOLOGY

## 2021-11-01 PROCEDURE — 37000009 HC ANESTHESIA EA ADD 15 MINS: Performed by: STUDENT IN AN ORGANIZED HEALTH CARE EDUCATION/TRAINING PROGRAM

## 2021-11-01 PROCEDURE — 25000003 PHARM REV CODE 250: Performed by: REGISTERED NURSE

## 2021-11-01 PROCEDURE — 37000008 HC ANESTHESIA 1ST 15 MINUTES: Performed by: STUDENT IN AN ORGANIZED HEALTH CARE EDUCATION/TRAINING PROGRAM

## 2021-11-01 PROCEDURE — 45380 COLONOSCOPY AND BIOPSY: CPT | Mod: PT | Performed by: STUDENT IN AN ORGANIZED HEALTH CARE EDUCATION/TRAINING PROGRAM

## 2021-11-01 PROCEDURE — 45380 PR COLONOSCOPY,BIOPSY: ICD-10-PCS | Mod: 33,,, | Performed by: STUDENT IN AN ORGANIZED HEALTH CARE EDUCATION/TRAINING PROGRAM

## 2021-11-01 RX ORDER — PROPOFOL 10 MG/ML
INJECTION, EMULSION INTRAVENOUS
Status: DISCONTINUED
Start: 2021-11-01 | End: 2021-11-01 | Stop reason: HOSPADM

## 2021-11-01 RX ORDER — LIDOCAINE HYDROCHLORIDE 20 MG/ML
INJECTION, SOLUTION EPIDURAL; INFILTRATION; INTRACAUDAL; PERINEURAL
Status: DISCONTINUED
Start: 2021-11-01 | End: 2021-11-01 | Stop reason: HOSPADM

## 2021-11-01 RX ORDER — LIDOCAINE HCL/PF 100 MG/5ML
SYRINGE (ML) INTRAVENOUS
Status: DISCONTINUED | OUTPATIENT
Start: 2021-11-01 | End: 2021-11-01

## 2021-11-01 RX ORDER — SODIUM CHLORIDE 9 MG/ML
INJECTION, SOLUTION INTRAVENOUS ONCE
Status: COMPLETED | OUTPATIENT
Start: 2021-11-01 | End: 2021-11-01

## 2021-11-01 RX ORDER — PROPOFOL 10 MG/ML
INJECTION, EMULSION INTRAVENOUS
Status: DISCONTINUED | OUTPATIENT
Start: 2021-11-01 | End: 2021-11-01

## 2021-11-01 RX ORDER — PROPOFOL 10 MG/ML
INJECTION, EMULSION INTRAVENOUS
Status: COMPLETED
Start: 2021-11-01 | End: 2021-11-01

## 2021-11-01 RX ADMIN — PROPOFOL 40 MG: 10 INJECTION, EMULSION INTRAVENOUS at 08:11

## 2021-11-01 RX ADMIN — LIDOCAINE HYDROCHLORIDE 100 MG: 20 INJECTION, SOLUTION INTRAVENOUS at 08:11

## 2021-11-01 RX ADMIN — PROPOFOL 70 MG: 10 INJECTION, EMULSION INTRAVENOUS at 08:11

## 2021-11-01 RX ADMIN — PROPOFOL 30 MG: 10 INJECTION, EMULSION INTRAVENOUS at 08:11

## 2021-11-01 RX ADMIN — SODIUM CHLORIDE: 0.9 INJECTION, SOLUTION INTRAVENOUS at 08:11

## 2021-11-01 RX ADMIN — SODIUM CHLORIDE: 0.9 INJECTION, SOLUTION INTRAVENOUS at 07:11

## 2021-11-02 LAB
FINAL PATHOLOGIC DIAGNOSIS: NORMAL
GROSS: NORMAL
Lab: NORMAL

## 2021-11-15 ENCOUNTER — HOSPITAL ENCOUNTER (OUTPATIENT)
Dept: RADIOLOGY | Facility: HOSPITAL | Age: 61
Discharge: HOME | End: 2021-11-15
Attending: INTERNAL MEDICINE
Payer: COMMERCIAL

## 2021-11-15 ENCOUNTER — OFFICE VISIT (OUTPATIENT)
Dept: FAMILY MEDICINE | Facility: CLINIC | Age: 61
End: 2021-11-15
Payer: COMMERCIAL

## 2021-11-15 VITALS
WEIGHT: 152.56 LBS | RESPIRATION RATE: 18 BRPM | SYSTOLIC BLOOD PRESSURE: 122 MMHG | DIASTOLIC BLOOD PRESSURE: 70 MMHG | BODY MASS INDEX: 27.9 KG/M2 | HEART RATE: 91 BPM | OXYGEN SATURATION: 98 %

## 2021-11-15 VITALS — WEIGHT: 152 LBS | BODY MASS INDEX: 27.97 KG/M2 | HEIGHT: 62 IN

## 2021-11-15 DIAGNOSIS — K63.5 POLYP OF COLON, UNSPECIFIED PART OF COLON, UNSPECIFIED TYPE: ICD-10-CM

## 2021-11-15 DIAGNOSIS — Z12.31 OTHER SCREENING MAMMOGRAM: ICD-10-CM

## 2021-11-15 DIAGNOSIS — E78.5 HYPERLIPIDEMIA, UNSPECIFIED HYPERLIPIDEMIA TYPE: ICD-10-CM

## 2021-11-15 DIAGNOSIS — Z23 INFLUENZA VACCINE NEEDED: ICD-10-CM

## 2021-11-15 DIAGNOSIS — M05.79 RHEUMATOID ARTHRITIS INVOLVING MULTIPLE SITES WITH POSITIVE RHEUMATOID FACTOR: ICD-10-CM

## 2021-11-15 DIAGNOSIS — E03.9 ACQUIRED HYPOTHYROIDISM: ICD-10-CM

## 2021-11-15 DIAGNOSIS — D84.9 IMMUNOSUPPRESSION: ICD-10-CM

## 2021-11-15 DIAGNOSIS — M15.9 PRIMARY OSTEOARTHRITIS INVOLVING MULTIPLE JOINTS: ICD-10-CM

## 2021-11-15 DIAGNOSIS — Z00.00 HEALTHCARE MAINTENANCE: Primary | ICD-10-CM

## 2021-11-15 PROCEDURE — 99999 PR PBB SHADOW E&M-EST. PATIENT-LVL IV: CPT | Mod: PBBFAC,,, | Performed by: INTERNAL MEDICINE

## 2021-11-15 PROCEDURE — 1159F MED LIST DOCD IN RCRD: CPT | Mod: CPTII,S$GLB,, | Performed by: INTERNAL MEDICINE

## 2021-11-15 PROCEDURE — 77067 SCR MAMMO BI INCL CAD: CPT | Mod: 26,,, | Performed by: RADIOLOGY

## 2021-11-15 PROCEDURE — 90471 FLU VACCINE (QUAD) GREATER THAN OR EQUAL TO 3YO PRESERVATIVE FREE IM: ICD-10-PCS | Mod: S$GLB,,, | Performed by: INTERNAL MEDICINE

## 2021-11-15 PROCEDURE — 99999 PR PBB SHADOW E&M-EST. PATIENT-LVL IV: ICD-10-PCS | Mod: PBBFAC,,, | Performed by: INTERNAL MEDICINE

## 2021-11-15 PROCEDURE — 77063 MAMMO DIGITAL SCREENING BILAT WITH TOMO: ICD-10-PCS | Mod: 26,,, | Performed by: RADIOLOGY

## 2021-11-15 PROCEDURE — 3078F PR MOST RECENT DIASTOLIC BLOOD PRESSURE < 80 MM HG: ICD-10-PCS | Mod: CPTII,S$GLB,, | Performed by: INTERNAL MEDICINE

## 2021-11-15 PROCEDURE — 1160F RVW MEDS BY RX/DR IN RCRD: CPT | Mod: CPTII,S$GLB,, | Performed by: INTERNAL MEDICINE

## 2021-11-15 PROCEDURE — 99214 PR OFFICE/OUTPT VISIT, EST, LEVL IV, 30-39 MIN: ICD-10-PCS | Mod: 25,S$GLB,, | Performed by: INTERNAL MEDICINE

## 2021-11-15 PROCEDURE — 77067 MAMMO DIGITAL SCREENING BILAT WITH TOMO: ICD-10-PCS | Mod: 26,,, | Performed by: RADIOLOGY

## 2021-11-15 PROCEDURE — 3008F BODY MASS INDEX DOCD: CPT | Mod: CPTII,S$GLB,, | Performed by: INTERNAL MEDICINE

## 2021-11-15 PROCEDURE — 77067 SCR MAMMO BI INCL CAD: CPT | Mod: TC,PO

## 2021-11-15 PROCEDURE — 1159F PR MEDICATION LIST DOCUMENTED IN MEDICAL RECORD: ICD-10-PCS | Mod: CPTII,S$GLB,, | Performed by: INTERNAL MEDICINE

## 2021-11-15 PROCEDURE — 3078F DIAST BP <80 MM HG: CPT | Mod: CPTII,S$GLB,, | Performed by: INTERNAL MEDICINE

## 2021-11-15 PROCEDURE — 3008F PR BODY MASS INDEX (BMI) DOCUMENTED: ICD-10-PCS | Mod: CPTII,S$GLB,, | Performed by: INTERNAL MEDICINE

## 2021-11-15 PROCEDURE — 99214 OFFICE O/P EST MOD 30 MIN: CPT | Mod: 25,S$GLB,, | Performed by: INTERNAL MEDICINE

## 2021-11-15 PROCEDURE — 90686 FLU VACCINE (QUAD) GREATER THAN OR EQUAL TO 3YO PRESERVATIVE FREE IM: ICD-10-PCS | Mod: S$GLB,,, | Performed by: INTERNAL MEDICINE

## 2021-11-15 PROCEDURE — 77063 BREAST TOMOSYNTHESIS BI: CPT | Mod: 26,,, | Performed by: RADIOLOGY

## 2021-11-15 PROCEDURE — 1160F PR REVIEW ALL MEDS BY PRESCRIBER/CLIN PHARMACIST DOCUMENTED: ICD-10-PCS | Mod: CPTII,S$GLB,, | Performed by: INTERNAL MEDICINE

## 2021-11-15 PROCEDURE — 3074F SYST BP LT 130 MM HG: CPT | Mod: CPTII,S$GLB,, | Performed by: INTERNAL MEDICINE

## 2021-11-15 PROCEDURE — 90686 IIV4 VACC NO PRSV 0.5 ML IM: CPT | Mod: S$GLB,,, | Performed by: INTERNAL MEDICINE

## 2021-11-15 PROCEDURE — 3074F PR MOST RECENT SYSTOLIC BLOOD PRESSURE < 130 MM HG: ICD-10-PCS | Mod: CPTII,S$GLB,, | Performed by: INTERNAL MEDICINE

## 2021-11-15 PROCEDURE — 90471 IMMUNIZATION ADMIN: CPT | Mod: S$GLB,,, | Performed by: INTERNAL MEDICINE

## 2021-11-15 RX ORDER — TIZANIDINE 2 MG/1
2 TABLET ORAL EVERY 8 HOURS PRN
Qty: 60 TABLET | Refills: 3 | Status: SHIPPED | OUTPATIENT
Start: 2021-11-15 | End: 2022-03-15

## 2021-11-22 ENCOUNTER — LAB VISIT (OUTPATIENT)
Dept: LAB | Facility: HOSPITAL | Age: 61
End: 2021-11-22
Attending: INTERNAL MEDICINE
Payer: COMMERCIAL

## 2021-11-22 DIAGNOSIS — Z79.60 LONG-TERM USE OF IMMUNOSUPPRESSANT MEDICATION: ICD-10-CM

## 2021-11-22 DIAGNOSIS — M05.79 RHEUMATOID ARTHRITIS INVOLVING MULTIPLE SITES WITH POSITIVE RHEUMATOID FACTOR: ICD-10-CM

## 2021-11-22 LAB
ALBUMIN SERPL BCP-MCNC: 3.6 G/DL (ref 3.5–5.2)
ALP SERPL-CCNC: 127 U/L (ref 55–135)
ALT SERPL W/O P-5'-P-CCNC: 20 U/L (ref 10–44)
ANION GAP SERPL CALC-SCNC: 8 MMOL/L (ref 8–16)
AST SERPL-CCNC: 12 U/L (ref 10–40)
BASOPHILS # BLD AUTO: 0.06 K/UL (ref 0–0.2)
BASOPHILS NFR BLD: 0.7 % (ref 0–1.9)
BILIRUB SERPL-MCNC: 0.3 MG/DL (ref 0.1–1)
BUN SERPL-MCNC: 5 MG/DL (ref 6–20)
CALCIUM SERPL-MCNC: 9.3 MG/DL (ref 8.7–10.5)
CHLORIDE SERPL-SCNC: 108 MMOL/L (ref 95–110)
CO2 SERPL-SCNC: 27 MMOL/L (ref 23–29)
CREAT SERPL-MCNC: 0.7 MG/DL (ref 0.5–1.4)
CRP SERPL-MCNC: 8 MG/L (ref 0–8.2)
DIFFERENTIAL METHOD: ABNORMAL
EOSINOPHIL # BLD AUTO: 0.3 K/UL (ref 0–0.5)
EOSINOPHIL NFR BLD: 3.1 % (ref 0–8)
ERYTHROCYTE [DISTWIDTH] IN BLOOD BY AUTOMATED COUNT: 13.7 % (ref 11.5–14.5)
ERYTHROCYTE [SEDIMENTATION RATE] IN BLOOD BY WESTERGREN METHOD: 12 MM/HR (ref 0–36)
EST. GFR  (AFRICAN AMERICAN): >60 ML/MIN/1.73 M^2
EST. GFR  (NON AFRICAN AMERICAN): >60 ML/MIN/1.73 M^2
GLUCOSE SERPL-MCNC: 93 MG/DL (ref 70–110)
HCT VFR BLD AUTO: 42.9 % (ref 37–48.5)
HGB BLD-MCNC: 13.6 G/DL (ref 12–16)
IMM GRANULOCYTES # BLD AUTO: 0.02 K/UL (ref 0–0.04)
IMM GRANULOCYTES NFR BLD AUTO: 0.2 % (ref 0–0.5)
LYMPHOCYTES # BLD AUTO: 2.4 K/UL (ref 1–4.8)
LYMPHOCYTES NFR BLD: 27.3 % (ref 18–48)
MCH RBC QN AUTO: 31 PG (ref 27–31)
MCHC RBC AUTO-ENTMCNC: 31.7 G/DL (ref 32–36)
MCV RBC AUTO: 98 FL (ref 82–98)
MONOCYTES # BLD AUTO: 0.6 K/UL (ref 0.3–1)
MONOCYTES NFR BLD: 6.9 % (ref 4–15)
NEUTROPHILS # BLD AUTO: 5.4 K/UL (ref 1.8–7.7)
NEUTROPHILS NFR BLD: 61.8 % (ref 38–73)
NRBC BLD-RTO: 0 /100 WBC
PLATELET # BLD AUTO: 353 K/UL (ref 150–450)
PMV BLD AUTO: 10.8 FL (ref 9.2–12.9)
POTASSIUM SERPL-SCNC: 4.2 MMOL/L (ref 3.5–5.1)
PROT SERPL-MCNC: 6.3 G/DL (ref 6–8.4)
RBC # BLD AUTO: 4.39 M/UL (ref 4–5.4)
SODIUM SERPL-SCNC: 143 MMOL/L (ref 136–145)
WBC # BLD AUTO: 8.72 K/UL (ref 3.9–12.7)

## 2021-11-22 PROCEDURE — 86140 C-REACTIVE PROTEIN: CPT | Performed by: INTERNAL MEDICINE

## 2021-11-22 PROCEDURE — 85652 RBC SED RATE AUTOMATED: CPT | Performed by: INTERNAL MEDICINE

## 2021-11-22 PROCEDURE — 36415 COLL VENOUS BLD VENIPUNCTURE: CPT | Mod: PO | Performed by: INTERNAL MEDICINE

## 2021-11-22 PROCEDURE — 80053 COMPREHEN METABOLIC PANEL: CPT | Performed by: INTERNAL MEDICINE

## 2021-11-22 PROCEDURE — 85025 COMPLETE CBC W/AUTO DIFF WBC: CPT | Performed by: INTERNAL MEDICINE

## 2021-11-26 ENCOUNTER — SPECIALTY PHARMACY (OUTPATIENT)
Dept: PHARMACY | Facility: CLINIC | Age: 61
End: 2021-11-26
Payer: COMMERCIAL

## 2021-12-20 ENCOUNTER — LAB VISIT (OUTPATIENT)
Dept: LAB | Facility: HOSPITAL | Age: 61
End: 2021-12-20
Attending: INTERNAL MEDICINE
Payer: COMMERCIAL

## 2021-12-20 DIAGNOSIS — M05.79 RHEUMATOID ARTHRITIS INVOLVING MULTIPLE SITES WITH POSITIVE RHEUMATOID FACTOR: ICD-10-CM

## 2021-12-20 DIAGNOSIS — Z79.60 LONG-TERM USE OF IMMUNOSUPPRESSANT MEDICATION: ICD-10-CM

## 2021-12-20 LAB
ALBUMIN SERPL BCP-MCNC: 3.5 G/DL (ref 3.5–5.2)
ALP SERPL-CCNC: 116 U/L (ref 55–135)
ALT SERPL W/O P-5'-P-CCNC: 15 U/L (ref 10–44)
ANION GAP SERPL CALC-SCNC: 4 MMOL/L (ref 8–16)
AST SERPL-CCNC: 11 U/L (ref 10–40)
BASOPHILS # BLD AUTO: 0.06 K/UL (ref 0–0.2)
BASOPHILS NFR BLD: 0.7 % (ref 0–1.9)
BILIRUB SERPL-MCNC: 0.2 MG/DL (ref 0.1–1)
BUN SERPL-MCNC: 11 MG/DL (ref 6–20)
CALCIUM SERPL-MCNC: 9.1 MG/DL (ref 8.7–10.5)
CHLORIDE SERPL-SCNC: 107 MMOL/L (ref 95–110)
CO2 SERPL-SCNC: 30 MMOL/L (ref 23–29)
CREAT SERPL-MCNC: 0.7 MG/DL (ref 0.5–1.4)
CRP SERPL-MCNC: 6.3 MG/L (ref 0–8.2)
DIFFERENTIAL METHOD: ABNORMAL
EOSINOPHIL # BLD AUTO: 0.3 K/UL (ref 0–0.5)
EOSINOPHIL NFR BLD: 3.2 % (ref 0–8)
ERYTHROCYTE [DISTWIDTH] IN BLOOD BY AUTOMATED COUNT: 14 % (ref 11.5–14.5)
ERYTHROCYTE [SEDIMENTATION RATE] IN BLOOD BY WESTERGREN METHOD: 19 MM/HR (ref 0–36)
EST. GFR  (AFRICAN AMERICAN): >60 ML/MIN/1.73 M^2
EST. GFR  (NON AFRICAN AMERICAN): >60 ML/MIN/1.73 M^2
GLUCOSE SERPL-MCNC: 94 MG/DL (ref 70–110)
HCT VFR BLD AUTO: 43 % (ref 37–48.5)
HGB BLD-MCNC: 13.1 G/DL (ref 12–16)
IMM GRANULOCYTES # BLD AUTO: 0.02 K/UL (ref 0–0.04)
IMM GRANULOCYTES NFR BLD AUTO: 0.2 % (ref 0–0.5)
LYMPHOCYTES # BLD AUTO: 2.1 K/UL (ref 1–4.8)
LYMPHOCYTES NFR BLD: 25.5 % (ref 18–48)
MCH RBC QN AUTO: 30.7 PG (ref 27–31)
MCHC RBC AUTO-ENTMCNC: 30.5 G/DL (ref 32–36)
MCV RBC AUTO: 101 FL (ref 82–98)
MONOCYTES # BLD AUTO: 0.6 K/UL (ref 0.3–1)
MONOCYTES NFR BLD: 7.4 % (ref 4–15)
NEUTROPHILS # BLD AUTO: 5.2 K/UL (ref 1.8–7.7)
NEUTROPHILS NFR BLD: 63 % (ref 38–73)
NRBC BLD-RTO: 0 /100 WBC
PLATELET # BLD AUTO: 339 K/UL (ref 150–450)
PMV BLD AUTO: 10.4 FL (ref 9.2–12.9)
POTASSIUM SERPL-SCNC: 3.9 MMOL/L (ref 3.5–5.1)
PROT SERPL-MCNC: 6.7 G/DL (ref 6–8.4)
RBC # BLD AUTO: 4.27 M/UL (ref 4–5.4)
SODIUM SERPL-SCNC: 141 MMOL/L (ref 136–145)
WBC # BLD AUTO: 8.24 K/UL (ref 3.9–12.7)

## 2021-12-20 PROCEDURE — 36415 COLL VENOUS BLD VENIPUNCTURE: CPT | Mod: PO | Performed by: INTERNAL MEDICINE

## 2021-12-20 PROCEDURE — 85025 COMPLETE CBC W/AUTO DIFF WBC: CPT | Performed by: INTERNAL MEDICINE

## 2021-12-20 PROCEDURE — 86140 C-REACTIVE PROTEIN: CPT | Performed by: INTERNAL MEDICINE

## 2021-12-20 PROCEDURE — 80053 COMPREHEN METABOLIC PANEL: CPT | Performed by: INTERNAL MEDICINE

## 2021-12-20 PROCEDURE — 85652 RBC SED RATE AUTOMATED: CPT | Performed by: INTERNAL MEDICINE

## 2021-12-27 ENCOUNTER — SPECIALTY PHARMACY (OUTPATIENT)
Dept: PHARMACY | Facility: CLINIC | Age: 61
End: 2021-12-27
Payer: COMMERCIAL

## 2021-12-27 DIAGNOSIS — M05.79 RHEUMATOID ARTHRITIS INVOLVING MULTIPLE SITES WITH POSITIVE RHEUMATOID FACTOR: Primary | ICD-10-CM

## 2022-01-25 ENCOUNTER — SPECIALTY PHARMACY (OUTPATIENT)
Dept: PHARMACY | Facility: CLINIC | Age: 62
End: 2022-01-25
Payer: COMMERCIAL

## 2022-01-25 NOTE — TELEPHONE ENCOUNTER
Specialty Pharmacy - Refill Coordination    Specialty Medication Orders Linked to Encounter    Flowsheet Row Most Recent Value   Medication #1 methotrexate, PF, (OTREXUP, PF,) 25 mg/0.4 mL AtIn (Order#[id number], Rx#[id number])          Refill Questions - Documented Responses    Flowsheet Row Most Recent Value   Patient Availability and HIPAA Verification    Does patient want to proceed with activity? Yes   HIPAA/medical authority confirmed? Yes   Relationship to patient of person spoken to? Self   Refill Screening Questions    Changes to allergies? No   Changes to medications? No   New conditions since last clinic visit? No   Unplanned office visit, urgent care, ED, or hospital admission in the last 4 weeks? No   How does patient/caregiver feel medication is working? Good   Financial problems or insurance changes? No   How many doses of your specialty medications were missed in the last 4 weeks? 0   Would patient like to speak to a pharmacist? No   When does the patient need to receive the medication? 02/03/22   Refill Delivery Questions    How will the patient receive the medication? Delivery Tomeka   When does the patient need to receive the medication? 02/03/22   Shipping Address Home   Address in [facility] confirmed and updated if neccessary? Yes   Expected Copay ($) 0   Is the patient able to afford the medication copay? Yes   Payment Method zero copay   Days supply of Refill 28   Supplies needed? No supplies needed   Refill activity completed? Yes   Refill activity plan Refill scheduled   Shipment/Pickup Date: 02/02/22          Current Outpatient Medications   Medication Sig    albuterol (PROAIR HFA) 90 mcg/actuation inhaler Inhale 2 puffs into the lungs every 4 (four) hours as needed. 2 HFA Aerosol Inhaler Inhalation Every 4-6 hours    alendronate (FOSAMAX) 70 MG tablet Take 70 mg by mouth every 7 days.    cholecalciferol, vitamin D3, (VITAMIN D3) 25 mcg (1,000 unit) capsule Take 1 capsule (1,000  Units total) by mouth once daily.    diclofenac sodium (VOLTAREN) 1 % Gel Apply 2 g topically 3 (three) times daily.    ergocalciferol (ERGOCALCIFEROL) 50,000 unit Cap TK 1 C PO Q 7 DAYS    folic acid (FOLVITE) 1 MG tablet TAKE 1 TABLET(1 MG) BY MOUTH EVERY DAY    levothyroxine (SYNTHROID) 100 MCG tablet Take 1 tablet (100 mcg total) by mouth once daily. Fill brand name    methotrexate, PF, (OTREXUP, PF,) 25 mg/0.4 mL AtIn Inject 0.4 mLs (25 mg total) into the skin every 7 days.    multivit-min/ferrous gluconate (MULTI-DONNIE ORAL) Take by mouth.    mupirocin (BACTROBAN) 2 % ointment Apply to affected area 3 times daily    sodium,potassium,mag sulfates (SUPREP BOWEL PREP KIT) 17.5-3.13-1.6 gram SolR As Directed    tiZANidine (ZANAFLEX) 2 MG tablet Take 1 tablet (2 mg total) by mouth every 8 (eight) hours as needed (muscle spasm).    traMADoL (ULTRAM) 50 mg tablet Take 1 tablet (50 mg total) by mouth every 6 (six) hours as needed for Pain (breakthrough pain).    valACYclovir (VALTREX) 1000 MG tablet Take 1 tablet (1,000 mg total) by mouth 3 (three) times daily. for 7 days   Last reviewed on 11/15/2021  1:29 PM by Amando Prajapati MD    Review of patient's allergies indicates:   Allergen Reactions    Hydromorphone Shortness Of Breath    Percocet  [oxycodone-acetaminophen] Itching and Nausea Only    Vicodin  [hydrocodone-acetaminophen] Nausea Only and Nausea And Vomiting    Last reviewed on  11/15/2021 1:29 PM by Amando Prajapati      Tasks added this encounter   2/24/2022 - Refill Call (Auto Added)   Tasks due within next 3 months   No tasks due.     Paula Hughes [facility] - Specialty Pharm[facility]  [address] [place] [address]  Phone: [phone]  Fax: [phone]

## 2022-02-14 ENCOUNTER — PATIENT MESSAGE (OUTPATIENT)
Dept: RHEUMATOLOGY | Facility: CLINIC | Age: 62
End: 2022-02-14
Payer: COMMERCIAL

## 2022-02-18 DIAGNOSIS — M05.79 RHEUMATOID ARTHRITIS INVOLVING MULTIPLE SITES WITH POSITIVE RHEUMATOID FACTOR: ICD-10-CM

## 2022-02-19 RX ORDER — FOLIC ACID 1 MG/1
TABLET ORAL
Qty: 30 TABLET | Refills: 11 | Status: SHIPPED | OUTPATIENT
Start: 2022-02-19

## 2022-02-23 DIAGNOSIS — D84.9 IMMUNOSUPPRESSED STATUS: ICD-10-CM

## 2022-02-24 DIAGNOSIS — M05.79 RHEUMATOID ARTHRITIS INVOLVING MULTIPLE SITES WITH POSITIVE RHEUMATOID FACTOR: ICD-10-CM

## 2022-02-24 RX ORDER — METHOTREXATE 25 MG/.4ML
25 INJECTION, SOLUTION SUBCUTANEOUS
Qty: 1.6 ML | Refills: 11 | Status: CANCELLED | OUTPATIENT
Start: 2022-02-24 | End: 2022-03-26

## 2022-03-04 ENCOUNTER — SPECIALTY PHARMACY (OUTPATIENT)
Dept: PHARMACY | Facility: CLINIC | Age: 62
End: 2022-03-04
Payer: COMMERCIAL

## 2022-03-04 DIAGNOSIS — M05.79 RHEUMATOID ARTHRITIS INVOLVING MULTIPLE SITES WITH POSITIVE RHEUMATOID FACTOR: ICD-10-CM

## 2022-03-07 RX ORDER — METHOTREXATE 25 MG/.4ML
25 INJECTION, SOLUTION SUBCUTANEOUS
Qty: 1.6 ML | Refills: 1 | Status: SHIPPED | OUTPATIENT
Start: 2022-03-07 | End: 2022-05-02 | Stop reason: SDUPTHER

## 2022-03-08 NOTE — TELEPHONE ENCOUNTER
Otrexup refill received.  Copay $80.  [facility] $3500. [facility] in network.  Pt has copay card on file for $0 copay. Clinically reviewed.

## 2022-03-08 NOTE — TELEPHONE ENCOUNTER
Specialty Pharmacy - Refill Coordination  Specialty Pharmacy - Medication/Referral Authorization    Specialty Medication Orders Linked to Encounter    Flowsheet Row Most Recent Value   Medication #1 methotrexate, PF, (OTREXUP, PF,) 25 mg/0.4 mL AtIn (Order#[id number], Rx#[id number])          Refill Questions - Documented Responses    Flowsheet Row Most Recent Value   Patient Availability and HIPAA Verification    Does patient want to proceed with activity? Yes   HIPAA/medical authority confirmed? Yes   Relationship to patient of person spoken to? Self   Refill Screening Questions    Changes to allergies? No   Changes to medications? No   New conditions since last clinic visit? No   Unplanned office visit, urgent care, ED, or hospital admission in the last 4 weeks? No   How does patient/caregiver feel medication is working? Very good   Financial problems or insurance changes? No   How many doses of your specialty medications were missed in the last 4 weeks? 0   Would patient like to speak to a pharmacist? No   When does the patient need to receive the medication? 03/11/22   Refill Delivery Questions    How will the patient receive the medication? Delivery Tomeka   When does the patient need to receive the medication? 03/11/22   Shipping Address Home   Address in [facility] confirmed and updated if neccessary? Yes   Expected Copay ($) 0   Is the patient able to afford the medication copay? Yes   Payment Method zero copay   Days supply of Refill 28   Supplies needed? No supplies needed   Refill activity completed? Yes   Refill activity plan Refill scheduled   Shipment/Pickup Date: 03/10/22          Current Outpatient Medications   Medication Sig    albuterol (PROAIR HFA) 90 mcg/actuation inhaler Inhale 2 puffs into the lungs every 4 (four) hours as needed. 2 HFA Aerosol Inhaler Inhalation Every 4-6 hours    alendronate (FOSAMAX) 70 MG tablet Take 70 mg by mouth every 7 days.    cholecalciferol, vitamin D3,  (VITAMIN D3) 25 mcg (1,000 unit) capsule Take 1 capsule (1,000 Units total) by mouth once daily.    diclofenac sodium (VOLTAREN) 1 % Gel Apply 2 g topically 3 (three) times daily.    ergocalciferol (ERGOCALCIFEROL) 50,000 unit Cap TK 1 C PO Q 7 DAYS    folic acid (FOLVITE) 1 MG tablet TAKE 1 TABLET(1 MG) BY MOUTH EVERY DAY    levothyroxine (SYNTHROID) 100 MCG tablet Take 1 tablet (100 mcg total) by mouth once daily. Fill brand name    methotrexate, PF, (OTREXUP, PF,) 25 mg/0.4 mL AtIn Inject 0.4 mLs (25 mg total) into the skin every 7 days.    multivit-min/ferrous gluconate (MULTI-DONNIE ORAL) Take by mouth.    mupirocin (BACTROBAN) 2 % ointment Apply to affected area 3 times daily    sodium,potassium,mag sulfates (SUPREP BOWEL PREP KIT) 17.5-3.13-1.6 gram SolR As Directed    tiZANidine (ZANAFLEX) 2 MG tablet Take 1 tablet (2 mg total) by mouth every 8 (eight) hours as needed (muscle spasm).    traMADoL (ULTRAM) 50 mg tablet Take 1 tablet (50 mg total) by mouth every 6 (six) hours as needed for Pain (breakthrough pain).    valACYclovir (VALTREX) 1000 MG tablet Take 1 tablet (1,000 mg total) by mouth 3 (three) times daily. for 7 days   Last reviewed on 11/15/2021  1:29 PM by Amando Prajapati MD    Review of patient's allergies indicates:   Allergen Reactions    Hydromorphone Shortness Of Breath    Percocet  [oxycodone-acetaminophen] Itching and Nausea Only    Vicodin  [hydrocodone-acetaminophen] Nausea Only and Nausea And Vomiting    Last reviewed on  11/15/2021 1:29 PM by Amando Prajapati      Tasks added this encounter   4/1/2022 - Refill Call (Auto Added)   Tasks due within next 3 months   No tasks due.     Candice Miles, PharmD  [facility] - Specialty Pharmacy  [address] [place] [id number]  Phone: [phone]  Fax: [phone]

## 2022-04-02 ENCOUNTER — SPECIALTY PHARMACY (OUTPATIENT)
Dept: PHARMACY | Facility: CLINIC | Age: 62
End: 2022-04-02
Payer: COMMERCIAL

## 2022-04-03 NOTE — TELEPHONE ENCOUNTER
Otrexup PA submitted via [facility]- Key: [id number] - PA Case ID: [id number]    CaseId:[id number];Status:Approved;Review Type:Prior Auth;Coverage Start Date:03/04/2022;Coverage End Date:04/03/2023;    2022 BI has already been completed.

## 2022-04-08 NOTE — TELEPHONE ENCOUNTER
Specialty Pharmacy - Refill Coordination  Specialty Pharmacy - Medication/Referral Authorization    Specialty Medication Orders Linked to Encounter    Flowsheet Row Most Recent Value   Medication #1 methotrexate, PF, (OTREXUP, PF,) 25 mg/0.4 mL AtIn (Order#[id number], Rx#)          Refill Questions - Documented Responses    Flowsheet Row Most Recent Value   Patient Availability and HIPAA Verification    Does patient want to proceed with activity? Yes   HIPAA/medical authority confirmed? Yes   Relationship to patient of person spoken to? Self   Refill Screening Questions    Changes to allergies? No   Changes to medications? No   New conditions since last clinic visit? No   Unplanned office visit, urgent care, ED, or hospital admission in the last 4 weeks? No   How does patient/caregiver feel medication is working? Good   Financial problems or insurance changes? No   How many doses of your specialty medications were missed in the last 4 weeks? 0   Would patient like to speak to a pharmacist? No   When does the patient need to receive the medication? 04/11/22   Refill Delivery Questions    How will the patient receive the medication? Delivery Tomeka   When does the patient need to receive the medication? 04/11/22   Shipping Address Home   Address in [facility] confirmed and updated if neccessary? Yes   Expected Copay ($) 0   Is the patient able to afford the medication copay? Yes   Payment Method zero copay   Days supply of Refill 28   Supplies needed? No supplies needed   Refill activity completed? Yes   Refill activity plan Refill scheduled   Shipment/Pickup Date: 04/12/22          Current Outpatient Medications   Medication Sig    albuterol (PROAIR HFA) 90 mcg/actuation inhaler Inhale 2 puffs into the lungs every 4 (four) hours as needed. 2 HFA Aerosol Inhaler Inhalation Every 4-6 hours    alendronate (FOSAMAX) 70 MG tablet Take 70 mg by mouth every 7 days.    cholecalciferol, vitamin D3, (VITAMIN D3) 25 mcg  (1,000 unit) capsule Take 1 capsule (1,000 Units total) by mouth once daily.    diclofenac sodium (VOLTAREN) 1 % Gel Apply 2 g topically 3 (three) times daily.    ergocalciferol (ERGOCALCIFEROL) 50,000 unit Cap TK 1 C PO Q 7 DAYS    folic acid (FOLVITE) 1 MG tablet TAKE 1 TABLET(1 MG) BY MOUTH EVERY DAY    levothyroxine (SYNTHROID) 100 MCG tablet Take 1 tablet (100 mcg total) by mouth once daily. Fill brand name    methotrexate, PF, (OTREXUP, PF,) 25 mg/0.4 mL AtIn Inject 0.4 mLs (25 mg total) into the skin every 7 days.    multivit-min/ferrous gluconate (MULTI-DONNIE ORAL) Take by mouth.    mupirocin (BACTROBAN) 2 % ointment Apply to affected area 3 times daily    sodium,potassium,mag sulfates (SUPREP BOWEL PREP KIT) 17.5-3.13-1.6 gram SolR As Directed    traMADoL (ULTRAM) 50 mg tablet Take 1 tablet (50 mg total) by mouth every 6 (six) hours as needed for Pain (breakthrough pain).    valACYclovir (VALTREX) 1000 MG tablet Take 1 tablet (1,000 mg total) by mouth 3 (three) times daily. for 7 days   Last reviewed on 11/15/2021  1:29 PM by Amando Prajapati MD    Review of patient's allergies indicates:   Allergen Reactions    Hydromorphone Shortness Of Breath    Percocet  [oxycodone-acetaminophen] Itching and Nausea Only    Vicodin  [hydrocodone-acetaminophen] Nausea Only and Nausea And Vomiting    Last reviewed on  11/15/2021 1:29 PM by Amando Prajapati      Tasks added this encounter   No tasks added.   Tasks due within next 3 months   4/1/2022 - Refill Call (Auto Added)     Santhosh Hughes [facility] - Specialty Pharmacy  [address] [place] [id number]  Phone: [phone]  Fax: [phone]

## 2022-04-18 ENCOUNTER — PATIENT MESSAGE (OUTPATIENT)
Dept: ADMINISTRATIVE | Facility: OTHER | Age: 62
End: 2022-04-18
Payer: COMMERCIAL

## 2022-04-20 ENCOUNTER — LAB VISIT (OUTPATIENT)
Dept: LAB | Facility: HOSPITAL | Age: 62
End: 2022-04-20
Attending: INTERNAL MEDICINE
Payer: COMMERCIAL

## 2022-04-20 ENCOUNTER — OFFICE VISIT (OUTPATIENT)
Dept: RHEUMATOLOGY | Facility: CLINIC | Age: 62
End: 2022-04-20
Payer: COMMERCIAL

## 2022-04-20 VITALS
WEIGHT: 157.63 LBS | SYSTOLIC BLOOD PRESSURE: 117 MMHG | TEMPERATURE: 98 F | HEIGHT: 62 IN | HEART RATE: 78 BPM | DIASTOLIC BLOOD PRESSURE: 84 MMHG | BODY MASS INDEX: 29.01 KG/M2

## 2022-04-20 DIAGNOSIS — Z79.60 LONG-TERM USE OF IMMUNOSUPPRESSANT MEDICATION: ICD-10-CM

## 2022-04-20 DIAGNOSIS — M05.79 RHEUMATOID ARTHRITIS INVOLVING MULTIPLE SITES WITH POSITIVE RHEUMATOID FACTOR: ICD-10-CM

## 2022-04-20 DIAGNOSIS — M05.79 RHEUMATOID ARTHRITIS INVOLVING MULTIPLE SITES WITH POSITIVE RHEUMATOID FACTOR: Primary | ICD-10-CM

## 2022-04-20 LAB
ALBUMIN SERPL BCP-MCNC: 3.7 G/DL (ref 3.5–5.2)
ALP SERPL-CCNC: 131 U/L (ref 55–135)
ALT SERPL W/O P-5'-P-CCNC: 12 U/L (ref 10–44)
ANION GAP SERPL CALC-SCNC: 9 MMOL/L (ref 8–16)
AST SERPL-CCNC: 10 U/L (ref 10–40)
BASOPHILS # BLD AUTO: 0.05 K/UL (ref 0–0.2)
BASOPHILS NFR BLD: 0.6 % (ref 0–1.9)
BILIRUB SERPL-MCNC: 0.4 MG/DL (ref 0.1–1)
BUN SERPL-MCNC: 8 MG/DL (ref 6–20)
CALCIUM SERPL-MCNC: 9.3 MG/DL (ref 8.7–10.5)
CHLORIDE SERPL-SCNC: 106 MMOL/L (ref 95–110)
CO2 SERPL-SCNC: 29 MMOL/L (ref 23–29)
CREAT SERPL-MCNC: 0.7 MG/DL (ref 0.5–1.4)
CRP SERPL-MCNC: 7.7 MG/L (ref 0–8.2)
DIFFERENTIAL METHOD: NORMAL
EOSINOPHIL # BLD AUTO: 0.2 K/UL (ref 0–0.5)
EOSINOPHIL NFR BLD: 2.1 % (ref 0–8)
ERYTHROCYTE [DISTWIDTH] IN BLOOD BY AUTOMATED COUNT: 13.3 % (ref 11.5–14.5)
ERYTHROCYTE [SEDIMENTATION RATE] IN BLOOD BY WESTERGREN METHOD: 8 MM/HR (ref 0–36)
EST. GFR  (AFRICAN AMERICAN): >60 ML/MIN/1.73 M^2
EST. GFR  (NON AFRICAN AMERICAN): >60 ML/MIN/1.73 M^2
GLUCOSE SERPL-MCNC: 91 MG/DL (ref 70–110)
HCT VFR BLD AUTO: 44 % (ref 37–48.5)
HGB BLD-MCNC: 14.1 G/DL (ref 12–16)
IMM GRANULOCYTES # BLD AUTO: 0.03 K/UL (ref 0–0.04)
IMM GRANULOCYTES NFR BLD AUTO: 0.3 % (ref 0–0.5)
LYMPHOCYTES # BLD AUTO: 2.3 K/UL (ref 1–4.8)
LYMPHOCYTES NFR BLD: 24.8 % (ref 18–48)
MCH RBC QN AUTO: 30.2 PG (ref 27–31)
MCHC RBC AUTO-ENTMCNC: 32 G/DL (ref 32–36)
MCV RBC AUTO: 94 FL (ref 82–98)
MONOCYTES # BLD AUTO: 0.8 K/UL (ref 0.3–1)
MONOCYTES NFR BLD: 9 % (ref 4–15)
NEUTROPHILS # BLD AUTO: 5.8 K/UL (ref 1.8–7.7)
NEUTROPHILS NFR BLD: 63.2 % (ref 38–73)
NRBC BLD-RTO: 0 /100 WBC
PLATELET # BLD AUTO: 294 K/UL (ref 150–450)
PMV BLD AUTO: 9.8 FL (ref 9.2–12.9)
POTASSIUM SERPL-SCNC: 3.8 MMOL/L (ref 3.5–5.1)
PROT SERPL-MCNC: 7.1 G/DL (ref 6–8.4)
RBC # BLD AUTO: 4.67 M/UL (ref 4–5.4)
SODIUM SERPL-SCNC: 144 MMOL/L (ref 136–145)
WBC # BLD AUTO: 9.09 K/UL (ref 3.9–12.7)

## 2022-04-20 PROCEDURE — 1160F PR REVIEW ALL MEDS BY PRESCRIBER/CLIN PHARMACIST DOCUMENTED: ICD-10-PCS | Mod: CPTII,S$GLB,, | Performed by: INTERNAL MEDICINE

## 2022-04-20 PROCEDURE — 99999 PR PBB SHADOW E&M-EST. PATIENT-LVL IV: CPT | Mod: PBBFAC,,, | Performed by: INTERNAL MEDICINE

## 2022-04-20 PROCEDURE — 3079F DIAST BP 80-89 MM HG: CPT | Mod: CPTII,S$GLB,, | Performed by: INTERNAL MEDICINE

## 2022-04-20 PROCEDURE — 36415 COLL VENOUS BLD VENIPUNCTURE: CPT | Performed by: INTERNAL MEDICINE

## 2022-04-20 PROCEDURE — 86140 C-REACTIVE PROTEIN: CPT | Performed by: INTERNAL MEDICINE

## 2022-04-20 PROCEDURE — 1159F PR MEDICATION LIST DOCUMENTED IN MEDICAL RECORD: ICD-10-PCS | Mod: CPTII,S$GLB,, | Performed by: INTERNAL MEDICINE

## 2022-04-20 PROCEDURE — 3079F PR MOST RECENT DIASTOLIC BLOOD PRESSURE 80-89 MM HG: ICD-10-PCS | Mod: CPTII,S$GLB,, | Performed by: INTERNAL MEDICINE

## 2022-04-20 PROCEDURE — 1160F RVW MEDS BY RX/DR IN RCRD: CPT | Mod: CPTII,S$GLB,, | Performed by: INTERNAL MEDICINE

## 2022-04-20 PROCEDURE — 80053 COMPREHEN METABOLIC PANEL: CPT | Performed by: INTERNAL MEDICINE

## 2022-04-20 PROCEDURE — 99999 PR PBB SHADOW E&M-EST. PATIENT-LVL IV: ICD-10-PCS | Mod: PBBFAC,,, | Performed by: INTERNAL MEDICINE

## 2022-04-20 PROCEDURE — 85652 RBC SED RATE AUTOMATED: CPT | Performed by: INTERNAL MEDICINE

## 2022-04-20 PROCEDURE — 99214 PR OFFICE/OUTPT VISIT, EST, LEVL IV, 30-39 MIN: ICD-10-PCS | Mod: S$GLB,,, | Performed by: INTERNAL MEDICINE

## 2022-04-20 PROCEDURE — 85025 COMPLETE CBC W/AUTO DIFF WBC: CPT | Performed by: INTERNAL MEDICINE

## 2022-04-20 PROCEDURE — 3008F BODY MASS INDEX DOCD: CPT | Mod: CPTII,S$GLB,, | Performed by: INTERNAL MEDICINE

## 2022-04-20 PROCEDURE — 99214 OFFICE O/P EST MOD 30 MIN: CPT | Mod: S$GLB,,, | Performed by: INTERNAL MEDICINE

## 2022-04-20 PROCEDURE — 1159F MED LIST DOCD IN RCRD: CPT | Mod: CPTII,S$GLB,, | Performed by: INTERNAL MEDICINE

## 2022-04-20 PROCEDURE — 3074F SYST BP LT 130 MM HG: CPT | Mod: CPTII,S$GLB,, | Performed by: INTERNAL MEDICINE

## 2022-04-20 PROCEDURE — 3074F PR MOST RECENT SYSTOLIC BLOOD PRESSURE < 130 MM HG: ICD-10-PCS | Mod: CPTII,S$GLB,, | Performed by: INTERNAL MEDICINE

## 2022-04-20 PROCEDURE — 3008F PR BODY MASS INDEX (BMI) DOCUMENTED: ICD-10-PCS | Mod: CPTII,S$GLB,, | Performed by: INTERNAL MEDICINE

## 2022-04-20 NOTE — PROGRESS NOTES
Answers for HPI/ROS submitted by the patient on 4/18/2022  fever: No  eye redness: No  mouth sores: No  headaches: No  shortness of breath: Yes  chest pain: No  trouble swallowing: No  diarrhea: No  constipation: Yes  unexpected weight change: No  genital sore: No  dysuria: No  During the last 3 days, have you had a skin rash?: No  Bruises or bleeds easily: No  cough: No

## 2022-04-23 NOTE — PROGRESS NOTES
History of present illness:  62-year-old female with chronic low back pain.  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  I diagnosed her as having rheumatoid arthritis.  I placed her on methotrexate 15 mg weekly.  She was followed by Dr. Knapp during my absence.  She was changed to injectable methotrexate and is on 25 mg weekly.  She was also placed on sulfasalazine but she did not tolerate it.  She has macular degeneration so she was not considered a candidate for Plaquenil.  She also had a short course of prednisone.    She was last seen 8 months ago.  Her arthritis has been stable.  She remains on methotrexate and is tolerating it.  She has had no joint swelling.  She has no morning stiffness.  She does complain of some fatigue.  She had an episode of shingles but this improved.  She has been sleeping okay.  She has been using Voltaren gel with some relief.  She takes occasional Tylenol or Advil.  She denies other recent medical problems.    Physical examination:  Musculoskeletal:  Full range of motion of all joints.  No soft tissue swelling, erythema, or increased warmth.  No tender areas to palpation.  No arthritic deformities.    Assessment:  Stable rheumatoid arthritis    Plans:  1. Continue medication as before  2. Laboratory studies obtained  3. Laboratory studies in 4 months  4. Follow-up in 8 months   Answers for HPI/ROS submitted by the patient on 4/18/2022  fever: No  eye redness: No  mouth sores: No  headaches: No  shortness of breath: Yes  chest pain: No  trouble swallowing: No  diarrhea: No  constipation: Yes  unexpected weight change: No  genital sore: No  dysuria: No  During the last 3 days, have you had a skin rash?: No  Bruises or bleeds easily: No  cough: No

## 2022-05-02 ENCOUNTER — SPECIALTY PHARMACY (OUTPATIENT)
Dept: PHARMACY | Facility: CLINIC | Age: 62
End: 2022-05-02
Payer: COMMERCIAL

## 2022-05-02 DIAGNOSIS — M05.79 RHEUMATOID ARTHRITIS INVOLVING MULTIPLE SITES WITH POSITIVE RHEUMATOID FACTOR: ICD-10-CM

## 2022-05-02 RX ORDER — METHOTREXATE 25 MG/.4ML
25 INJECTION, SOLUTION SUBCUTANEOUS
Qty: 1.6 ML | Refills: 4 | Status: SHIPPED | OUTPATIENT
Start: 2022-05-02 | End: 2022-10-03 | Stop reason: SDUPTHER

## 2022-05-10 NOTE — TELEPHONE ENCOUNTER
Specialty Pharmacy - Refill Coordination    Specialty Medication Orders Linked to Encounter    Flowsheet Row Most Recent Value   Medication #1 methotrexate, PF, (OTREXUP, PF,) 25 mg/0.4 mL AtIn (Order#[id number], Rx#[id number])          Refill Questions - Documented Responses    Flowsheet Row Most Recent Value   Patient Availability and HIPAA Verification    Does patient want to proceed with activity? Yes   HIPAA/medical authority confirmed? Yes   Relationship to patient of person spoken to? Self   Refill Screening Questions    Changes to allergies? No   Changes to medications? No   New conditions since last clinic visit? No   Unplanned office visit, urgent care, ED, or hospital admission in the last 4 weeks? No   How does patient/caregiver feel medication is working? Good   Financial problems or insurance changes? No   How many doses of your specialty medications were missed in the last 4 weeks? 0   Would patient like to speak to a pharmacist? No   When does the patient need to receive the medication? 05/20/22   Refill Delivery Questions    How will the patient receive the medication? Delivery Tomeka   When does the patient need to receive the medication? 05/20/22   Shipping Address Home   Address in [facility] confirmed and updated if neccessary? Yes   Expected Copay ($) 0   Is the patient able to afford the medication copay? Yes   Payment Method zero copay   Days supply of Refill 28   Supplies needed? No supplies needed   Refill activity completed? Yes   Refill activity plan Refill scheduled   Shipment/Pickup Date: 05/13/22          Current Outpatient Medications   Medication Sig    albuterol (PROAIR HFA) 90 mcg/actuation inhaler Inhale 2 puffs into the lungs every 4 (four) hours as needed. 2 HFA Aerosol Inhaler Inhalation Every 4-6 hours    alendronate (FOSAMAX) 70 MG tablet Take 70 mg by mouth every 7 days.    cholecalciferol, vitamin D3, (VITAMIN D3) 25 mcg (1,000 unit) capsule Take 1 capsule (1,000  Units total) by mouth once daily. (Patient not taking: Reported on 4/20/2022)    diclofenac sodium (VOLTAREN) 1 % Gel Apply 2 g topically 3 (three) times daily.    ergocalciferol (ERGOCALCIFEROL) 50,000 unit Cap TK 1 C PO Q 7 DAYS    folic acid (FOLVITE) 1 MG tablet TAKE 1 TABLET(1 MG) BY MOUTH EVERY DAY    levothyroxine (SYNTHROID) 100 MCG tablet Take 1 tablet (100 mcg total) by mouth once daily. Fill brand name    methotrexate, PF, (OTREXUP, PF,) 25 mg/0.4 mL AtIn Inject 0.4 mLs (25 mg total) into the skin every 7 days.    multivit-min/ferrous gluconate (MULTI-DONNIE ORAL) Take by mouth.    mupirocin (BACTROBAN) 2 % ointment Apply to affected area 3 times daily (Patient not taking: Reported on 4/20/2022)    sodium,potassium,mag sulfates (SUPREP BOWEL PREP KIT) 17.5-3.13-1.6 gram SolR As Directed (Patient not taking: Reported on 4/20/2022)    valACYclovir (VALTREX) 1000 MG tablet Take 1 tablet (1,000 mg total) by mouth 3 (three) times daily. for 7 days   Last reviewed on 4/20/2022  4:41 PM by Zain Hernandez MD    Review of patient's allergies indicates:   Allergen Reactions    Hydromorphone Shortness Of Breath    Percocet  [oxycodone-acetaminophen] Itching and Nausea Only    Vicodin  [hydrocodone-acetaminophen] Nausea Only and Nausea And Vomiting    Last reviewed on  4/20/2022 4:41 PM by Zain Hernandez      Tasks added this encounter   6/10/2022 - Refill Call (Auto Added)   Tasks due within next 3 months   No tasks due.     Jenn Arredondo, Patient Care Assistant  [facility]  [facility]  [facility] [address]  Phone: [phone]  Fax: [phone]

## 2022-05-14 ENCOUNTER — LAB VISIT (OUTPATIENT)
Dept: LAB | Facility: HOSPITAL | Age: 62
End: 2022-05-14
Attending: INTERNAL MEDICINE
Payer: COMMERCIAL

## 2022-05-14 DIAGNOSIS — Z00.00 HEALTHCARE MAINTENANCE: ICD-10-CM

## 2022-05-14 LAB
25(OH)D3+25(OH)D2 SERPL-MCNC: 18 NG/ML (ref 30–96)
ALBUMIN SERPL BCP-MCNC: 3.6 G/DL (ref 3.5–5.2)
ALP SERPL-CCNC: 134 U/L (ref 55–135)
ALT SERPL W/O P-5'-P-CCNC: 16 U/L (ref 10–44)
ANION GAP SERPL CALC-SCNC: 10 MMOL/L (ref 8–16)
AST SERPL-CCNC: 16 U/L (ref 10–40)
BASOPHILS # BLD AUTO: 0.06 K/UL (ref 0–0.2)
BASOPHILS NFR BLD: 0.9 % (ref 0–1.9)
BILIRUB SERPL-MCNC: 0.5 MG/DL (ref 0.1–1)
BUN SERPL-MCNC: 8 MG/DL (ref 6–20)
CALCIUM SERPL-MCNC: 8.8 MG/DL (ref 8.7–10.5)
CHLORIDE SERPL-SCNC: 109 MMOL/L (ref 95–110)
CHOLEST SERPL-MCNC: 169 MG/DL (ref 120–199)
CHOLEST/HDLC SERPL: 4.8 {RATIO} (ref 2–5)
CO2 SERPL-SCNC: 23 MMOL/L (ref 23–29)
CREAT SERPL-MCNC: 0.7 MG/DL (ref 0.5–1.4)
DIFFERENTIAL METHOD: ABNORMAL
EOSINOPHIL # BLD AUTO: 0.3 K/UL (ref 0–0.5)
EOSINOPHIL NFR BLD: 3.7 % (ref 0–8)
ERYTHROCYTE [DISTWIDTH] IN BLOOD BY AUTOMATED COUNT: 13.8 % (ref 11.5–14.5)
EST. GFR  (AFRICAN AMERICAN): >60 ML/MIN/1.73 M^2
EST. GFR  (NON AFRICAN AMERICAN): >60 ML/MIN/1.73 M^2
ESTIMATED AVG GLUCOSE: 108 MG/DL (ref 68–131)
GLUCOSE SERPL-MCNC: 83 MG/DL (ref 70–110)
HBA1C MFR BLD: 5.4 % (ref 4–5.6)
HCT VFR BLD AUTO: 43.4 % (ref 37–48.5)
HDLC SERPL-MCNC: 35 MG/DL (ref 40–75)
HDLC SERPL: 20.7 % (ref 20–50)
HGB BLD-MCNC: 13.6 G/DL (ref 12–16)
IMM GRANULOCYTES # BLD AUTO: 0.01 K/UL (ref 0–0.04)
IMM GRANULOCYTES NFR BLD AUTO: 0.1 % (ref 0–0.5)
LDLC SERPL CALC-MCNC: 118 MG/DL (ref 63–159)
LYMPHOCYTES # BLD AUTO: 2.2 K/UL (ref 1–4.8)
LYMPHOCYTES NFR BLD: 32.3 % (ref 18–48)
MCH RBC QN AUTO: 29.4 PG (ref 27–31)
MCHC RBC AUTO-ENTMCNC: 31.3 G/DL (ref 32–36)
MCV RBC AUTO: 94 FL (ref 82–98)
MONOCYTES # BLD AUTO: 0.6 K/UL (ref 0.3–1)
MONOCYTES NFR BLD: 9.3 % (ref 4–15)
NEUTROPHILS # BLD AUTO: 3.6 K/UL (ref 1.8–7.7)
NEUTROPHILS NFR BLD: 53.7 % (ref 38–73)
NONHDLC SERPL-MCNC: 134 MG/DL
NRBC BLD-RTO: 0 /100 WBC
PLATELET # BLD AUTO: 299 K/UL (ref 150–450)
PMV BLD AUTO: 10.2 FL (ref 9.2–12.9)
POTASSIUM SERPL-SCNC: 4.2 MMOL/L (ref 3.5–5.1)
PROT SERPL-MCNC: 6.9 G/DL (ref 6–8.4)
RBC # BLD AUTO: 4.62 M/UL (ref 4–5.4)
SODIUM SERPL-SCNC: 142 MMOL/L (ref 136–145)
TRIGL SERPL-MCNC: 80 MG/DL (ref 30–150)
TSH SERPL DL<=0.005 MIU/L-ACNC: 0.46 UIU/ML (ref 0.4–4)
WBC # BLD AUTO: 6.75 K/UL (ref 3.9–12.7)

## 2022-05-14 PROCEDURE — 84443 ASSAY THYROID STIM HORMONE: CPT | Performed by: INTERNAL MEDICINE

## 2022-05-14 PROCEDURE — 80061 LIPID PANEL: CPT | Performed by: INTERNAL MEDICINE

## 2022-05-14 PROCEDURE — 85025 COMPLETE CBC W/AUTO DIFF WBC: CPT | Performed by: INTERNAL MEDICINE

## 2022-05-14 PROCEDURE — 83036 HEMOGLOBIN GLYCOSYLATED A1C: CPT | Performed by: INTERNAL MEDICINE

## 2022-05-14 PROCEDURE — 82306 VITAMIN D 25 HYDROXY: CPT | Performed by: INTERNAL MEDICINE

## 2022-05-14 PROCEDURE — 80053 COMPREHEN METABOLIC PANEL: CPT | Performed by: INTERNAL MEDICINE

## 2022-05-14 PROCEDURE — 36415 COLL VENOUS BLD VENIPUNCTURE: CPT | Mod: PO | Performed by: INTERNAL MEDICINE

## 2022-05-16 ENCOUNTER — OFFICE VISIT (OUTPATIENT)
Dept: FAMILY MEDICINE | Facility: CLINIC | Age: 62
End: 2022-05-16
Payer: COMMERCIAL

## 2022-05-16 VITALS
SYSTOLIC BLOOD PRESSURE: 136 MMHG | HEART RATE: 67 BPM | RESPIRATION RATE: 16 BRPM | WEIGHT: 155.19 LBS | DIASTOLIC BLOOD PRESSURE: 64 MMHG | BODY MASS INDEX: 28.39 KG/M2 | OXYGEN SATURATION: 96 %

## 2022-05-16 DIAGNOSIS — M05.79 RHEUMATOID ARTHRITIS INVOLVING MULTIPLE SITES WITH POSITIVE RHEUMATOID FACTOR: ICD-10-CM

## 2022-05-16 DIAGNOSIS — Z79.60 LONG-TERM USE OF IMMUNOSUPPRESSANT MEDICATION: ICD-10-CM

## 2022-05-16 DIAGNOSIS — E55.9 VITAMIN D DEFICIENCY: ICD-10-CM

## 2022-05-16 DIAGNOSIS — Z00.00 ANNUAL PHYSICAL EXAM: Primary | ICD-10-CM

## 2022-05-16 DIAGNOSIS — M15.9 PRIMARY OSTEOARTHRITIS INVOLVING MULTIPLE JOINTS: ICD-10-CM

## 2022-05-16 DIAGNOSIS — E03.9 ACQUIRED HYPOTHYROIDISM: ICD-10-CM

## 2022-05-16 DIAGNOSIS — E78.5 HYPERLIPIDEMIA, UNSPECIFIED HYPERLIPIDEMIA TYPE: ICD-10-CM

## 2022-05-16 DIAGNOSIS — D84.9 IMMUNOSUPPRESSION: ICD-10-CM

## 2022-05-16 DIAGNOSIS — Z00.00 HEALTHCARE MAINTENANCE: ICD-10-CM

## 2022-05-16 DIAGNOSIS — E55.9 VITAMIN D DEFICIENCY DISEASE: ICD-10-CM

## 2022-05-16 PROCEDURE — 3078F DIAST BP <80 MM HG: CPT | Mod: CPTII,S$GLB,, | Performed by: INTERNAL MEDICINE

## 2022-05-16 PROCEDURE — 3044F HG A1C LEVEL LT 7.0%: CPT | Mod: CPTII,S$GLB,, | Performed by: INTERNAL MEDICINE

## 2022-05-16 PROCEDURE — 1160F PR REVIEW ALL MEDS BY PRESCRIBER/CLIN PHARMACIST DOCUMENTED: ICD-10-PCS | Mod: CPTII,S$GLB,, | Performed by: INTERNAL MEDICINE

## 2022-05-16 PROCEDURE — 99214 PR OFFICE/OUTPT VISIT, EST, LEVL IV, 30-39 MIN: ICD-10-PCS | Mod: S$GLB,,, | Performed by: INTERNAL MEDICINE

## 2022-05-16 PROCEDURE — 99999 PR PBB SHADOW E&M-EST. PATIENT-LVL V: ICD-10-PCS | Mod: PBBFAC,,, | Performed by: INTERNAL MEDICINE

## 2022-05-16 PROCEDURE — 3044F PR MOST RECENT HEMOGLOBIN A1C LEVEL <7.0%: ICD-10-PCS | Mod: CPTII,S$GLB,, | Performed by: INTERNAL MEDICINE

## 2022-05-16 PROCEDURE — 1159F MED LIST DOCD IN RCRD: CPT | Mod: CPTII,S$GLB,, | Performed by: INTERNAL MEDICINE

## 2022-05-16 PROCEDURE — 1159F PR MEDICATION LIST DOCUMENTED IN MEDICAL RECORD: ICD-10-PCS | Mod: CPTII,S$GLB,, | Performed by: INTERNAL MEDICINE

## 2022-05-16 PROCEDURE — 99214 OFFICE O/P EST MOD 30 MIN: CPT | Mod: S$GLB,,, | Performed by: INTERNAL MEDICINE

## 2022-05-16 PROCEDURE — 3075F PR MOST RECENT SYSTOLIC BLOOD PRESS GE 130-139MM HG: ICD-10-PCS | Mod: CPTII,S$GLB,, | Performed by: INTERNAL MEDICINE

## 2022-05-16 PROCEDURE — 1160F RVW MEDS BY RX/DR IN RCRD: CPT | Mod: CPTII,S$GLB,, | Performed by: INTERNAL MEDICINE

## 2022-05-16 PROCEDURE — 3078F PR MOST RECENT DIASTOLIC BLOOD PRESSURE < 80 MM HG: ICD-10-PCS | Mod: CPTII,S$GLB,, | Performed by: INTERNAL MEDICINE

## 2022-05-16 PROCEDURE — 3075F SYST BP GE 130 - 139MM HG: CPT | Mod: CPTII,S$GLB,, | Performed by: INTERNAL MEDICINE

## 2022-05-16 PROCEDURE — 3008F PR BODY MASS INDEX (BMI) DOCUMENTED: ICD-10-PCS | Mod: CPTII,S$GLB,, | Performed by: INTERNAL MEDICINE

## 2022-05-16 PROCEDURE — 3008F BODY MASS INDEX DOCD: CPT | Mod: CPTII,S$GLB,, | Performed by: INTERNAL MEDICINE

## 2022-05-16 PROCEDURE — 99999 PR PBB SHADOW E&M-EST. PATIENT-LVL V: CPT | Mod: PBBFAC,,, | Performed by: INTERNAL MEDICINE

## 2022-05-16 RX ORDER — CHOLECALCIFEROL (VITAMIN D3) 50 MCG
1 TABLET ORAL DAILY
Qty: 90 TABLET | Refills: 1 | Status: SHIPPED | OUTPATIENT
Start: 2022-05-16 | End: 2022-11-27 | Stop reason: SDUPTHER

## 2022-05-16 NOTE — PROGRESS NOTES
"HISTORY OF PRESENT ILLNESS:  Darlyn Donato is a 62 y.o. female who presents to the clinic today for Follow-up    Last seen by me 11/2021.    Osteopenia - On alendronate.     RA - Managed with MTX and folic acid.  Followed by Dr. Hernandez.  Notes occasional joint pain.    Notes some fatigue and feels she sleeps enough but not restful.  Notes some personal stress.    PAST MEDICAL HISTORY:  Past Medical History:   Diagnosis Date    Arthritis     Depression     Early dry stage nonexudative age-related macular degeneration of both eyes 8/19/2019    Elevated alkaline phosphatase level     Hyperlipidemia     Nuclear sclerosis of both eyes 8/19/2019    Osteopenia     Personal history of female infertility     Respiratory distress     " stopped Breathing" after 2 pain meds were given in a close time frame to one another.    Thyroid disease     multinodular goiter    Vitamin D deficiency disease        PAST SURGICAL HISTORY:  Past Surgical History:   Procedure Laterality Date    BREAST BIOPSY Left     over 10 yrs ago    BREAST SURGERY      benign biopsy on left    COLONOSCOPY N/A 6/7/2019    Procedure: COLONOSCOPY;  Surgeon: Julian Waldrop MD;  Location: [facility] ([address] [place] [address]);  Service: Endoscopy;  Laterality: N/A;  2nd floor - per anethesia note from Pt hysterectomy 2013, Pt possible difficult intubation - ERW    COLONOSCOPY N/A 11/1/2021    Procedure: COLONOSCOPY;  Surgeon: Brody Sanchez MD;  Location: [facility];  Service: Endoscopy;  Laterality: N/A;  pt completed COVID vaccine- see Immunization record in chart-rb  pt reports n/v with anesthesia  covid test 10/1 [place], prep instr portal -ml  8/10 LVM and portal of new arrival time-ml  9/14 r/s again due to 10/4 date no OutPts scheduling; Pt wants Early Mondays ONLY  covid elda    HYSTERECTOMY      LAPAROSCOPY W/ MINI-LAPAROTOMY      for fertility issues    OOPHORECTOMY      FL REMOVAL OF OVARY/TUBE(S)      RECTAL SURGERY      excess skin growth removal "    supracervical hysterectomy      TONSILLECTOMY         SOCIAL HISTORY:  Social History     Socioeconomic History    Marital status:     Number of children: 0    Years of education: some colle   Occupational History    Occupation:  law firm     Employer: [employer]   Tobacco Use    Smoking status: Former Smoker     Packs/day: 2.50     Years: 30.00     Pack years: 75.00    Smokeless tobacco: Never Used   Substance and Sexual Activity    Alcohol use: No     Comment: Rare    Drug use: No    Sexual activity: Not Currently     Partners: Male   Social History Narrative    Adult Screenings    Mammogram( for females) done 4/2011    Pap ( for females)? Needs referral to  GYN    Colonoscopy age  52-Not done yet    Flu shot yearly to be done today  1/9/13    Td ?    Pneumovax recommended one time  at age  67    Zostavax recommended one time at  age  62    Eye exam recommended yearly- not done yet     Bone density April 2011-osteopenia       FAMILY HISTORY:  Family History   Problem Relation Age of Onset    Breast cancer Mother     Hyperlipidemia Mother     Macular degeneration Mother     Emphysema Father     No Known Problems Sister     No Known Problems Brother     Macular degeneration Maternal Aunt     No Known Problems Maternal Uncle     No Known Problems Paternal Aunt     No Known Problems Paternal Uncle     No Known Problems Maternal Grandmother     No Known Problems Maternal Grandfather     No Known Problems Paternal Grandmother     No Known Problems Paternal Grandfather     Colon cancer Neg Hx     Ovarian cancer Neg Hx     Melanoma Neg Hx     Amblyopia Neg Hx     Blindness Neg Hx     Cancer Neg Hx     Cataracts Neg Hx     Diabetes Neg Hx     Hypertension Neg Hx     Retinal detachment Neg Hx     Strabismus Neg Hx     Stroke Neg Hx     Thyroid disease Neg Hx     Glaucoma Neg Hx        ALLERGIES AND MEDICATIONS: updated and reviewed.  Review of patient's  allergies indicates:   Allergen Reactions    Hydromorphone Shortness Of Breath    Percocet  [oxycodone-acetaminophen] Itching and Nausea Only    Vicodin  [hydrocodone-acetaminophen] Nausea Only and Nausea And Vomiting     Medication List with Changes/Refills   Current Medications    ALBUTEROL (PROAIR HFA) 90 MCG/ACTUATION INHALER    Inhale 2 puffs into the lungs every 4 (four) hours as needed. 2 HFA Aerosol Inhaler Inhalation Every 4-6 hours    ALENDRONATE (FOSAMAX) 70 MG TABLET    Take 70 mg by mouth every 7 days.    CHOLECALCIFEROL, VITAMIN D3, (VITAMIN D3) 25 MCG (1,000 UNIT) CAPSULE    Take 1 capsule (1,000 Units total) by mouth once daily.    DICLOFENAC SODIUM (VOLTAREN) 1 % GEL    Apply 2 g topically 3 (three) times daily.    ERGOCALCIFEROL (ERGOCALCIFEROL) 50,000 UNIT CAP    TK 1 C PO Q 7 DAYS    FOLIC ACID (FOLVITE) 1 MG TABLET    TAKE 1 TABLET(1 MG) BY MOUTH EVERY DAY    LEVOTHYROXINE (SYNTHROID) 100 MCG TABLET    Take 1 tablet (100 mcg total) by mouth once daily. Fill brand name    METHOTREXATE, PF, (OTREXUP, PF,) 25 MG/0.4 ML ATIN    Inject 0.4 mLs (25 mg total) into the skin every 7 days.    MULTIVIT-MIN/FERROUS GLUCONATE (MULTI-DONNIE ORAL)    Take by mouth.    MUPIROCIN (BACTROBAN) 2 % OINTMENT    Apply to affected area 3 times daily    SODIUM,POTASSIUM,MAG SULFATES (SUPREP BOWEL PREP KIT) 17.5-3.13-1.6 GRAM SOLR    As Directed    VALACYCLOVIR (VALTREX) 1000 MG TABLET    Take 1 tablet (1,000 mg total) by mouth 3 (three) times daily. for 7 days          CARE TEAM:  Patient Care Team:  Amando Prajapati MD as PCP - General (Internal Medicine)  Poncho Rasmussen MA as Care Coordinator         REVIEW OF SYSTEMS:  Review of Systems   Constitutional: Positive for fatigue. Negative for fever.   HENT: Negative for congestion and postnasal drip.    Eyes: Negative for photophobia and visual disturbance.   Respiratory: Negative for cough and shortness of breath.    Cardiovascular: Negative for chest pain.    Gastrointestinal: Negative for abdominal pain, nausea and vomiting.   Endocrine: Negative for cold intolerance and heat intolerance.   Genitourinary: Negative for dysuria and frequency.   Musculoskeletal: Positive for arthralgias and back pain.   Neurological: Negative for light-headedness and headaches.   Psychiatric/Behavioral: Negative for dysphoric mood. The patient is not nervous/anxious.          PHYSICAL EXAM:   Vitals:    05/16/22 0850   BP: 136/64   Pulse: 67   Resp: 16             Body mass index is 28.39 kg/m².     General appearance - alert, well appearing, and in no distress and oriented to person, place, and time  Mental status - normal mood, behavior, speech, dress, motor activity, and thought processes  Eyes - sclera anicteric, left eye normal, right eye normal  Ears - bilateral TM's and external ear canals normal  Chest - clear to auscultation, no wheezes, rales or rhonchi, symmetric air entry  Heart - normal rate, regular rhythm, normal S1, S2, no murmurs, rubs, clicks or gallops  Abdomen - soft, nontender, nondistended, no masses or organomegaly  bowel sounds normal  Neurological - alert, oriented, normal speech, no focal findings or movement disorder noted, motor and sensory grossly normal bilaterally  Musculoskeletal - no joint tenderness, deformity or swelling  Extremities - peripheral pulses normal, no pedal edema, no clubbing or cyanosis  Skin - normal coloration and turgor, no rashes, no suspicious skin lesions noted      ASSESSMENT AND PLAN:  Annual physical exam       - Encouraged for efforts with healthy lifestyle.    Vitamin D deficiency  Vitamin D deficiency disease  -     Vitamin D; Future; Expected date: 04/16/2023  -     cholecalciferol, vitamin D3, (VITAMIN D3) 50 mcg (2,000 unit) Tab; Take 1 tablet (2,000 Units total) by mouth once daily.  Dispense: 90 tablet; Refill: 1    Rheumatoid arthritis involving multiple sites with positive rheumatoid factor  Immunosuppression  Long-term  use of immunosuppressant medication       - Stable on current medication and followed by rheumatology.    Healthcare maintenance  -     Ambulatory referral/consult to Obstetrics / Gynecology; Future; Expected date: 05/23/2022  -     Hemoglobin A1C; Future; Expected date: 04/16/2023  -     Comprehensive Metabolic Panel; Future; Expected date: 04/16/2023  -     Lipid Panel; Future; Expected date: 04/16/2023  -     CBC Auto Differential; Future; Expected date: 04/16/2023  -     TSH; Future; Expected date: 04/16/2023  -     Vitamin D; Future; Expected date: 04/16/2023    Hyperlipidemia, unspecified hyperlipidemia type        - Improved with diet modification.    The 10-year ASCVD risk score (Juliansp FOFANA Jr., et al., 2013) is: 4.4%    Values used to calculate the score:      Age: 62 years      Sex: Female      Is Non- : No      Diabetic: No      Tobacco smoker: No      Systolic Blood Pressure: 136 mmHg      Is BP treated: No      HDL Cholesterol: 35 mg/dL      Total Cholesterol: 169 mg/dL    Acquired hypothyroidism  -     TSH; Future; Expected date: 04/16/2023    Primary osteoarthritis involving multiple joints             Follow up one year or sooner as needed.

## 2022-05-17 ENCOUNTER — SPECIALTY PHARMACY (OUTPATIENT)
Dept: PHARMACY | Facility: CLINIC | Age: 62
End: 2022-05-17
Payer: COMMERCIAL

## 2022-05-17 RX ORDER — ALENDRONATE SODIUM 70 MG/1
70 TABLET ORAL
COMMUNITY
End: 2022-05-17 | Stop reason: SDUPTHER

## 2022-05-17 RX ORDER — ALENDRONATE SODIUM 70 MG/1
70 TABLET ORAL
Qty: 4 TABLET | Refills: 11 | Status: SHIPPED | OUTPATIENT
Start: 2022-05-17 | End: 2023-05-16 | Stop reason: SDUPTHER

## 2022-05-17 NOTE — TELEPHONE ENCOUNTER
Incoming call from patient requesting a new alendronate prescription. Informed pt that this was the specialty pharmacy not the doctors office. Pt stated that [facility] number was the phone number listed in [email or web address] for [facility]. Provided her with office number. Pt voiced understanding.

## 2022-06-08 ENCOUNTER — SPECIALTY PHARMACY (OUTPATIENT)
Dept: PHARMACY | Facility: CLINIC | Age: 62
End: 2022-06-08
Payer: COMMERCIAL

## 2022-06-08 DIAGNOSIS — M05.79 RHEUMATOID ARTHRITIS INVOLVING MULTIPLE SITES WITH POSITIVE RHEUMATOID FACTOR: Primary | ICD-10-CM

## 2022-06-08 NOTE — TELEPHONE ENCOUNTER
Specialty Pharmacy - Clinical Reassessment    Specialty Medication Orders Linked to Encounter    Flowsheet Row Most Recent Value   Medication #1 methotrexate, PF, (OTREXUP, PF,) 25 mg/0.4 mL AtIn (Order#[id number], Rx#[id number])        Patient Diagnosis   M05.79 - Rheumatoid arthritis involving multiple sites with positive rheumatoid factor    Specialty clinical pharmacist review completed for an annual review of reassessment. Reviewed the following areas: current med list, reports of adverse effects, adherence and progress towards therapeutic goals.    Recommendations: none at this time.    Tasks added this encounter   No tasks added.   Tasks due within next 3 months   6/10/2022 - Refill Call (Auto Added)     Eliza Moore, SidD  [facility]  [address] [facility] [address]  Phone: [phone]  Fax: [phone]

## 2022-06-10 ENCOUNTER — SPECIALTY PHARMACY (OUTPATIENT)
Dept: PHARMACY | Facility: CLINIC | Age: 62
End: 2022-06-10
Payer: COMMERCIAL

## 2022-06-10 NOTE — TELEPHONE ENCOUNTER
Specialty Pharmacy - Refill Coordination    Specialty Medication Orders Linked to Encounter    Flowsheet Row Most Recent Value   Medication #1 methotrexate, PF, (OTREXUP, PF,) 25 mg/0.4 mL AtIn (Order#[id number], Rx#[id number])          Refill Questions - Documented Responses    Flowsheet Row Most Recent Value   Patient Availability and HIPAA Verification    Does patient want to proceed with activity? Yes   HIPAA/medical authority confirmed? Yes   Relationship to patient of person spoken to? Self   Refill Screening Questions    Changes to allergies? No   Changes to medications? No   New conditions since last clinic visit? No   Unplanned office visit, urgent care, ED, or hospital admission in the last 4 weeks? No   How does patient/caregiver feel medication is working? Good   Financial problems or insurance changes? No   How many doses of your specialty medications were missed in the last 4 weeks? 0   Would patient like to speak to a pharmacist? No   When does the patient need to receive the medication? 06/19/22   Refill Delivery Questions    How will the patient receive the medication? Delivery [facility]   When does the patient need to receive the medication? 06/19/22   Shipping Address Home   Address in [facility] confirmed and updated if neccessary? Yes   Expected Copay ($) 0   Is the patient able to afford the medication copay? Yes   Payment Method zero copay   Days supply of Refill 28   Supplies needed? No supplies needed   Refill activity completed? Yes   Refill activity plan Refill scheduled   Shipment/Pickup Date: 06/16/22          Current Outpatient Medications   Medication Sig    albuterol (PROAIR HFA) 90 mcg/actuation inhaler Inhale 2 puffs into the lungs every 4 (four) hours as needed. 2 HFA Aerosol Inhaler Inhalation Every 4-6 hours    alendronate (FOSAMAX) 70 MG tablet Take 1 tablet (70 mg total) by mouth every 7 days.    cholecalciferol, vitamin D3, (VITAMIN D3) 50 mcg (2,000 unit) Tab Take 1  tablet (2,000 Units total) by mouth once daily.    diclofenac sodium (VOLTAREN) 1 % Gel Apply 2 g topically 3 (three) times daily.    folic acid (FOLVITE) 1 MG tablet TAKE 1 TABLET(1 MG) BY MOUTH EVERY DAY    levothyroxine (SYNTHROID) 100 MCG tablet Take 1 tablet (100 mcg total) by mouth once daily. Fill brand name    methotrexate, PF, (OTREXUP, PF,) 25 mg/0.4 mL AtIn Inject 0.4 mLs (25 mg total) into the skin every 7 days.    multivit-min/ferrous gluconate (MULTI-DONNIE ORAL) Take by mouth.    mupirocin (BACTROBAN) 2 % ointment Apply to affected area 3 times daily (Patient not taking: Reported on 4/20/2022)   Last reviewed on 5/16/2022  9:13 AM by Amando Prajapati MD    Review of patient's allergies indicates:   Allergen Reactions    Hydromorphone Shortness Of Breath    Percocet  [oxycodone-acetaminophen] Itching and Nausea Only    Vicodin  [hydrocodone-acetaminophen] Nausea Only and Nausea And Vomiting    Last reviewed on  5/16/2022 9:13 AM by Amando Prajapati      Tasks added this encounter   No tasks added.   Tasks due within next 3 months   6/10/2022 - Refill Call (Auto Added)     [facility]  [address] [facility] [address]  Phone: [phone]  Fax: [phone]

## 2022-06-22 ENCOUNTER — OFFICE VISIT (OUTPATIENT)
Dept: OBSTETRICS AND GYNECOLOGY | Facility: CLINIC | Age: 62
End: 2022-06-22
Payer: COMMERCIAL

## 2022-06-22 VITALS
DIASTOLIC BLOOD PRESSURE: 66 MMHG | HEIGHT: 62 IN | SYSTOLIC BLOOD PRESSURE: 112 MMHG | BODY MASS INDEX: 28.32 KG/M2 | WEIGHT: 153.88 LBS

## 2022-06-22 DIAGNOSIS — Z12.4 ENCOUNTER FOR PAPANICOLAOU SMEAR FOR CERVICAL CANCER SCREENING: ICD-10-CM

## 2022-06-22 DIAGNOSIS — Z00.00 HEALTHCARE MAINTENANCE: ICD-10-CM

## 2022-06-22 DIAGNOSIS — Z12.31 BREAST CANCER SCREENING BY MAMMOGRAM: ICD-10-CM

## 2022-06-22 DIAGNOSIS — Z01.419 WELL WOMAN EXAM WITH ROUTINE GYNECOLOGICAL EXAM: Primary | ICD-10-CM

## 2022-06-22 PROCEDURE — 3078F DIAST BP <80 MM HG: CPT | Mod: CPTII,S$GLB,, | Performed by: OBSTETRICS & GYNECOLOGY

## 2022-06-22 PROCEDURE — 3008F PR BODY MASS INDEX (BMI) DOCUMENTED: ICD-10-PCS | Mod: CPTII,S$GLB,, | Performed by: OBSTETRICS & GYNECOLOGY

## 2022-06-22 PROCEDURE — 3008F BODY MASS INDEX DOCD: CPT | Mod: CPTII,S$GLB,, | Performed by: OBSTETRICS & GYNECOLOGY

## 2022-06-22 PROCEDURE — 99999 PR PBB SHADOW E&M-EST. PATIENT-LVL III: ICD-10-PCS | Mod: PBBFAC,,, | Performed by: OBSTETRICS & GYNECOLOGY

## 2022-06-22 PROCEDURE — 88175 CYTOPATH C/V AUTO FLUID REDO: CPT | Performed by: OBSTETRICS & GYNECOLOGY

## 2022-06-22 PROCEDURE — 1159F MED LIST DOCD IN RCRD: CPT | Mod: CPTII,S$GLB,, | Performed by: OBSTETRICS & GYNECOLOGY

## 2022-06-22 PROCEDURE — 3044F PR MOST RECENT HEMOGLOBIN A1C LEVEL <7.0%: ICD-10-PCS | Mod: CPTII,S$GLB,, | Performed by: OBSTETRICS & GYNECOLOGY

## 2022-06-22 PROCEDURE — 3078F PR MOST RECENT DIASTOLIC BLOOD PRESSURE < 80 MM HG: ICD-10-PCS | Mod: CPTII,S$GLB,, | Performed by: OBSTETRICS & GYNECOLOGY

## 2022-06-22 PROCEDURE — 3074F PR MOST RECENT SYSTOLIC BLOOD PRESSURE < 130 MM HG: ICD-10-PCS | Mod: CPTII,S$GLB,, | Performed by: OBSTETRICS & GYNECOLOGY

## 2022-06-22 PROCEDURE — 87624 HPV HI-RISK TYP POOLED RSLT: CPT | Performed by: OBSTETRICS & GYNECOLOGY

## 2022-06-22 PROCEDURE — 99999 PR PBB SHADOW E&M-EST. PATIENT-LVL III: CPT | Mod: PBBFAC,,, | Performed by: OBSTETRICS & GYNECOLOGY

## 2022-06-22 PROCEDURE — 99386 PR PREVENTIVE VISIT,NEW,40-64: ICD-10-PCS | Mod: S$GLB,,, | Performed by: OBSTETRICS & GYNECOLOGY

## 2022-06-22 PROCEDURE — 1159F PR MEDICATION LIST DOCUMENTED IN MEDICAL RECORD: ICD-10-PCS | Mod: CPTII,S$GLB,, | Performed by: OBSTETRICS & GYNECOLOGY

## 2022-06-22 PROCEDURE — 3044F HG A1C LEVEL LT 7.0%: CPT | Mod: CPTII,S$GLB,, | Performed by: OBSTETRICS & GYNECOLOGY

## 2022-06-22 PROCEDURE — 99386 PREV VISIT NEW AGE 40-64: CPT | Mod: S$GLB,,, | Performed by: OBSTETRICS & GYNECOLOGY

## 2022-06-22 PROCEDURE — 3074F SYST BP LT 130 MM HG: CPT | Mod: CPTII,S$GLB,, | Performed by: OBSTETRICS & GYNECOLOGY

## 2022-06-22 NOTE — PROGRESS NOTES
"  SUBJECTIVE:   Darlyn Donato is a 62 y.o. female   for annual well woman exam. No LMP recorded. Patient has had a hysterectomy..  She has no unusual complaints.      S/p supracervical hysterectomy/BSO @ age 49 for ovarian cyst, s/p HRT x 2 months    Past Medical History:   Diagnosis Date    Arthritis     Depression     Early dry stage nonexudative age-related macular degeneration of both eyes 2019    Elevated alkaline phosphatase level     Hyperlipidemia     Nuclear sclerosis of both eyes 2019    Osteopenia     Personal history of female infertility     Respiratory distress     " stopped Breathing" after 2 pain meds were given in a close time frame to one another.    Thyroid disease     multinodular goiter    Vitamin D deficiency disease      Past Surgical History:   Procedure Laterality Date    BREAST BIOPSY Left     over 10 yrs ago    BREAST SURGERY      benign biopsy on left    COLONOSCOPY N/A 2019    Procedure: COLONOSCOPY;  Surgeon: Julian Waldrop MD;  Location: [facility] ([address] [place] [address]);  Service: Endoscopy;  Laterality: N/A;  2nd floor - per anethesia note from Pt hysterectomy , Pt possible difficult intubation - ERW    COLONOSCOPY N/A 2021    Procedure: COLONOSCOPY;  Surgeon: Brody Sanchez MD;  Location: [facility];  Service: Endoscopy;  Laterality: N/A;  pt completed COVID vaccine- see Immunization record in chart-rb  pt reports n/v with anesthesia  covid test 10/1 [place], prep instr portal -ml  8/10 LVM and portal of new arrival time-ml   r/s again due to 10/4 date no OutPts scheduling; Pt wants Early  ONLY  covid elda    HYSTERECTOMY      LAPAROSCOPY W/ MINI-LAPAROTOMY      for fertility issues    OOPHORECTOMY      [place] REMOVAL OF OVARY/TUBE(S)      RECTAL SURGERY      excess skin growth removal    supracervical hysterectomy      TONSILLECTOMY       Social History     Socioeconomic History    Marital status:     Number of children: 0    " Years of education: some colle   Occupational History    Occupation:  law firm     Employer: [employer]   Tobacco Use    Smoking status: Former Smoker     Packs/day: 2.50     Years: 30.00     Pack years: 75.00    Smokeless tobacco: Never Used   Substance and Sexual Activity    Alcohol use: No     Comment: Rare    Drug use: No    Sexual activity: Not Currently     Partners: Male   Social History Narrative    Adult Screenings    Mammogram( for females) done 2011    Pap ( for females)? Needs referral to  GYN    Colonoscopy age  52-Not done yet    Flu shot yearly to be done today  13    Td ?    Pneumovax recommended one time  at age  67    Zostavax recommended one time at  age  62    Eye exam recommended yearly- not done yet     Bone density 2011-osteopenia     Family History   Problem Relation Age of Onset    Breast cancer Mother     Hyperlipidemia Mother     Macular degeneration Mother     Emphysema Father     No Known Problems Sister     No Known Problems Brother     Macular degeneration Maternal Aunt     No Known Problems Maternal Uncle     No Known Problems Paternal Aunt     No Known Problems Paternal Uncle     No Known Problems Maternal Grandmother     No Known Problems Maternal Grandfather     No Known Problems Paternal Grandmother     No Known Problems Paternal Grandfather     Colon cancer Neg Hx     Ovarian cancer Neg Hx     Melanoma Neg Hx     Amblyopia Neg Hx     Blindness Neg Hx     Cancer Neg Hx     Cataracts Neg Hx     Diabetes Neg Hx     Hypertension Neg Hx     Retinal detachment Neg Hx     Strabismus Neg Hx     Stroke Neg Hx     Thyroid disease Neg Hx     Glaucoma Neg Hx      OB History    Para Term  AB Living   1 0 0 0 1 0   SAB IAB Ectopic Multiple Live Births   1 0 0 0 0      # Outcome Date GA Lbr Sam/2nd Weight Sex Delivery Anes PTL Lv   1 SAB               Obstetric Comments   S/p supracervical hysterectomy/BSO @ age 49  for ovarian cyst   Denies abnl pap   MMG 11/2021 neg         Current Outpatient Medications   Medication Sig Dispense Refill    albuterol (PROAIR HFA) 90 mcg/actuation inhaler Inhale 2 puffs into the lungs every 4 (four) hours as needed. 2 HFA Aerosol Inhaler Inhalation Every 4-6 hours 18 g 2    alendronate (FOSAMAX) 70 MG tablet Take 1 tablet (70 mg total) by mouth every 7 days. 4 tablet 11    cholecalciferol, vitamin D3, (VITAMIN D3) 50 mcg (2,000 unit) Tab Take 1 tablet (2,000 Units total) by mouth once daily. 90 tablet 1    diclofenac sodium (VOLTAREN) 1 % Gel Apply 2 g topically 3 (three) times daily. 1 Tube 2    folic acid (FOLVITE) 1 MG tablet TAKE 1 TABLET(1 MG) BY MOUTH EVERY DAY 30 tablet 11    levothyroxine (SYNTHROID) 100 MCG tablet Take 1 tablet (100 mcg total) by mouth once daily. Fill brand name 30 tablet 11    methotrexate, PF, (OTREXUP, PF,) 25 mg/0.4 mL AtIn Inject 0.4 mLs (25 mg total) into the skin every 7 days. 1.6 mL 4    multivit-min/ferrous gluconate (MULTI-DONNIE ORAL) Take by mouth.      mupirocin (BACTROBAN) 2 % ointment Apply to affected area 3 times daily 22 g 1     No current facility-administered medications for this visit.     Allergies: Hydromorphone, Percocet  [oxycodone-acetaminophen], and Vicodin  [hydrocodone-acetaminophen]       ROS:  GENERAL: Denies weight gain or weight loss. Feeling well overall.   SKIN: Denies rash or lesions.   HEAD: Denies head injury or headache.   NODES: Denies enlarged lymph nodes.   CHEST: Denies chest pain or shortness of breath.   CARDIOVASCULAR: Denies palpitations or left sided chest pain.   ABDOMEN: No abdominal pain, constipation, diarrhea, nausea, vomiting or rectal bleeding.   URINARY: No frequency, dysuria, hematuria, or burning on urination.  REPRODUCTIVE: Denies vaginal discharge, abnormal vaginal bleeding, lesions, pelvic pain  BREASTS: The patient performs breast self-examination and denies pain, lumps, or nipple discharge.  "  HEMATOLOGIC: No easy bruisability or excessive bleeding.  MUSCULOSKELETAL: Denies joint pain or swelling.   NEUROLOGIC: Denies syncope or weakness.   PSYCHIATRIC: Denies depression, anxiety or mood swings.      OBJECTIVE:   /66 (BP Location: Right arm, Patient Position: Sitting, BP Method: Medium (Manual))   Ht 5' 2" (1.575 m)   Wt 69.8 kg (153 lb 14.1 oz)   BMI 28.15 kg/m²   The patient appears well, alert, oriented x 3, in no distress.  PSYCH:  Normal, full range of affect  NECK: negative, no thyromegaly, trachea midline  SKIN: normal, good color, good turgor and no acne, striae, hirsutism  BREAST EXAM: breasts appear normal, no suspicious masses, no skin or nipple changes or axillary nodes  ABDOMEN: soft, non-tender; bowel sounds normal; no masses,  no organomegaly and no hernias, masses, or hepatosplenomegaly  GENITALIA: normal external genitalia, no erythema, no discharge  BLADDER: soft  URETHRA: normal appearing urethra with no masses, tenderness or lesions and normal urethra, normal urethral meatus  VAGINA: Normal  CERVIX: no lesions or cervical motion tenderness  UTERUS: uterus absent  ADNEXA: no mass, fullness, tenderness      ASSESSMENT:   1. Health maintenance  -pap done. Discussed ASCCP guideline screening every 3 - 5 years.   -screening MMG ordered  -counseled on exercise and healthy diet,  -bone health:  Discussed Vitamin D and Calcium supplementation, weight bearing exercises  2.  Discussed safety at home/school/work, healthy and balanced diet, sleep hygiene, as well as violence/weapons exposure.     "

## 2022-07-02 LAB
CLINICAL INFO: NORMAL
CYTO CVX: NORMAL
CYTOLOGIST CVX/VAG CYTO: NORMAL
CYTOLOGIST CVX/VAG CYTO: NORMAL
CYTOLOGY CMNT CVX/VAG CYTO-IMP: NORMAL
CYTOLOGY PAP THIN PREP EXPLANATION: NORMAL
DATE OF PREVIOUS PAP: NORMAL
DATE PREVIOUS BX: NO
GEN CATEG CVX/VAG CYTO-IMP: NORMAL
HPV I/H RISK 4 DNA CVX QL NAA+PROBE: NOT DETECTED
LMP START DATE: 2007
MICROORGANISM CVX/VAG CYTO: NORMAL
PATHOLOGIST CVX/VAG CYTO: NORMAL
SERVICE CMNT-IMP: NORMAL
SPECIMEN SOURCE CVX/VAG CYTO: NORMAL
STAT OF ADQ CVX/VAG CYTO-IMP: NORMAL

## 2022-07-07 NOTE — PROGRESS NOTES
Hi,     Your pap smear is normal.  I    Let me know if you have any questions.  Thanks.     Dr. Juan Carlos Stewart

## 2022-07-12 ENCOUNTER — SPECIALTY PHARMACY (OUTPATIENT)
Dept: PHARMACY | Facility: CLINIC | Age: 62
End: 2022-07-12
Payer: COMMERCIAL

## 2022-07-12 NOTE — TELEPHONE ENCOUNTER
Specialty Pharmacy - Refill Coordination    Specialty Medication Orders Linked to Encounter    Flowsheet Row Most Recent Value   Medication #1 methotrexate, PF, (OTREXUP, PF,) 25 mg/0.4 mL AtIn (Order#[id number], Rx#[id number])        Insurance is only allowing a 90 DS as of 7/12/22. Informed pt of this and she verbalized understanding.     Refill Questions - Documented Responses    Flowsheet Row Most Recent Value   Patient Availability and HIPAA Verification    Does patient want to proceed with activity? Yes   HIPAA/medical authority confirmed? Yes   Relationship to patient of person spoken to? Self   Refill Screening Questions    Changes to allergies? No   Changes to medications? No   New conditions since last clinic visit? No   Unplanned office visit, urgent care, ED, or hospital admission in the last 4 weeks? No   How does patient/caregiver feel medication is working? Good   Financial problems or insurance changes? No   How many doses of your specialty medications were missed in the last 4 weeks? 0   Would patient like to speak to a pharmacist? No   When does the patient need to receive the medication? 07/22/22   Refill Delivery Questions    How will the patient receive the medication? Delivery [facility]   When does the patient need to receive the medication? 07/22/22   Shipping Address Home   Address in [facility] confirmed and updated if neccessary? Yes   Expected Copay ($) 0   Is the patient able to afford the medication copay? Yes   Payment Method zero copay   Days supply of Refill 84   Supplies needed? No supplies needed   Refill activity completed? Yes   Refill activity plan Refill scheduled   Shipment/Pickup Date: 07/21/22          Current Outpatient Medications   Medication Sig    albuterol (PROAIR HFA) 90 mcg/actuation inhaler Inhale 2 puffs into the lungs every 4 (four) hours as needed. 2 HFA Aerosol Inhaler Inhalation Every 4-6 hours    alendronate (FOSAMAX) 70 MG tablet Take 1 tablet (70 mg  total) by mouth every 7 days.    cholecalciferol, vitamin D3, (VITAMIN D3) 50 mcg (2,000 unit) Tab Take 1 tablet (2,000 Units total) by mouth once daily.    diclofenac sodium (VOLTAREN) 1 % Gel Apply 2 g topically 3 (three) times daily.    folic acid (FOLVITE) 1 MG tablet TAKE 1 TABLET(1 MG) BY MOUTH EVERY DAY    levothyroxine (SYNTHROID) 100 MCG tablet Take 1 tablet (100 mcg total) by mouth once daily. Fill brand name    methotrexate, PF, (OTREXUP, PF,) 25 mg/0.4 mL AtIn Inject 0.4 mLs (25 mg total) into the skin every 7 days.    multivit-min/ferrous gluconate (MULTI-DONNIE ORAL) Take by mouth.    mupirocin (BACTROBAN) 2 % ointment Apply to affected area 3 times daily   Last reviewed on 6/22/2022  8:35 AM by Kathy Parikh MA    Review of patient's allergies indicates:   Allergen Reactions    Hydromorphone Shortness Of Breath    Percocet  [oxycodone-acetaminophen] Itching and Nausea Only    Vicodin  [hydrocodone-acetaminophen] Nausea Only and Nausea And Vomiting    Last reviewed on  6/22/2022 8:35 AM by Kathy Parikh      Tasks added this encounter   10/3/2022 - Refill Call (Auto Added)   Tasks due within next 3 months   No tasks due.     Jenn Arredondo, Patient Care Assistant  [facility]  [address]  [facility] [address]  Phone: [phone]  Fax: [phone]

## 2022-08-20 ENCOUNTER — LAB VISIT (OUTPATIENT)
Dept: LAB | Facility: HOSPITAL | Age: 62
End: 2022-08-20
Attending: INTERNAL MEDICINE
Payer: COMMERCIAL

## 2022-08-20 DIAGNOSIS — Z79.60 LONG-TERM USE OF IMMUNOSUPPRESSANT MEDICATION: ICD-10-CM

## 2022-08-20 DIAGNOSIS — M05.79 RHEUMATOID ARTHRITIS INVOLVING MULTIPLE SITES WITH POSITIVE RHEUMATOID FACTOR: ICD-10-CM

## 2022-08-20 LAB
ALBUMIN SERPL BCP-MCNC: 3.5 G/DL (ref 3.5–5.2)
ALP SERPL-CCNC: 117 U/L (ref 55–135)
ALT SERPL W/O P-5'-P-CCNC: 15 U/L (ref 10–44)
ANION GAP SERPL CALC-SCNC: 8 MMOL/L (ref 8–16)
AST SERPL-CCNC: 14 U/L (ref 10–40)
BASOPHILS # BLD AUTO: 0.07 K/UL (ref 0–0.2)
BASOPHILS NFR BLD: 0.8 % (ref 0–1.9)
BILIRUB SERPL-MCNC: 0.4 MG/DL (ref 0.1–1)
BUN SERPL-MCNC: 7 MG/DL (ref 6–20)
CALCIUM SERPL-MCNC: 9.2 MG/DL (ref 8.7–10.5)
CHLORIDE SERPL-SCNC: 107 MMOL/L (ref 95–110)
CO2 SERPL-SCNC: 26 MMOL/L (ref 23–29)
CREAT SERPL-MCNC: 0.7 MG/DL (ref 0.5–1.4)
CRP SERPL-MCNC: 12.9 MG/L (ref 0–8.2)
DIFFERENTIAL METHOD: ABNORMAL
EOSINOPHIL # BLD AUTO: 0.3 K/UL (ref 0–0.5)
EOSINOPHIL NFR BLD: 3.2 % (ref 0–8)
ERYTHROCYTE [DISTWIDTH] IN BLOOD BY AUTOMATED COUNT: 14 % (ref 11.5–14.5)
ERYTHROCYTE [SEDIMENTATION RATE] IN BLOOD BY PHOTOMETRIC METHOD: 39 MM/HR (ref 0–36)
EST. GFR  (NO RACE VARIABLE): >60 ML/MIN/1.73 M^2
GLUCOSE SERPL-MCNC: 82 MG/DL (ref 70–110)
HCT VFR BLD AUTO: 42.1 % (ref 37–48.5)
HGB BLD-MCNC: 13.4 G/DL (ref 12–16)
IMM GRANULOCYTES # BLD AUTO: 0.02 K/UL (ref 0–0.04)
IMM GRANULOCYTES NFR BLD AUTO: 0.2 % (ref 0–0.5)
LYMPHOCYTES # BLD AUTO: 2.3 K/UL (ref 1–4.8)
LYMPHOCYTES NFR BLD: 25.6 % (ref 18–48)
MCH RBC QN AUTO: 29.5 PG (ref 27–31)
MCHC RBC AUTO-ENTMCNC: 31.8 G/DL (ref 32–36)
MCV RBC AUTO: 93 FL (ref 82–98)
MONOCYTES # BLD AUTO: 0.7 K/UL (ref 0.3–1)
MONOCYTES NFR BLD: 8.3 % (ref 4–15)
NEUTROPHILS # BLD AUTO: 5.5 K/UL (ref 1.8–7.7)
NEUTROPHILS NFR BLD: 61.9 % (ref 38–73)
NRBC BLD-RTO: 0 /100 WBC
PLATELET # BLD AUTO: 334 K/UL (ref 150–450)
PMV BLD AUTO: 10.4 FL (ref 9.2–12.9)
POTASSIUM SERPL-SCNC: 4 MMOL/L (ref 3.5–5.1)
PROT SERPL-MCNC: 6.8 G/DL (ref 6–8.4)
RBC # BLD AUTO: 4.55 M/UL (ref 4–5.4)
SODIUM SERPL-SCNC: 141 MMOL/L (ref 136–145)
WBC # BLD AUTO: 8.94 K/UL (ref 3.9–12.7)

## 2022-08-20 PROCEDURE — 86140 C-REACTIVE PROTEIN: CPT | Performed by: INTERNAL MEDICINE

## 2022-08-20 PROCEDURE — 80053 COMPREHEN METABOLIC PANEL: CPT | Performed by: INTERNAL MEDICINE

## 2022-08-20 PROCEDURE — 36415 COLL VENOUS BLD VENIPUNCTURE: CPT | Mod: PO | Performed by: INTERNAL MEDICINE

## 2022-08-20 PROCEDURE — 85652 RBC SED RATE AUTOMATED: CPT | Performed by: INTERNAL MEDICINE

## 2022-08-20 PROCEDURE — 85025 COMPLETE CBC W/AUTO DIFF WBC: CPT | Performed by: INTERNAL MEDICINE

## 2022-10-03 ENCOUNTER — SPECIALTY PHARMACY (OUTPATIENT)
Dept: PHARMACY | Facility: CLINIC | Age: 62
End: 2022-10-03
Payer: COMMERCIAL

## 2022-10-03 DIAGNOSIS — M05.79 RHEUMATOID ARTHRITIS INVOLVING MULTIPLE SITES WITH POSITIVE RHEUMATOID FACTOR: ICD-10-CM

## 2022-10-03 RX ORDER — METHOTREXATE 25 MG/.4ML
25 INJECTION, SOLUTION SUBCUTANEOUS
Qty: 1.6 ML | Refills: 4 | Status: SHIPPED | OUTPATIENT
Start: 2022-10-03 | End: 2023-02-27 | Stop reason: SDUPTHER

## 2022-10-03 NOTE — TELEPHONE ENCOUNTER
Specialty Pharmacy - Refill Coordination    Specialty Medication Orders Linked to Encounter      Flowsheet Row Most Recent Value   Medication #1 methotrexate, PF, (OTREXUP, PF,) 25 mg/0.4 mL AtIn (Order#[id number], Rx#)            Refill Questions - Documented Responses      Flowsheet Row Most Recent Value   Patient Availability and HIPAA Verification    Does patient want to proceed with activity? Yes   HIPAA/medical authority confirmed? Yes   Relationship to patient of person spoken to? Self   Refill Screening Questions    Changes to allergies? No   Changes to medications? No   New conditions since last clinic visit? No   Unplanned office visit, urgent care, ED, or hospital admission in the last 4 weeks? No   How does patient/caregiver feel medication is working? Good   Financial problems or insurance changes? No   How many doses of your specialty medications were missed in the last 4 weeks? 0   Would patient like to speak to a pharmacist? No   When does the patient need to receive the medication? 10/10/22   Refill Delivery Questions    How will the patient receive the medication? MEDRx   When does the patient need to receive the medication? 10/10/22   Shipping Address Home   Address in [facility] confirmed and updated if neccessary? Yes   Expected Copay ($) 0   Is the patient able to afford the medication copay? Yes   Payment Method zero copay   Days supply of Refill 28   Supplies needed? No supplies needed   Refill activity completed? Yes   Refill activity plan Refill scheduled   Shipment/Pickup Date: 10/06/22            Current Outpatient Medications   Medication Sig    albuterol (PROAIR HFA) 90 mcg/actuation inhaler Inhale 2 puffs into the lungs every 4 (four) hours as needed. 2 HFA Aerosol Inhaler Inhalation Every 4-6 hours    alendronate (FOSAMAX) 70 MG tablet Take 1 tablet (70 mg total) by mouth every 7 days.    cholecalciferol, vitamin D3, (VITAMIN D3) 50 mcg (2,000 unit) Tab Take 1 tablet (2,000  Units total) by mouth once daily.    diclofenac sodium (VOLTAREN) 1 % Gel Apply 2 g topically 3 (three) times daily.    folic acid (FOLVITE) 1 MG tablet TAKE 1 TABLET(1 MG) BY MOUTH EVERY DAY    levothyroxine (SYNTHROID) 100 MCG tablet Take 1 tablet (100 mcg total) by mouth once daily. Fill brand name    methotrexate, PF, (OTREXUP, PF,) 25 mg/0.4 mL AtIn Inject 0.4 mLs (25 mg total) into the skin every 7 days.    multivit-min/ferrous gluconate (MULTI-DONNIE ORAL) Take by mouth.    mupirocin (BACTROBAN) 2 % ointment Apply to affected area 3 times daily   Last reviewed on 6/22/2022  8:35 AM by Kathy Parikh MA    Review of patient's allergies indicates:   Allergen Reactions    Hydromorphone Shortness Of Breath    Percocet  [oxycodone-acetaminophen] Itching and Nausea Only    Vicodin  [hydrocodone-acetaminophen] Nausea Only and Nausea And Vomiting    Last reviewed on  6/22/2022 8:35 AM by Kathy Parikh      Tasks added this encounter   10/31/2022 - Refill Call (Auto Added)   Tasks due within next 3 months   No tasks due.     [facility]  [address]  [facility] [address]  Phone: [phone]  Fax: [phone]

## 2022-11-04 ENCOUNTER — SPECIALTY PHARMACY (OUTPATIENT)
Dept: PHARMACY | Facility: CLINIC | Age: 62
End: 2022-11-04
Payer: COMMERCIAL

## 2022-11-04 NOTE — TELEPHONE ENCOUNTER
Specialty Pharmacy - Refill Coordination    Specialty Medication Orders Linked to Encounter      Flowsheet Row Most Recent Value   Medication #1 methotrexate, PF, (OTREXUP, PF,) 25 mg/0.4 mL AtIn (Order#[id number], Rx#[id number])            Refill Questions - Documented Responses      Flowsheet Row Most Recent Value   Patient Availability and HIPAA Verification    Does patient want to proceed with activity? Yes   HIPAA/medical authority confirmed? Yes   Relationship to patient of person spoken to? Self   Refill Screening Questions    Changes to allergies? No   Changes to medications? No   New conditions since last clinic visit? No   Unplanned office visit, urgent care, ED, or hospital admission in the last 4 weeks? No   How does patient/caregiver feel medication is working? Good   Financial problems or insurance changes? No   How many doses of your specialty medications were missed in the last 4 weeks? 0   Would patient like to speak to a pharmacist? No   When does the patient need to receive the medication? 11/13/22   Refill Delivery Questions    How will the patient receive the medication? MEDRx   When does the patient need to receive the medication? 11/13/22   Shipping Address Home   Address in [facility] confirmed and updated if neccessary? Yes   Expected Copay ($) 0   Is the patient able to afford the medication copay? Yes   Payment Method zero copay   Days supply of Refill 28   Supplies needed? No supplies needed   Refill activity completed? Yes   Refill activity plan Refill scheduled   Shipment/Pickup Date: 11/07/22            Current Outpatient Medications   Medication Sig    albuterol (PROAIR HFA) 90 mcg/actuation inhaler Inhale 2 puffs into the lungs every 4 (four) hours as needed. 2 HFA Aerosol Inhaler Inhalation Every 4-6 hours    alendronate (FOSAMAX) 70 MG tablet Take 1 tablet (70 mg total) by mouth every 7 days.    cholecalciferol, vitamin D3, (VITAMIN D3) 50 mcg (2,000 unit) Tab Take 1 tablet  (2,000 Units total) by mouth once daily.    diclofenac sodium (VOLTAREN) 1 % Gel Apply 2 g topically 3 (three) times daily.    folic acid (FOLVITE) 1 MG tablet TAKE 1 TABLET(1 MG) BY MOUTH EVERY DAY    levothyroxine (SYNTHROID) 100 MCG tablet Take 1 tablet (100 mcg total) by mouth once daily. Fill brand name    methotrexate, PF, (OTREXUP, PF,) 25 mg/0.4 mL AtIn Inject 0.4 mLs (25 mg total) into the skin every 7 days.    multivit-min/ferrous gluconate (MULTI-DONNIE ORAL) Take by mouth.    mupirocin (BACTROBAN) 2 % ointment Apply to affected area 3 times daily   Last reviewed on 6/22/2022  8:35 AM by Kathy Parikh MA    Review of patient's allergies indicates:   Allergen Reactions    Hydromorphone Shortness Of Breath    Percocet  [oxycodone-acetaminophen] Itching and Nausea Only    Vicodin  [hydrocodone-acetaminophen] Nausea Only and Nausea And Vomiting    Last reviewed on  6/22/2022 8:35 AM by Kathy Parikh      Tasks added this encounter   12/4/2022 - Refill Call (Auto Added)   Tasks due within next 3 months   No tasks due.     Fe Abarca, PharmD  [facility]  [address] [place] [address]  Phone: [phone]  Fax: [phone]

## 2022-11-25 ENCOUNTER — HOSPITAL ENCOUNTER (OUTPATIENT)
Dept: RADIOLOGY | Facility: HOSPITAL | Age: 62
Discharge: HOME | End: 2022-11-25
Attending: OBSTETRICS & GYNECOLOGY
Payer: COMMERCIAL

## 2022-11-25 DIAGNOSIS — Z12.31 BREAST CANCER SCREENING BY MAMMOGRAM: ICD-10-CM

## 2022-11-25 PROCEDURE — 77067 SCR MAMMO BI INCL CAD: CPT | Mod: 26,,, | Performed by: RADIOLOGY

## 2022-11-25 PROCEDURE — 77067 MAMMO DIGITAL SCREENING BILAT WITH TOMO: ICD-10-PCS | Mod: 26,,, | Performed by: RADIOLOGY

## 2022-11-25 PROCEDURE — 77063 BREAST TOMOSYNTHESIS BI: CPT | Mod: TC,PO

## 2022-11-25 PROCEDURE — 77063 BREAST TOMOSYNTHESIS BI: CPT | Mod: 26,,, | Performed by: RADIOLOGY

## 2022-11-25 PROCEDURE — 77063 MAMMO DIGITAL SCREENING BILAT WITH TOMO: ICD-10-PCS | Mod: 26,,, | Performed by: RADIOLOGY

## 2022-11-25 PROCEDURE — 77067 SCR MAMMO BI INCL CAD: CPT | Mod: TC,PO

## 2022-12-05 ENCOUNTER — SPECIALTY PHARMACY (OUTPATIENT)
Dept: PHARMACY | Facility: CLINIC | Age: 62
End: 2022-12-05
Payer: COMMERCIAL

## 2022-12-05 NOTE — TELEPHONE ENCOUNTER
Specialty Pharmacy - Refill Coordination    Specialty Medication Orders Linked to Encounter      Flowsheet Row Most Recent Value   Medication #1 methotrexate, PF, (OTREXUP, PF,) 25 mg/0.4 mL AtIn (Order#[id number], Rx#[id number])            Refill Questions - Documented Responses      Flowsheet Row Most Recent Value   Patient Availability and HIPAA Verification    Does patient want to proceed with activity? Yes   HIPAA/medical authority confirmed? Yes   Relationship to patient of person spoken to? Self   Refill Screening Questions    Changes to allergies? No   Changes to medications? No   New conditions since last clinic visit? No   Unplanned office visit, urgent care, ED, or hospital admission in the last 4 weeks? No   How does patient/caregiver feel medication is working? Excellent   Financial problems or insurance changes? No   How many doses of your specialty medications were missed in the last 4 weeks? 0   Would patient like to speak to a pharmacist? No   When does the patient need to receive the medication? 12/11/22   Refill Delivery Questions    How will the patient receive the medication? MEDRx   When does the patient need to receive the medication? 12/11/22   Shipping Address Home   Address in [facility] confirmed and updated if neccessary? Yes   Expected Copay ($) 0   Is the patient able to afford the medication copay? Yes   Payment Method zero copay   Days supply of Refill 28   Supplies needed? No supplies needed   Refill activity completed? Yes   Refill activity plan Refill scheduled   Shipment/Pickup Date: 12/08/22            Current Outpatient Medications   Medication Sig    albuterol (PROAIR HFA) 90 mcg/actuation inhaler Inhale 2 puffs into the lungs every 4 (four) hours as needed. 2 HFA Aerosol Inhaler Inhalation Every 4-6 hours    alendronate (FOSAMAX) 70 MG tablet Take 1 tablet (70 mg total) by mouth every 7 days.    cholecalciferol, vitamin D3, (VITAMIN D3) 50 mcg (2,000 unit) Tab Take 1  tablet (2,000 Units total) by mouth once daily.    diclofenac sodium (VOLTAREN) 1 % Gel Apply 2 g topically 3 (three) times daily.    folic acid (FOLVITE) 1 MG tablet TAKE 1 TABLET(1 MG) BY MOUTH EVERY DAY    levothyroxine (SYNTHROID) 100 MCG tablet Take 1 tablet (100 mcg total) by mouth once daily. Fill brand name    methotrexate, PF, (OTREXUP, PF,) 25 mg/0.4 mL AtIn Inject 0.4 mLs (25 mg total) into the skin every 7 days.    multivit-min/ferrous gluconate (MULTI-DONNIE ORAL) Take by mouth.    mupirocin (BACTROBAN) 2 % ointment Apply to affected area 3 times daily   Last reviewed on 6/22/2022  8:35 AM by Kathy Parikh MA    Review of patient's allergies indicates:   Allergen Reactions    Hydromorphone Shortness Of Breath    Percocet  [oxycodone-acetaminophen] Itching and Nausea Only    Vicodin  [hydrocodone-acetaminophen] Nausea Only and Nausea And Vomiting    Last reviewed on  6/22/2022 8:35 AM by Kathy Parikh      Tasks added this encounter   No tasks added.   Tasks due within next 3 months   12/4/2022 - Refill Call (Auto Added)     Navi Mccall, PharmD  [facility]  [address] [place] [address]  Phone: [phone]  Fax: [phone]

## 2023-01-04 ENCOUNTER — SPECIALTY PHARMACY (OUTPATIENT)
Dept: PHARMACY | Facility: CLINIC | Age: 63
End: 2023-01-04
Payer: COMMERCIAL

## 2023-01-04 NOTE — TELEPHONE ENCOUNTER
Specialty Pharmacy - Refill Coordination    Specialty Medication Orders Linked to Encounter      Flowsheet Row Most Recent Value   Medication #1 methotrexate, PF, (OTREXUP, PF,) 25 mg/0.4 mL AtIn (Order#[id number], Rx#)            Refill Questions - Documented Responses      Flowsheet Row Most Recent Value   Patient Availability and HIPAA Verification    Does patient want to proceed with activity? Yes   HIPAA/medical authority confirmed? Yes   Relationship to patient of person spoken to? Self   Refill Screening Questions    Changes to allergies? No   Changes to medications? No   New conditions since last clinic visit? No   Unplanned office visit, urgent care, ED, or hospital admission in the last 4 weeks? No   How does patient/caregiver feel medication is working? Good   Financial problems or insurance changes? No   How many doses of your specialty medications were missed in the last 4 weeks? 0   Would patient like to speak to a pharmacist? No   When does the patient need to receive the medication? 01/08/23   Refill Delivery Questions    How will the patient receive the medication? MEDRx   When does the patient need to receive the medication? 01/08/23   Shipping Address Home   Address in [facility] confirmed and updated if neccessary? Yes   Expected Copay ($) 0   Is the patient able to afford the medication copay? Yes   Payment Method zero copay   Days supply of Refill 28   Supplies needed? No supplies needed   Refill activity completed? Yes   Refill activity plan Refill scheduled   Shipment/Pickup Date: 01/05/23            Current Outpatient Medications   Medication Sig    albuterol (PROAIR HFA) 90 mcg/actuation inhaler Inhale 2 puffs into the lungs every 4 (four) hours as needed. 2 HFA Aerosol Inhaler Inhalation Every 4-6 hours    alendronate (FOSAMAX) 70 MG tablet Take 1 tablet (70 mg total) by mouth every 7 days.    cholecalciferol, vitamin D3, (VITAMIN D3) 50 mcg (2,000 unit) Tab Take 1 tablet (2,000  Units total) by mouth once daily.    diclofenac sodium (VOLTAREN) 1 % Gel Apply 2 g topically 3 (three) times daily.    folic acid (FOLVITE) 1 MG tablet TAKE 1 TABLET(1 MG) BY MOUTH EVERY DAY    levothyroxine (SYNTHROID) 100 MCG tablet Take 1 tablet (100 mcg total) by mouth once daily. Fill brand name    methotrexate, PF, (OTREXUP, PF,) 25 mg/0.4 mL AtIn Inject 0.4 mLs (25 mg total) into the skin every 7 days.    multivit-min/ferrous gluconate (MULTI-DONNIE ORAL) Take by mouth.    mupirocin (BACTROBAN) 2 % ointment Apply to affected area 3 times daily   Last reviewed on 6/22/2022  8:35 AM by Kathy Parikh MA    Review of patient's allergies indicates:   Allergen Reactions    Hydromorphone Shortness Of Breath    Percocet  [oxycodone-acetaminophen] Itching and Nausea Only    Vicodin  [hydrocodone-acetaminophen] Nausea Only and Nausea And Vomiting    Last reviewed on  6/22/2022 8:35 AM by Kathy Parikh      Tasks added this encounter   1/29/2023 - Refill Call (Auto Added)   Tasks due within next 3 months   3/8/2023 - Clinical - Follow Up Assesement (Annual)     [facility]  [address] [place] [id number]  Phone: [phone]  Fax: [phone]

## 2023-01-30 ENCOUNTER — SPECIALTY PHARMACY (OUTPATIENT)
Dept: PHARMACY | Facility: CLINIC | Age: 63
End: 2023-01-30
Payer: COMMERCIAL

## 2023-01-30 NOTE — TELEPHONE ENCOUNTER
Specialty Pharmacy - Refill Coordination    Specialty Medication Orders Linked to Encounter      Flowsheet Row Most Recent Value   Medication #1 methotrexate, PF, (OTREXUP, PF,) 25 mg/0.4 mL AtIn (Order#[id number], Rx#)            Refill Questions - Documented Responses      Flowsheet Row Most Recent Value   Patient Availability and HIPAA Verification    Does patient want to proceed with activity? Yes   HIPAA/medical authority confirmed? Yes   Relationship to patient of person spoken to? Self   Refill Screening Questions    Changes to allergies? No   Changes to medications? No   New conditions since last clinic visit? No   Unplanned office visit, urgent care, ED, or hospital admission in the last 4 weeks? No   How does patient/caregiver feel medication is working? Good   Financial problems or insurance changes? No   How many doses of your specialty medications were missed in the last 4 weeks? 0   Would patient like to speak to a pharmacist? No   When does the patient need to receive the medication? 02/04/23   Refill Delivery Questions    How will the patient receive the medication? MEDRx   When does the patient need to receive the medication? 02/04/23   Shipping Address Home   Address in [facility] confirmed and updated if neccessary? Yes   Expected Copay ($) 0   Is the patient able to afford the medication copay? Yes   Payment Method zero copay   Days supply of Refill 28   Supplies needed? No supplies needed   Refill activity completed? Yes   Refill activity plan Refill scheduled   Shipment/Pickup Date: 02/01/23            Current Outpatient Medications   Medication Sig    albuterol (PROAIR HFA) 90 mcg/actuation inhaler Inhale 2 puffs into the lungs every 4 (four) hours as needed. 2 HFA Aerosol Inhaler Inhalation Every 4-6 hours    alendronate (FOSAMAX) 70 MG tablet Take 1 tablet (70 mg total) by mouth every 7 days.    cholecalciferol, vitamin D3, (VITAMIN D3) 50 mcg (2,000 unit) Tab Take 1 tablet (2,000  Units total) by mouth once daily.    diclofenac sodium (VOLTAREN) 1 % Gel Apply 2 g topically 3 (three) times daily.    folic acid (FOLVITE) 1 MG tablet TAKE 1 TABLET(1 MG) BY MOUTH EVERY DAY    levothyroxine (SYNTHROID) 100 MCG tablet Take 1 tablet (100 mcg total) by mouth once daily. Fill brand name    methotrexate, PF, (OTREXUP, PF,) 25 mg/0.4 mL AtIn Inject 0.4 mLs (25 mg total) into the skin every 7 days.    multivit-min/ferrous gluconate (MULTI-DONNIE ORAL) Take by mouth.    mupirocin (BACTROBAN) 2 % ointment Apply to affected area 3 times daily   Last reviewed on 6/22/2022  8:35 AM by Kathy Parikh MA    Review of patient's allergies indicates:   Allergen Reactions    Hydromorphone Shortness Of Breath    Percocet  [oxycodone-acetaminophen] Itching and Nausea Only    Vicodin  [hydrocodone-acetaminophen] Nausea Only and Nausea And Vomiting    Last reviewed on  6/22/2022 8:35 AM by Kathy Parikh      Tasks added this encounter   2/25/2023 - Refill Call (Auto Added)   Tasks due within next 3 months   3/8/2023 - Clinical - Follow Up Assesement (Annual)     [facility]  [address] [place] [address]  Phone: [phone]  Fax: [phone]

## 2023-02-27 ENCOUNTER — SPECIALTY PHARMACY (OUTPATIENT)
Dept: PHARMACY | Facility: CLINIC | Age: 63
End: 2023-02-27
Payer: COMMERCIAL

## 2023-02-27 DIAGNOSIS — M05.79 RHEUMATOID ARTHRITIS INVOLVING MULTIPLE SITES WITH POSITIVE RHEUMATOID FACTOR: ICD-10-CM

## 2023-02-27 RX ORDER — METHOTREXATE 25 MG/.4ML
25 INJECTION, SOLUTION SUBCUTANEOUS
Qty: 1.6 ML | Refills: 0 | Status: SHIPPED | OUTPATIENT
Start: 2023-02-27 | End: 2023-04-18 | Stop reason: SDUPTHER

## 2023-02-27 NOTE — TELEPHONE ENCOUNTER
Outgoing call regarding otrexup refill; per pt, she's due to inject on 3/5; informed her that a refill request was sent to md, and once approved [facility] will follow up to schedule delivery

## 2023-03-01 NOTE — TELEPHONE ENCOUNTER
Specialty Pharmacy - Refill Coordination    Specialty Medication Orders Linked to Encounter      Flowsheet Row Most Recent Value   Medication #1 methotrexate, PF, (OTREXUP, PF,) 25 mg/0.4 mL AtIn (Order#[id number], Rx#)            Refill Questions - Documented Responses      Flowsheet Row Most Recent Value   Patient Availability and HIPAA Verification    Does patient want to proceed with activity? Yes   HIPAA/medical authority confirmed? Yes   Relationship to patient of person spoken to? Self   Refill Screening Questions    Changes to allergies? No   Changes to medications? No   New conditions since last clinic visit? No   Unplanned office visit, urgent care, ED, or hospital admission in the last 4 weeks? No   How does patient/caregiver feel medication is working? Fair   Financial problems or insurance changes? No   How many doses of your specialty medications were missed in the last 4 weeks? 0   Would patient like to speak to a pharmacist? No   When does the patient need to receive the medication? 03/05/23   Refill Delivery Questions    How will the patient receive the medication? MEDRx   When does the patient need to receive the medication? 03/05/23   Shipping Address Home   Address in [facility] confirmed and updated if neccessary? Yes   Expected Copay ($) 0   Is the patient able to afford the medication copay? Yes   Payment Method zero copay   Days supply of Refill 28   Supplies needed? No supplies needed   Refill activity completed? Yes   Refill activity plan Refill scheduled   Shipment/Pickup Date: 03/02/23            Current Outpatient Medications   Medication Sig    albuterol (PROAIR HFA) 90 mcg/actuation inhaler Inhale 2 puffs into the lungs every 4 (four) hours as needed. 2 HFA Aerosol Inhaler Inhalation Every 4-6 hours    alendronate (FOSAMAX) 70 MG tablet Take 1 tablet (70 mg total) by mouth every 7 days.    cholecalciferol, vitamin D3, (VITAMIN D3) 50 mcg (2,000 unit) Tab Take 1 tablet (2,000  Units total) by mouth once daily.    diclofenac sodium (VOLTAREN) 1 % Gel Apply 2 g topically 3 (three) times daily.    folic acid (FOLVITE) 1 MG tablet TAKE 1 TABLET(1 MG) BY MOUTH EVERY DAY    levothyroxine (SYNTHROID) 100 MCG tablet Take 1 tablet (100 mcg total) by mouth once daily. Fill brand name    methotrexate, PF, (OTREXUP, PF,) 25 mg/0.4 mL AtIn Inject 0.4 mLs (25 mg total) into the skin every 7 days.    multivit-min/ferrous gluconate (MULTI-DONNIE ORAL) Take by mouth.    mupirocin (BACTROBAN) 2 % ointment Apply to affected area 3 times daily   Last reviewed on 6/22/2022  8:35 AM by Kathy Parikh MA    Review of patient's allergies indicates:   Allergen Reactions    Hydromorphone Shortness Of Breath    Percocet  [oxycodone-acetaminophen] Itching and Nausea Only    Vicodin  [hydrocodone-acetaminophen] Nausea Only and Nausea And Vomiting    Last reviewed on  6/22/2022 8:35 AM by Kathy Parikh      Tasks added this encounter   3/26/2023 - Refill Call (Auto Added)   Tasks due within next 3 months   3/8/2023 - Clinical - Follow Up Assesement (Annual)     Armida Kyle, PharmD  [facility]  [facility] [address]  [facility] [address]  Phone: [phone]  Fax: [phone]

## 2023-03-27 ENCOUNTER — SPECIALTY PHARMACY (OUTPATIENT)
Dept: PHARMACY | Facility: CLINIC | Age: 63
End: 2023-03-27
Payer: COMMERCIAL

## 2023-03-27 DIAGNOSIS — M05.79 RHEUMATOID ARTHRITIS INVOLVING MULTIPLE SITES WITH POSITIVE RHEUMATOID FACTOR: ICD-10-CM

## 2023-03-27 RX ORDER — METHOTREXATE 25 MG/.4ML
25 INJECTION, SOLUTION SUBCUTANEOUS
Qty: 1.6 ML | Refills: 0 | OUTPATIENT
Start: 2023-03-27 | End: 2023-04-26

## 2023-03-27 NOTE — TELEPHONE ENCOUNTER
Specialty Pharmacy - Clinical Reassessment    Specialty Medication Orders Linked to Encounter      Flowsheet Row Most Recent Value   Medication #1 methotrexate, PF, (OTREXUP, PF,) 25 mg/0.4 mL AtIn (Order#[id number], Rx#[id number])          Patient Diagnosis   M05.79 - Rheumatoid arthritis involving multiple sites with positive rheumatoid factor    Darlyn Donato is a 63 y.o. female, who is followed by the specialty pharmacy service for management and education of her RA.  She has been on therapy with Otrexup for since 9/2019.   I have reviewed her electronic medical record and current medication list and determined that specialty medication adjustment Is not needed at this time.    Patient has not experienced adverse events.  She Is adherent reporting 0 missed doses since last review.  Adherence has been encouraged with the following mechanism(s): proactive refill calls.  She is meeting goals of therapy and will continue treatment.        3/1/2023 1/30/2023 1/4/2023 12/5/2022 11/4/2022 10/3/2022 7/12/2022   Follow Up Review   # of missed doses 0 0 0 0 0 0 0   New Medications? No No No No No No No   New Conditions? No No No No No No No   New Allergies? No No No No No No No   Med Effective? Fair Good Good Excellent Good Good Good   Urgent Care? No No No No No No No   Requested Pharmacist? No No No No No No No            Therapy is appropriate to continue.    Therapy is effective: Yes  On scale of 1 to 10, how does patient rank quality of life? (10 - Best): 8  Recommendations: none at this time.  Review Method: Patient Contact    Tasks added this encounter   12/27/2023 - Clinical - Follow Up Assesement (Annual)   Tasks due within next 3 months   3/26/2023 - Refill Call (Auto Added)     Candice Segura, PharmD  [facility]  [address] [facility] [address]  Phone: [phone]  Fax: [phone]

## 2023-03-29 ENCOUNTER — PATIENT MESSAGE (OUTPATIENT)
Dept: RHEUMATOLOGY | Facility: CLINIC | Age: 63
End: 2023-03-29
Payer: COMMERCIAL

## 2023-03-29 DIAGNOSIS — M05.79 RHEUMATOID ARTHRITIS INVOLVING MULTIPLE SITES WITH POSITIVE RHEUMATOID FACTOR: Primary | ICD-10-CM

## 2023-03-29 DIAGNOSIS — Z79.60 LONG-TERM USE OF IMMUNOSUPPRESSANT MEDICATION: ICD-10-CM

## 2023-03-29 NOTE — TELEPHONE ENCOUNTER
Spoke with pt- refill request sent to provider was denied.  Recommended for pt to follow up with provider to clarify if anything specific needs to be done in order to get refill approval.

## 2023-03-30 ENCOUNTER — PATIENT OUTREACH (OUTPATIENT)
Dept: ADMINISTRATIVE | Facility: HOSPITAL | Age: 63
End: 2023-03-30
Payer: COMMERCIAL

## 2023-03-30 ENCOUNTER — PATIENT MESSAGE (OUTPATIENT)
Dept: ADMINISTRATIVE | Facility: HOSPITAL | Age: 63
End: 2023-03-30
Payer: COMMERCIAL

## 2023-04-04 NOTE — TELEPHONE ENCOUNTER
Provider informed pt that repeat labs are needed before Otrexup refill.  Labs have been ordered but not collected yet.

## 2023-04-10 NOTE — TELEPHONE ENCOUNTER
Provider informed pt that repeat labs are needed before Otrexup refill.  Spoke with pt- She has lab apt 4/14/23.  Will follow up after labs completed.

## 2023-04-14 ENCOUNTER — LAB VISIT (OUTPATIENT)
Dept: LAB | Facility: HOSPITAL | Age: 63
End: 2023-04-14
Attending: INTERNAL MEDICINE
Payer: COMMERCIAL

## 2023-04-14 DIAGNOSIS — M05.79 RHEUMATOID ARTHRITIS INVOLVING MULTIPLE SITES WITH POSITIVE RHEUMATOID FACTOR: ICD-10-CM

## 2023-04-14 DIAGNOSIS — Z79.60 LONG-TERM USE OF IMMUNOSUPPRESSANT MEDICATION: ICD-10-CM

## 2023-04-14 LAB
ALBUMIN SERPL BCP-MCNC: 3.6 G/DL (ref 3.5–5.2)
ALP SERPL-CCNC: 128 U/L (ref 55–135)
ALT SERPL W/O P-5'-P-CCNC: 13 U/L (ref 10–44)
ANION GAP SERPL CALC-SCNC: 10 MMOL/L (ref 8–16)
AST SERPL-CCNC: 11 U/L (ref 10–40)
BASOPHILS # BLD AUTO: 0.07 K/UL (ref 0–0.2)
BASOPHILS NFR BLD: 0.7 % (ref 0–1.9)
BILIRUB SERPL-MCNC: 0.2 MG/DL (ref 0.1–1)
BUN SERPL-MCNC: 8 MG/DL (ref 8–23)
CALCIUM SERPL-MCNC: 9 MG/DL (ref 8.7–10.5)
CHLORIDE SERPL-SCNC: 104 MMOL/L (ref 95–110)
CO2 SERPL-SCNC: 26 MMOL/L (ref 23–29)
CREAT SERPL-MCNC: 0.8 MG/DL (ref 0.5–1.4)
CRP SERPL-MCNC: 7.6 MG/L (ref 0–8.2)
DIFFERENTIAL METHOD: ABNORMAL
EOSINOPHIL # BLD AUTO: 0.2 K/UL (ref 0–0.5)
EOSINOPHIL NFR BLD: 2.4 % (ref 0–8)
ERYTHROCYTE [DISTWIDTH] IN BLOOD BY AUTOMATED COUNT: 13.3 % (ref 11.5–14.5)
ERYTHROCYTE [SEDIMENTATION RATE] IN BLOOD BY PHOTOMETRIC METHOD: 78 MM/HR (ref 0–36)
EST. GFR  (NO RACE VARIABLE): >60 ML/MIN/1.73 M^2
GLUCOSE SERPL-MCNC: 114 MG/DL (ref 70–110)
HCT VFR BLD AUTO: 42.4 % (ref 37–48.5)
HGB BLD-MCNC: 13.2 G/DL (ref 12–16)
IMM GRANULOCYTES # BLD AUTO: 0.02 K/UL (ref 0–0.04)
IMM GRANULOCYTES NFR BLD AUTO: 0.2 % (ref 0–0.5)
LYMPHOCYTES # BLD AUTO: 3 K/UL (ref 1–4.8)
LYMPHOCYTES NFR BLD: 29.6 % (ref 18–48)
MCH RBC QN AUTO: 29.3 PG (ref 27–31)
MCHC RBC AUTO-ENTMCNC: 31.1 G/DL (ref 32–36)
MCV RBC AUTO: 94 FL (ref 82–98)
MONOCYTES # BLD AUTO: 0.9 K/UL (ref 0.3–1)
MONOCYTES NFR BLD: 9.3 % (ref 4–15)
NEUTROPHILS # BLD AUTO: 5.8 K/UL (ref 1.8–7.7)
NEUTROPHILS NFR BLD: 57.8 % (ref 38–73)
NRBC BLD-RTO: 0 /100 WBC
PLATELET # BLD AUTO: 333 K/UL (ref 150–450)
PMV BLD AUTO: 10.6 FL (ref 9.2–12.9)
POTASSIUM SERPL-SCNC: 3.8 MMOL/L (ref 3.5–5.1)
PROT SERPL-MCNC: 6.9 G/DL (ref 6–8.4)
RBC # BLD AUTO: 4.5 M/UL (ref 4–5.4)
SODIUM SERPL-SCNC: 140 MMOL/L (ref 136–145)
WBC # BLD AUTO: 10.09 K/UL (ref 3.9–12.7)

## 2023-04-14 PROCEDURE — 85652 RBC SED RATE AUTOMATED: CPT | Performed by: INTERNAL MEDICINE

## 2023-04-14 PROCEDURE — 36415 COLL VENOUS BLD VENIPUNCTURE: CPT | Mod: PO | Performed by: INTERNAL MEDICINE

## 2023-04-14 PROCEDURE — 80053 COMPREHEN METABOLIC PANEL: CPT | Performed by: INTERNAL MEDICINE

## 2023-04-14 PROCEDURE — 85025 COMPLETE CBC W/AUTO DIFF WBC: CPT | Performed by: INTERNAL MEDICINE

## 2023-04-14 PROCEDURE — 86140 C-REACTIVE PROTEIN: CPT | Performed by: INTERNAL MEDICINE

## 2023-04-18 DIAGNOSIS — M05.79 RHEUMATOID ARTHRITIS INVOLVING MULTIPLE SITES WITH POSITIVE RHEUMATOID FACTOR: ICD-10-CM

## 2023-04-18 RX ORDER — METHOTREXATE 25 MG/.4ML
25 INJECTION, SOLUTION SUBCUTANEOUS
Qty: 1.6 ML | Refills: 0 | Status: SHIPPED | OUTPATIENT
Start: 2023-04-18 | End: 2023-05-18

## 2023-04-18 RX ORDER — METHOTREXATE 25 MG/ML
25 INJECTION, SOLUTION INTRA-ARTERIAL; INTRAMUSCULAR; INTRAVENOUS WEEKLY
Qty: 4 ML | Refills: 3 | OUTPATIENT
Start: 2023-04-18

## 2023-04-24 NOTE — TELEPHONE ENCOUNTER
Called pt to set up Otrexup refill.  She declined proceeding with refill at this time.  She does not want a refill and will discuss during apt in June with Rheum MD.  I asked pt if she had any questions/concerns that [facility] can address at this time.  Pt declined.  Pt said she will call [facility] in June after her apt if she elects to resume Otrexup.

## 2023-05-13 ENCOUNTER — LAB VISIT (OUTPATIENT)
Dept: LAB | Facility: HOSPITAL | Age: 63
End: 2023-05-13
Attending: INTERNAL MEDICINE
Payer: COMMERCIAL

## 2023-05-13 DIAGNOSIS — Z00.00 HEALTHCARE MAINTENANCE: ICD-10-CM

## 2023-05-13 DIAGNOSIS — E03.9 ACQUIRED HYPOTHYROIDISM: ICD-10-CM

## 2023-05-13 DIAGNOSIS — E55.9 VITAMIN D DEFICIENCY DISEASE: ICD-10-CM

## 2023-05-13 LAB
25(OH)D3+25(OH)D2 SERPL-MCNC: 53 NG/ML (ref 30–96)
ALBUMIN SERPL BCP-MCNC: 3.6 G/DL (ref 3.5–5.2)
ALP SERPL-CCNC: 125 U/L (ref 55–135)
ALT SERPL W/O P-5'-P-CCNC: 16 U/L (ref 10–44)
ANION GAP SERPL CALC-SCNC: 10 MMOL/L (ref 8–16)
AST SERPL-CCNC: 16 U/L (ref 10–40)
BASOPHILS # BLD AUTO: 0.05 K/UL (ref 0–0.2)
BASOPHILS NFR BLD: 0.6 % (ref 0–1.9)
BILIRUB SERPL-MCNC: 0.5 MG/DL (ref 0.1–1)
BUN SERPL-MCNC: 9 MG/DL (ref 8–23)
CALCIUM SERPL-MCNC: 9.2 MG/DL (ref 8.7–10.5)
CHLORIDE SERPL-SCNC: 106 MMOL/L (ref 95–110)
CHOLEST SERPL-MCNC: 194 MG/DL (ref 120–199)
CHOLEST/HDLC SERPL: 5.4 {RATIO} (ref 2–5)
CO2 SERPL-SCNC: 26 MMOL/L (ref 23–29)
CREAT SERPL-MCNC: 0.8 MG/DL (ref 0.5–1.4)
DIFFERENTIAL METHOD: NORMAL
EOSINOPHIL # BLD AUTO: 0.2 K/UL (ref 0–0.5)
EOSINOPHIL NFR BLD: 2.5 % (ref 0–8)
ERYTHROCYTE [DISTWIDTH] IN BLOOD BY AUTOMATED COUNT: 12.9 % (ref 11.5–14.5)
EST. GFR  (NO RACE VARIABLE): >60 ML/MIN/1.73 M^2
ESTIMATED AVG GLUCOSE: 105 MG/DL (ref 68–131)
GLUCOSE SERPL-MCNC: 81 MG/DL (ref 70–110)
HBA1C MFR BLD: 5.3 % (ref 4–5.6)
HCT VFR BLD AUTO: 42.8 % (ref 37–48.5)
HDLC SERPL-MCNC: 36 MG/DL (ref 40–75)
HDLC SERPL: 18.6 % (ref 20–50)
HGB BLD-MCNC: 13.8 G/DL (ref 12–16)
IMM GRANULOCYTES # BLD AUTO: 0.02 K/UL (ref 0–0.04)
IMM GRANULOCYTES NFR BLD AUTO: 0.2 % (ref 0–0.5)
LDLC SERPL CALC-MCNC: 134.8 MG/DL (ref 63–159)
LYMPHOCYTES # BLD AUTO: 2.9 K/UL (ref 1–4.8)
LYMPHOCYTES NFR BLD: 35 % (ref 18–48)
MCH RBC QN AUTO: 29.4 PG (ref 27–31)
MCHC RBC AUTO-ENTMCNC: 32.2 G/DL (ref 32–36)
MCV RBC AUTO: 91 FL (ref 82–98)
MONOCYTES # BLD AUTO: 0.7 K/UL (ref 0.3–1)
MONOCYTES NFR BLD: 7.8 % (ref 4–15)
NEUTROPHILS # BLD AUTO: 4.5 K/UL (ref 1.8–7.7)
NEUTROPHILS NFR BLD: 53.9 % (ref 38–73)
NONHDLC SERPL-MCNC: 158 MG/DL
NRBC BLD-RTO: 0 /100 WBC
PLATELET # BLD AUTO: 317 K/UL (ref 150–450)
PMV BLD AUTO: 10.6 FL (ref 9.2–12.9)
POTASSIUM SERPL-SCNC: 4.1 MMOL/L (ref 3.5–5.1)
PROT SERPL-MCNC: 6.8 G/DL (ref 6–8.4)
RBC # BLD AUTO: 4.69 M/UL (ref 4–5.4)
SODIUM SERPL-SCNC: 142 MMOL/L (ref 136–145)
TRIGL SERPL-MCNC: 116 MG/DL (ref 30–150)
TSH SERPL DL<=0.005 MIU/L-ACNC: 0.65 UIU/ML (ref 0.4–4)
WBC # BLD AUTO: 8.29 K/UL (ref 3.9–12.7)

## 2023-05-13 PROCEDURE — 36415 COLL VENOUS BLD VENIPUNCTURE: CPT | Mod: PO | Performed by: INTERNAL MEDICINE

## 2023-05-13 PROCEDURE — 80053 COMPREHEN METABOLIC PANEL: CPT | Performed by: INTERNAL MEDICINE

## 2023-05-13 PROCEDURE — 83036 HEMOGLOBIN GLYCOSYLATED A1C: CPT | Performed by: INTERNAL MEDICINE

## 2023-05-13 PROCEDURE — 84443 ASSAY THYROID STIM HORMONE: CPT | Performed by: INTERNAL MEDICINE

## 2023-05-13 PROCEDURE — 85025 COMPLETE CBC W/AUTO DIFF WBC: CPT | Performed by: INTERNAL MEDICINE

## 2023-05-13 PROCEDURE — 82306 VITAMIN D 25 HYDROXY: CPT | Performed by: INTERNAL MEDICINE

## 2023-05-13 PROCEDURE — 80061 LIPID PANEL: CPT | Performed by: INTERNAL MEDICINE

## 2023-05-15 ENCOUNTER — PATIENT MESSAGE (OUTPATIENT)
Dept: RHEUMATOLOGY | Facility: CLINIC | Age: 63
End: 2023-05-15
Payer: COMMERCIAL

## 2023-05-15 ENCOUNTER — TELEPHONE (OUTPATIENT)
Dept: RHEUMATOLOGY | Facility: CLINIC | Age: 63
End: 2023-05-15
Payer: COMMERCIAL

## 2023-05-16 ENCOUNTER — OFFICE VISIT (OUTPATIENT)
Dept: FAMILY MEDICINE | Facility: CLINIC | Age: 63
End: 2023-05-16
Payer: COMMERCIAL

## 2023-05-16 VITALS
BODY MASS INDEX: 26.82 KG/M2 | TEMPERATURE: 98 F | HEIGHT: 62 IN | HEART RATE: 78 BPM | RESPIRATION RATE: 16 BRPM | DIASTOLIC BLOOD PRESSURE: 80 MMHG | SYSTOLIC BLOOD PRESSURE: 110 MMHG | WEIGHT: 145.75 LBS | OXYGEN SATURATION: 96 %

## 2023-05-16 DIAGNOSIS — M85.80 OSTEOPENIA, UNSPECIFIED LOCATION: ICD-10-CM

## 2023-05-16 DIAGNOSIS — Z00.00 ANNUAL PHYSICAL EXAM: Primary | ICD-10-CM

## 2023-05-16 DIAGNOSIS — M15.9 PRIMARY OSTEOARTHRITIS INVOLVING MULTIPLE JOINTS: ICD-10-CM

## 2023-05-16 DIAGNOSIS — E03.9 ACQUIRED HYPOTHYROIDISM: ICD-10-CM

## 2023-05-16 DIAGNOSIS — E55.9 VITAMIN D DEFICIENCY DISEASE: ICD-10-CM

## 2023-05-16 DIAGNOSIS — E55.9 VITAMIN D DEFICIENCY: ICD-10-CM

## 2023-05-16 DIAGNOSIS — E78.5 HYPERLIPIDEMIA, UNSPECIFIED HYPERLIPIDEMIA TYPE: ICD-10-CM

## 2023-05-16 DIAGNOSIS — E03.9 HYPOTHYROIDISM, UNSPECIFIED TYPE: ICD-10-CM

## 2023-05-16 DIAGNOSIS — D84.9 IMMUNOSUPPRESSION: ICD-10-CM

## 2023-05-16 DIAGNOSIS — Z12.39 ENCOUNTER FOR SCREENING FOR MALIGNANT NEOPLASM OF BREAST, UNSPECIFIED SCREENING MODALITY: ICD-10-CM

## 2023-05-16 DIAGNOSIS — M05.79 RHEUMATOID ARTHRITIS INVOLVING MULTIPLE SITES WITH POSITIVE RHEUMATOID FACTOR: ICD-10-CM

## 2023-05-16 PROCEDURE — 1159F PR MEDICATION LIST DOCUMENTED IN MEDICAL RECORD: ICD-10-PCS | Mod: CPTII,S$GLB,, | Performed by: INTERNAL MEDICINE

## 2023-05-16 PROCEDURE — 1160F PR REVIEW ALL MEDS BY PRESCRIBER/CLIN PHARMACIST DOCUMENTED: ICD-10-PCS | Mod: CPTII,S$GLB,, | Performed by: INTERNAL MEDICINE

## 2023-05-16 PROCEDURE — 99396 PREV VISIT EST AGE 40-64: CPT | Mod: S$GLB,,, | Performed by: INTERNAL MEDICINE

## 2023-05-16 PROCEDURE — 3008F BODY MASS INDEX DOCD: CPT | Mod: CPTII,S$GLB,, | Performed by: INTERNAL MEDICINE

## 2023-05-16 PROCEDURE — 3074F SYST BP LT 130 MM HG: CPT | Mod: CPTII,S$GLB,, | Performed by: INTERNAL MEDICINE

## 2023-05-16 PROCEDURE — 3079F DIAST BP 80-89 MM HG: CPT | Mod: CPTII,S$GLB,, | Performed by: INTERNAL MEDICINE

## 2023-05-16 PROCEDURE — 1159F MED LIST DOCD IN RCRD: CPT | Mod: CPTII,S$GLB,, | Performed by: INTERNAL MEDICINE

## 2023-05-16 PROCEDURE — 3008F PR BODY MASS INDEX (BMI) DOCUMENTED: ICD-10-PCS | Mod: CPTII,S$GLB,, | Performed by: INTERNAL MEDICINE

## 2023-05-16 PROCEDURE — 3074F PR MOST RECENT SYSTOLIC BLOOD PRESSURE < 130 MM HG: ICD-10-PCS | Mod: CPTII,S$GLB,, | Performed by: INTERNAL MEDICINE

## 2023-05-16 PROCEDURE — 99999 PR PBB SHADOW E&M-EST. PATIENT-LVL V: CPT | Mod: PBBFAC,,, | Performed by: INTERNAL MEDICINE

## 2023-05-16 PROCEDURE — 1160F RVW MEDS BY RX/DR IN RCRD: CPT | Mod: CPTII,S$GLB,, | Performed by: INTERNAL MEDICINE

## 2023-05-16 PROCEDURE — 99396 PR PREVENTIVE VISIT,EST,40-64: ICD-10-PCS | Mod: S$GLB,,, | Performed by: INTERNAL MEDICINE

## 2023-05-16 PROCEDURE — 3044F HG A1C LEVEL LT 7.0%: CPT | Mod: CPTII,S$GLB,, | Performed by: INTERNAL MEDICINE

## 2023-05-16 PROCEDURE — 3044F PR MOST RECENT HEMOGLOBIN A1C LEVEL <7.0%: ICD-10-PCS | Mod: CPTII,S$GLB,, | Performed by: INTERNAL MEDICINE

## 2023-05-16 PROCEDURE — 99999 PR PBB SHADOW E&M-EST. PATIENT-LVL V: ICD-10-PCS | Mod: PBBFAC,,, | Performed by: INTERNAL MEDICINE

## 2023-05-16 PROCEDURE — 3079F PR MOST RECENT DIASTOLIC BLOOD PRESSURE 80-89 MM HG: ICD-10-PCS | Mod: CPTII,S$GLB,, | Performed by: INTERNAL MEDICINE

## 2023-05-16 RX ORDER — ALENDRONATE SODIUM 70 MG/1
70 TABLET ORAL
Qty: 4 TABLET | Refills: 11 | Status: SHIPPED | OUTPATIENT
Start: 2023-05-16 | End: 2024-05-15

## 2023-05-16 RX ORDER — LEVOTHYROXINE SODIUM 100 UG/1
100 TABLET ORAL DAILY
Qty: 30 TABLET | Refills: 11 | Status: SHIPPED | OUTPATIENT
Start: 2023-05-16 | End: 2024-05-15

## 2023-05-16 RX ORDER — CHOLECALCIFEROL (VITAMIN D3) 50 MCG
1 TABLET ORAL DAILY
Qty: 90 TABLET | Refills: 1 | Status: SHIPPED | OUTPATIENT
Start: 2023-05-16

## 2023-05-16 RX ORDER — TIZANIDINE 2 MG/1
2 TABLET ORAL EVERY 8 HOURS PRN
Qty: 30 TABLET | Refills: 1 | Status: SHIPPED | OUTPATIENT
Start: 2023-05-16 | End: 2023-06-05

## 2023-05-16 NOTE — PROGRESS NOTES
"HISTORY OF PRESENT ILLNESS:  Darlyn Donato is a 63 y.o. female who presents to the clinic today for Annual Exam    Last seen by me 5/2022.    Macular degeneration - Seeing Retina Specialist Dr. Irving Wolf for macular degeneration and receiving tx.    Osteopenia - On alendronate.    RA - Managed with MTX and folic acid but notes she stopped these for 6 weeks.  Followed by Dr. Hernandez - next visit 6/12/23.  No joint pain or swelling today.    The 10-year ASCVD risk score (Robbi GOLDSTEIN, et al., 2019) is: 3.5%    Values used to calculate the score:      Age: 63 years      Sex: Female      Is Non- : No      Diabetic: No      Tobacco smoker: No      Systolic Blood Pressure: 110 mmHg      Is BP treated: No      HDL Cholesterol: 36 mg/dL      Total Cholesterol: 194 mg/dL    PAST MEDICAL HISTORY:  Past Medical History:   Diagnosis Date    Arthritis     Depression     Early dry stage nonexudative age-related macular degeneration of both eyes 8/19/2019    Elevated alkaline phosphatase level     Hyperlipidemia     Nuclear sclerosis of both eyes 8/19/2019    Osteopenia     Personal history of female infertility     Respiratory distress     " stopped Breathing" after 2 pain meds were given in a close time frame to one another.    Thyroid disease     multinodular goiter    Vitamin D deficiency disease        PAST SURGICAL HISTORY:  Past Surgical History:   Procedure Laterality Date    BREAST BIOPSY Left     over 10 yrs ago    BREAST SURGERY      benign biopsy on left    COLONOSCOPY N/A 6/7/2019    Procedure: COLONOSCOPY;  Surgeon: Julian Waldrop MD;  Location: [facility] ([address] [place] [address]);  Service: Endoscopy;  Laterality: N/A;  2nd floor - per anethesia note from Pt hysterectomy 2013, Pt possible difficult intubation - ERW    COLONOSCOPY N/A 11/1/2021    Procedure: COLONOSCOPY;  Surgeon: Brody Sanchez MD;  Location: [facility] ENDO;  Service: Endoscopy;  Laterality: N/A;  pt completed COVID vaccine- see Immunization " record in chart-rb  pt reports n/v with anesthesia  covid test 10/1 [place], prep instr portal -ml  8/10 LVM and portal of new arrival time-ml  9/14 r/s again due to 10/4 date no OutPts scheduling; Pt wants Early Mondays ONLY  covid elda    HYSTERECTOMY      LAPAROSCOPY W/ MINI-LAPAROTOMY      for fertility issues    OOPHORECTOMY      MA REMOVAL OF OVARY/TUBE(S)      RECTAL SURGERY      excess skin growth removal    supracervical hysterectomy      TONSILLECTOMY         SOCIAL HISTORY:  Social History     Socioeconomic History    Marital status:     Number of children: 0    Years of education: some colle   Occupational History    Occupation:  law firm     Employer: [employer]   Tobacco Use    Smoking status: Former     Packs/day: 2.50     Years: 30.00     Pack years: 75.00     Types: Cigarettes    Smokeless tobacco: Never   Substance and Sexual Activity    Alcohol use: No     Comment: Rare    Drug use: No    Sexual activity: Not Currently     Partners: Male   Social History Narrative    Adult Screenings    Mammogram( for females) done 4/2011    Pap ( for females)? Needs referral to  GYN    Colonoscopy age  52-Not done yet    Flu shot yearly to be done today  1/9/13    Td ?    Pneumovax recommended one time  at age  65    Zostavax recommended one time at  age  62    Eye exam recommended yearly- not done yet     Bone density April 2011-osteopenia       FAMILY HISTORY:  Family History   Problem Relation Age of Onset    Breast cancer Mother     Hyperlipidemia Mother     Macular degeneration Mother     Emphysema Father     No Known Problems Sister     No Known Problems Brother     Macular degeneration Maternal Aunt     No Known Problems Maternal Uncle     No Known Problems Paternal Aunt     No Known Problems Paternal Uncle     No Known Problems Maternal Grandmother     No Known Problems Maternal Grandfather     No Known Problems Paternal Grandmother     No Known Problems Paternal Grandfather      Colon cancer Neg Hx     Ovarian cancer Neg Hx     Melanoma Neg Hx     Amblyopia Neg Hx     Blindness Neg Hx     Cancer Neg Hx     Cataracts Neg Hx     Diabetes Neg Hx     Hypertension Neg Hx     Retinal detachment Neg Hx     Strabismus Neg Hx     Stroke Neg Hx     Thyroid disease Neg Hx     Glaucoma Neg Hx        ALLERGIES AND MEDICATIONS: updated and reviewed.  Review of patient's allergies indicates:   Allergen Reactions    Hydromorphone Shortness Of Breath    Percocet  [oxycodone-acetaminophen] Itching and Nausea Only    Vicodin  [hydrocodone-acetaminophen] Nausea Only and Nausea And Vomiting     Medication List with Changes/Refills   Current Medications    ALBUTEROL (PROAIR HFA) 90 MCG/ACTUATION INHALER    Inhale 2 puffs into the lungs every 4 (four) hours as needed. 2 HFA Aerosol Inhaler Inhalation Every 4-6 hours    ALENDRONATE (FOSAMAX) 70 MG TABLET    Take 1 tablet (70 mg total) by mouth every 7 days.    CHOLECALCIFEROL, VITAMIN D3, (VITAMIN D3) 50 MCG (2,000 UNIT) TAB    Take 1 tablet (2,000 Units total) by mouth once daily.    DICLOFENAC SODIUM (VOLTAREN) 1 % GEL    Apply 2 g topically 3 (three) times daily.    FOLIC ACID (FOLVITE) 1 MG TABLET    TAKE 1 TABLET(1 MG) BY MOUTH EVERY DAY    LEVOTHYROXINE (SYNTHROID) 100 MCG TABLET    Take 1 tablet (100 mcg total) by mouth once daily. Fill brand name    METHOTREXATE, PF, (OTREXUP, PF,) 25 MG/0.4 ML ATIN    Inject 0.4 mLs (25 mg total) into the skin every 7 days.    MULTIVIT-MIN/FERROUS GLUCONATE (MULTI-DONNIE ORAL)    Take by mouth.    MUPIROCIN (BACTROBAN) 2 % OINTMENT    Apply to affected area 3 times daily          CARE TEAM:  Patient Care Team:  Amando Prajapati MD as PCP - General (Internal Medicine)  Poncho Rasmussen MA as Care Coordinator         REVIEW OF SYSTEMS:  Review of Systems   Constitutional:  Negative for chills and fever.   HENT:  Negative for congestion and postnasal drip.    Eyes:  Negative for photophobia and visual disturbance.    Respiratory:  Negative for cough and shortness of breath.    Cardiovascular:  Negative for chest pain and palpitations.   Gastrointestinal:  Negative for nausea and vomiting.   Genitourinary:  Negative for dysuria and frequency.   Musculoskeletal:  Negative for arthralgias and back pain.   Neurological:  Negative for light-headedness and headaches.   Psychiatric/Behavioral:  Negative for sleep disturbance. The patient is not nervous/anxious.        PHYSICAL EXAM:   Vitals:    05/16/23 0848   BP: 110/80   Pulse: 78   Resp: 16   Temp: 98 °F (36.7 °C)             Body mass index is 26.65 kg/m².     General appearance - alert, well appearing, and in no distress and oriented to person, place, and time  Mental status - normal mood, behavior, speech, dress, motor activity, and thought processes  Eyes - sclera anicteric, left eye normal, right eye normal  Chest - clear to auscultation, no wheezes, rales or rhonchi, symmetric air entry, no tachypnea, retractions or cyanosis  Heart - normal rate, regular rhythm, normal S1, S2, no murmurs, rubs, clicks or gallops  Neurological - alert, oriented, normal speech, no focal findings or movement disorder noted  Extremities - peripheral pulses normal, no pedal edema, no clubbing or cyanosis    The 10-year ASCVD risk score (Robbi DK, et al., 2019) is: 3.5%    Values used to calculate the score:      Age: 63 years      Sex: Female      Is Non- : No      Diabetic: No      Tobacco smoker: No      Systolic Blood Pressure: 110 mmHg      Is BP treated: No      HDL Cholesterol: 36 mg/dL      Total Cholesterol: 194 mg/dL    ASSESSMENT AND PLAN:  Annual physical exam        - Recent results and healthy lifestyle measures were discussed with her.    Hypothyroidism, unspecified type  -     levothyroxine (SYNTHROID) 100 MCG tablet; Take 1 tablet (100 mcg total) by mouth once daily. Fill brand name  Dispense: 30 tablet; Refill: 11    Hyperlipidemia, unspecified  hyperlipidemia type        - Continue healthy lifestyle measures for management.  No statin prescribed.    Vitamin D deficiency disease  Vitamin D deficiency  -     cholecalciferol, vitamin D3, (VITAMIN D3) 50 mcg (2,000 unit) Tab; Take 1 tablet (2,000 Units total) by mouth once daily.  Dispense: 90 tablet; Refill: 1    Acquired hypothyroidism        - Stable on current medication    Osteopenia, unspecified location  -     DXA Bone Density Axial Skeleton 1 or more sites; Future; Expected date: 05/16/2023  -     alendronate (FOSAMAX) 70 MG tablet; Take 1 tablet (70 mg total) by mouth every 7 days.  Dispense: 4 tablet; Refill: 11    Encounter for screening for malignant neoplasm of breast, unspecified screening modality  -     Mammo Digital Diagnostic Bilat with Eliel; Future; Expected date: 11/27/2023    Primary osteoarthritis involving multiple joints  Rheumatoid arthritis involving multiple sites with positive rheumatoid factor  Immunosuppression  -     tiZANidine (ZANAFLEX) 2 MG tablet; Take 1 tablet (2 mg total) by mouth every 8 (eight) hours as needed (muscle spasm).  Dispense: 30 tablet; Refill: 1  - Advised for follow up with rheumatology.                  Follow up one year or sooner as needed.

## 2023-06-12 ENCOUNTER — OFFICE VISIT (OUTPATIENT)
Dept: RHEUMATOLOGY | Facility: CLINIC | Age: 63
End: 2023-06-12
Payer: COMMERCIAL

## 2023-06-12 ENCOUNTER — HOSPITAL ENCOUNTER (OUTPATIENT)
Dept: RADIOLOGY | Facility: HOSPITAL | Age: 63
Discharge: HOME | End: 2023-06-12
Attending: INTERNAL MEDICINE
Payer: COMMERCIAL

## 2023-06-12 VITALS
DIASTOLIC BLOOD PRESSURE: 79 MMHG | HEART RATE: 82 BPM | SYSTOLIC BLOOD PRESSURE: 120 MMHG | BODY MASS INDEX: 28.2 KG/M2 | WEIGHT: 153.25 LBS | HEIGHT: 62 IN

## 2023-06-12 DIAGNOSIS — M05.79 RHEUMATOID ARTHRITIS INVOLVING MULTIPLE SITES WITH POSITIVE RHEUMATOID FACTOR: Primary | ICD-10-CM

## 2023-06-12 DIAGNOSIS — M15.9 PRIMARY OSTEOARTHRITIS INVOLVING MULTIPLE JOINTS: ICD-10-CM

## 2023-06-12 DIAGNOSIS — M05.79 RHEUMATOID ARTHRITIS INVOLVING MULTIPLE SITES WITH POSITIVE RHEUMATOID FACTOR: ICD-10-CM

## 2023-06-12 PROCEDURE — 3044F PR MOST RECENT HEMOGLOBIN A1C LEVEL <7.0%: ICD-10-PCS | Mod: CPTII,S$GLB,, | Performed by: INTERNAL MEDICINE

## 2023-06-12 PROCEDURE — 99999 PR PBB SHADOW E&M-EST. PATIENT-LVL IV: CPT | Mod: PBBFAC,,, | Performed by: INTERNAL MEDICINE

## 2023-06-12 PROCEDURE — 99214 OFFICE O/P EST MOD 30 MIN: CPT | Mod: S$GLB,,, | Performed by: INTERNAL MEDICINE

## 2023-06-12 PROCEDURE — 73630 X-RAY EXAM OF FOOT: CPT | Mod: 26,,, | Performed by: RADIOLOGY

## 2023-06-12 PROCEDURE — 1159F PR MEDICATION LIST DOCUMENTED IN MEDICAL RECORD: ICD-10-PCS | Mod: CPTII,S$GLB,, | Performed by: INTERNAL MEDICINE

## 2023-06-12 PROCEDURE — 3044F HG A1C LEVEL LT 7.0%: CPT | Mod: CPTII,S$GLB,, | Performed by: INTERNAL MEDICINE

## 2023-06-12 PROCEDURE — 73630 XR FOOT COMPLETE 3 VIEW BILATERAL: ICD-10-PCS | Mod: 26,,, | Performed by: RADIOLOGY

## 2023-06-12 PROCEDURE — 73130 X-RAY EXAM OF HAND: CPT | Mod: 26,,, | Performed by: RADIOLOGY

## 2023-06-12 PROCEDURE — 1159F MED LIST DOCD IN RCRD: CPT | Mod: CPTII,S$GLB,, | Performed by: INTERNAL MEDICINE

## 2023-06-12 PROCEDURE — 73130 XR HAND COMPLETE 3 VIEWS BILATERAL: ICD-10-PCS | Mod: 26,,, | Performed by: RADIOLOGY

## 2023-06-12 PROCEDURE — 1160F PR REVIEW ALL MEDS BY PRESCRIBER/CLIN PHARMACIST DOCUMENTED: ICD-10-PCS | Mod: CPTII,S$GLB,, | Performed by: INTERNAL MEDICINE

## 2023-06-12 PROCEDURE — 99999 PR PBB SHADOW E&M-EST. PATIENT-LVL IV: ICD-10-PCS | Mod: PBBFAC,,, | Performed by: INTERNAL MEDICINE

## 2023-06-12 PROCEDURE — 99214 PR OFFICE/OUTPT VISIT, EST, LEVL IV, 30-39 MIN: ICD-10-PCS | Mod: S$GLB,,, | Performed by: INTERNAL MEDICINE

## 2023-06-12 PROCEDURE — 3008F BODY MASS INDEX DOCD: CPT | Mod: CPTII,S$GLB,, | Performed by: INTERNAL MEDICINE

## 2023-06-12 PROCEDURE — 1160F RVW MEDS BY RX/DR IN RCRD: CPT | Mod: CPTII,S$GLB,, | Performed by: INTERNAL MEDICINE

## 2023-06-12 PROCEDURE — 3078F PR MOST RECENT DIASTOLIC BLOOD PRESSURE < 80 MM HG: ICD-10-PCS | Mod: CPTII,S$GLB,, | Performed by: INTERNAL MEDICINE

## 2023-06-12 PROCEDURE — 3074F PR MOST RECENT SYSTOLIC BLOOD PRESSURE < 130 MM HG: ICD-10-PCS | Mod: CPTII,S$GLB,, | Performed by: INTERNAL MEDICINE

## 2023-06-12 PROCEDURE — 3078F DIAST BP <80 MM HG: CPT | Mod: CPTII,S$GLB,, | Performed by: INTERNAL MEDICINE

## 2023-06-12 PROCEDURE — 73630 X-RAY EXAM OF FOOT: CPT | Mod: TC,50

## 2023-06-12 PROCEDURE — 3008F PR BODY MASS INDEX (BMI) DOCUMENTED: ICD-10-PCS | Mod: CPTII,S$GLB,, | Performed by: INTERNAL MEDICINE

## 2023-06-12 PROCEDURE — 3074F SYST BP LT 130 MM HG: CPT | Mod: CPTII,S$GLB,, | Performed by: INTERNAL MEDICINE

## 2023-06-12 PROCEDURE — 73130 X-RAY EXAM OF HAND: CPT | Mod: TC,50

## 2023-06-14 NOTE — PROGRESS NOTES
History of present illness:  63-year-old female with chronic low back pain.  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  I diagnosed her as having rheumatoid arthritis.  I placed her on methotrexate 15 mg weekly.  She was followed by Dr. Knapp during my absence.  She was changed to injectable methotrexate and is on 25 mg weekly.  She was also placed on sulfasalazine but she did not tolerate it.  She has macular degeneration so she was not considered a candidate for Plaquenil.  She also had a short course of prednisone.    She was last seen 1 year ago.  She ran out of her methotrexate in March.  She was having trouble getting the prescription refilled.  When she did get a refill she did not restart it.  She is noticed no difference in the arthritis with being off the methotrexate.  She is had no joint pain or swelling.  She is had no morning stiffness.  She denies other recent medical problems.      Physical examination:  Musculoskeletal:  She has full range of motion of all joints.  She has no synovitis.  She has no arthritic deformities.    Assessment:  No evidence of active rheumatoid arthritis     Plans:  1.  I have ordered laboratory studies and x-rays.  2.  If these are normal, I will not restart the methotrexate at this time  3.  If she has a flare she is to restart the methotrexate  4. I will otherwise see her in 6 months.

## 2023-10-10 ENCOUNTER — HOSPITAL ENCOUNTER (OUTPATIENT)
Dept: RADIOLOGY | Facility: CLINIC | Age: 63
Discharge: HOME | End: 2023-10-10
Attending: INTERNAL MEDICINE
Payer: COMMERCIAL

## 2023-10-10 DIAGNOSIS — M85.80 OSTEOPENIA, UNSPECIFIED LOCATION: ICD-10-CM

## 2023-10-10 PROCEDURE — 77080 DXA BONE DENSITY AXIAL SKELETON 1 OR MORE SITES: ICD-10-PCS | Mod: 26,,, | Performed by: INTERNAL MEDICINE

## 2023-10-10 PROCEDURE — 77080 DXA BONE DENSITY AXIAL: CPT | Mod: 26,,, | Performed by: INTERNAL MEDICINE

## 2023-10-10 PROCEDURE — 77080 DXA BONE DENSITY AXIAL: CPT | Mod: TC,PO

## 2023-10-23 ENCOUNTER — HOSPITAL ENCOUNTER (OUTPATIENT)
Dept: RADIOLOGY | Facility: HOSPITAL | Age: 63
Discharge: HOME | End: 2023-10-23
Attending: INTERNAL MEDICINE
Payer: COMMERCIAL

## 2023-10-23 DIAGNOSIS — Z12.39 ENCOUNTER FOR SCREENING FOR MALIGNANT NEOPLASM OF BREAST, UNSPECIFIED SCREENING MODALITY: ICD-10-CM

## 2023-12-05 ENCOUNTER — HOSPITAL ENCOUNTER (OUTPATIENT)
Dept: RADIOLOGY | Facility: HOSPITAL | Age: 63
Discharge: HOME | End: 2023-12-05
Attending: INTERNAL MEDICINE
Payer: COMMERCIAL

## 2023-12-05 PROCEDURE — 77063 BREAST TOMOSYNTHESIS BI: CPT | Mod: 26,,, | Performed by: RADIOLOGY

## 2023-12-05 PROCEDURE — 77063 MAMMO DIGITAL SCREENING BILAT WITH TOMO: ICD-10-PCS | Mod: 26,,, | Performed by: RADIOLOGY

## 2023-12-05 PROCEDURE — 77067 MAMMO DIGITAL SCREENING BILAT WITH TOMO: ICD-10-PCS | Mod: 26,,, | Performed by: RADIOLOGY

## 2023-12-05 PROCEDURE — 77067 SCR MAMMO BI INCL CAD: CPT | Mod: TC,PO

## 2023-12-05 PROCEDURE — 77067 SCR MAMMO BI INCL CAD: CPT | Mod: 26,,, | Performed by: RADIOLOGY

## 2024-01-24 NOTE — TELEPHONE ENCOUNTER
Refill Routing Note   Medication(s) are not appropriate for processing by [facility] for the following reason(s):        No active prescription written by provider    [facility] action(s):  Defer     Requires labs : Yes             Appointments  past 12m or future 3m with PCP    Date Provider   Last Visit   5/16/2023 Amando Prajapati MD   Next Visit   Visit date not found Amando Prajapati MD   ED visits in past 90 days: 0        Note composed:2:06 PM 01/24/2024

## 2024-01-24 NOTE — TELEPHONE ENCOUNTER
Care Due:                  Date            Visit Type   Department     Provider  --------------------------------------------------------------------------------                                EP [facility]                              PRIMARY      MEDICINE/  Last Visit: 05-      CARE ([facility])   INTERNAL MED   Amando Prajapati  Next Visit: None Scheduled  None         None Found                                                            Last  Test          Frequency    Reason                     Performed    Due Date  --------------------------------------------------------------------------------    Mg Level....  12 months..  alendronate..............  Not Found    Overdue    Phosphate...  12 months..  alendronate..............  Not Found    Overdue    [employer] Embedded Care Due Messages. Reference number: [id number].   1/24/2024 5:00:52 AM CST

## 2024-01-26 ENCOUNTER — PATIENT MESSAGE (OUTPATIENT)
Dept: FAMILY MEDICINE | Facility: CLINIC | Age: 64
End: 2024-01-26
Payer: COMMERCIAL

## 2024-01-29 RX ORDER — ALBUTEROL SULFATE 90 UG/1
2 AEROSOL, METERED RESPIRATORY (INHALATION) EVERY 4 HOURS PRN
Qty: 54 G | Refills: 1 | Status: SHIPPED | OUTPATIENT
Start: 2024-01-29 | End: 2024-04-22 | Stop reason: SDUPTHER

## 2024-01-29 NOTE — TELEPHONE ENCOUNTER
Refill Decision Note   Darlyn Donato  is requesting a refill authorization.  Brief Assessment and Rationale for Refill:  Approve     Medication Therapy Plan:         Comments:     Note composed:9:55 AM 01/29/2024

## 2024-01-29 NOTE — TELEPHONE ENCOUNTER
No care due was identified.  [facility] Embedded Care Due Messages. Reference number: [id number].   1/29/2024 8:48:42 AM CST

## 2024-01-31 RX ORDER — ALBUTEROL SULFATE 90 UG/1
2 AEROSOL, METERED RESPIRATORY (INHALATION) EVERY 4 HOURS PRN
Qty: 54 G | Refills: 1 | Status: SHIPPED | OUTPATIENT
Start: 2024-01-31 | End: 2024-04-22

## 2024-02-12 ENCOUNTER — OFFICE VISIT (OUTPATIENT)
Dept: URGENT CARE | Facility: CLINIC | Age: 64
End: 2024-02-12
Payer: COMMERCIAL

## 2024-02-12 VITALS
BODY MASS INDEX: 28.16 KG/M2 | WEIGHT: 153 LBS | TEMPERATURE: 98 F | HEIGHT: 62 IN | DIASTOLIC BLOOD PRESSURE: 85 MMHG | HEART RATE: 76 BPM | SYSTOLIC BLOOD PRESSURE: 122 MMHG | RESPIRATION RATE: 18 BRPM | OXYGEN SATURATION: 95 %

## 2024-02-12 DIAGNOSIS — R05.8 COUGH PRESENT FOR GREATER THAN 3 WEEKS: Primary | ICD-10-CM

## 2024-02-12 DIAGNOSIS — R06.2 WHEEZING: ICD-10-CM

## 2024-02-12 LAB
CTP QC/QA: YES
CTP QC/QA: YES
POC MOLECULAR INFLUENZA A AGN: NEGATIVE
POC MOLECULAR INFLUENZA B AGN: NEGATIVE
SARS-COV-2 AG RESP QL IA.RAPID: NEGATIVE

## 2024-02-12 PROCEDURE — 99214 OFFICE O/P EST MOD 30 MIN: CPT | Mod: 25,S$GLB,, | Performed by: NURSE PRACTITIONER

## 2024-02-12 PROCEDURE — 94640 AIRWAY INHALATION TREATMENT: CPT | Mod: S$GLB,,, | Performed by: FAMILY MEDICINE

## 2024-02-12 PROCEDURE — 87502 INFLUENZA DNA AMP PROBE: CPT | Mod: QW,S$GLB,, | Performed by: NURSE PRACTITIONER

## 2024-02-12 PROCEDURE — 96372 THER/PROPH/DIAG INJ SC/IM: CPT | Mod: S$GLB,,, | Performed by: FAMILY MEDICINE

## 2024-02-12 PROCEDURE — 87811 SARS-COV-2 COVID19 W/OPTIC: CPT | Mod: QW,S$GLB,, | Performed by: NURSE PRACTITIONER

## 2024-02-12 PROCEDURE — 71046 X-RAY EXAM CHEST 2 VIEWS: CPT | Mod: S$GLB,,, | Performed by: RADIOLOGY

## 2024-02-12 RX ORDER — IPRATROPIUM BROMIDE 0.5 MG/2.5ML
0.5 SOLUTION RESPIRATORY (INHALATION)
Status: COMPLETED | OUTPATIENT
Start: 2024-02-12 | End: 2024-02-12

## 2024-02-12 RX ORDER — PREDNISONE 20 MG/1
20 TABLET ORAL DAILY
Qty: 5 TABLET | Refills: 0 | Status: SHIPPED | OUTPATIENT
Start: 2024-02-12 | End: 2024-02-12 | Stop reason: ALTCHOICE

## 2024-02-12 RX ORDER — PREDNISONE 10 MG/1
TABLET ORAL
Qty: 15 TABLET | Refills: 0 | Status: SHIPPED | OUTPATIENT
Start: 2024-02-12 | End: 2024-02-19

## 2024-02-12 RX ORDER — AZITHROMYCIN 250 MG/1
TABLET, FILM COATED ORAL
Qty: 6 TABLET | Refills: 0 | Status: SHIPPED | OUTPATIENT
Start: 2024-02-12 | End: 2024-02-17

## 2024-02-12 RX ORDER — BENZONATATE 200 MG/1
200 CAPSULE ORAL 3 TIMES DAILY PRN
Qty: 30 CAPSULE | Refills: 0 | Status: SHIPPED | OUTPATIENT
Start: 2024-02-12

## 2024-02-12 RX ORDER — PROMETHAZINE HYDROCHLORIDE AND DEXTROMETHORPHAN HYDROBROMIDE 6.25; 15 MG/5ML; MG/5ML
5 SYRUP ORAL NIGHTLY PRN
Qty: 118 ML | Refills: 0 | Status: SHIPPED | OUTPATIENT
Start: 2024-02-12

## 2024-02-12 RX ORDER — ALBUTEROL SULFATE 0.83 MG/ML
2.5 SOLUTION RESPIRATORY (INHALATION)
Status: COMPLETED | OUTPATIENT
Start: 2024-02-12 | End: 2024-02-12

## 2024-02-12 RX ORDER — DEXAMETHASONE SODIUM PHOSPHATE 100 MG/10ML
10 INJECTION INTRAMUSCULAR; INTRAVENOUS
Status: COMPLETED | OUTPATIENT
Start: 2024-02-12 | End: 2024-02-12

## 2024-02-12 RX ADMIN — DEXAMETHASONE SODIUM PHOSPHATE 10 MG: 100 INJECTION INTRAMUSCULAR; INTRAVENOUS at 01:02

## 2024-02-12 RX ADMIN — ALBUTEROL SULFATE 2.5 MG: 0.83 SOLUTION RESPIRATORY (INHALATION) at 01:02

## 2024-02-12 RX ADMIN — IPRATROPIUM BROMIDE 0.5 MG: 0.5 SOLUTION RESPIRATORY (INHALATION) at 01:02

## 2024-02-12 NOTE — PROGRESS NOTES
"Subjective:      Patient ID: Darlyn Donato is a 63 y.o. female.    Vitals:  height is 5' 2" (1.575 m) and weight is 69.4 kg (153 lb). Her oral temperature is 98.2 °F (36.8 °C). Her blood pressure is 122/85 and her pulse is 76. Her respiration is 18 and oxygen saturation is 95%.     Chief Complaint: Cough    63-year-old female presents to clinic for evaluation of cough for the past 2 months.  Patient reports a history of bronchitis.  States that she contacted her PCP to refill her inhaler.  She states that inhaler provides temporary relief.  She has also been taking Mucinex and Sudafed with mild relief.  She denies any known exposures.  She denies chest pain.  She does report shortness a breath with activity.  She is awake and alert, answers questions appropriately, no acute distress noted on today's visit.    Cough  This is a new problem. The current episode started more than 1 month ago (2 months). The problem has been unchanged. The problem occurs every few minutes. The cough is Non-productive. Associated symptoms include wheezing. Pertinent negatives include no chest pain, chills or fever. Associated symptoms comments: Chest congestion   . Nothing aggravates the symptoms. She has tried nothing for the symptoms. The treatment provided no relief.       Constitution: Negative for activity change, appetite change, chills, sweating, fatigue and fever.   HENT:  Positive for congestion.    Cardiovascular:  Positive for sob on exertion. Negative for chest pain.   Respiratory:  Positive for cough and wheezing.    Neurological:  Negative for dizziness.      Objective:     Physical Exam   Constitutional: She is oriented to person, place, and time. She appears well-developed.  Non-toxic appearance. She does not appear ill.   HENT:   Head: Normocephalic and atraumatic. Head is without abrasion, without contusion and without laceration.   Ears:   Right Ear: Tympanic membrane and external ear normal.   Left Ear: Tympanic " "membrane and external ear normal.   Nose: Nose normal. No congestion.   Mouth/Throat: Oropharynx is clear and moist and mucous membranes are normal. Mucous membranes are moist. No posterior oropharyngeal erythema. Oropharynx is clear.   Eyes: Conjunctivae, EOM and lids are normal.   Neck: Trachea normal and phonation normal.   Cardiovascular: Normal rate.   Pulmonary/Chest: Effort normal. No stridor. No respiratory distress. She has wheezes.   Lung sounds "tight" bilaterally, faint expiratory wheeze heard in upper lung fields.         Comments: Lung sounds "tight" bilaterally, faint expiratory wheeze heard in upper lung fields.    Abdominal: Normal appearance.   Musculoskeletal: Normal range of motion.         General: Normal range of motion.   Neurological: She is alert and oriented to person, place, and time.   Skin: Skin is warm, dry, intact and not pale. No abrasion, No burn and No ecchymosis   Psychiatric: Her speech is normal and behavior is normal. Mood, judgment and thought content normal.   Nursing note and vitals reviewed.    Results for orders placed or performed in visit on 02/12/24   POCT Influenza A/B MOLECULAR   Result Value Ref Range    POC Molecular Influenza A Ag Negative Negative, Not Reported    POC Molecular Influenza B Ag Negative Negative, Not Reported     Acceptable Yes    SARS Coronavirus 2 Antigen, POCT Manual Read   Result Value Ref Range    SARS Coronavirus 2 Antigen Negative Negative     Acceptable Yes      X-Ray Chest PA And Lateral    Result Date: 2/12/2024  EXAMINATION: XR CHEST PA AND LATERAL CLINICAL HISTORY: Other specified cough FINDINGS: Chest two views: Heart size is normal.  Lungs are clear.  The bones showed DJD.  There is minimal scar.     No acute process seen. Electronically signed by: Hosea Oden MD Date:    02/12/2024 Time:    13:27         Assessment:     1. Cough present for greater than 3 weeks    2. Wheezing        Plan:       Cough " present for greater than 3 weeks  -     POCT Influenza A/B MOLECULAR  -     SARS Coronavirus 2 Antigen, POCT Manual Read  -     X-Ray Chest PA And Lateral; Future; Expected date: 02/12/2024  -     azithromycin (Z-SINGH) 250 MG tablet; Take 2 tablets by mouth on day 1; Take 1 tablet by mouth on days 2-5  Dispense: 6 tablet; Refill: 0  -     promethazine-dextromethorphan (PROMETHAZINE-DM) 6.25-15 mg/5 mL Syrp; Take 5 mLs by mouth nightly as needed (cough).  Dispense: 118 mL; Refill: 0  -     benzonatate (TESSALON) 200 MG capsule; Take 1 capsule (200 mg total) by mouth 3 (three) times daily as needed for Cough.  Dispense: 30 capsule; Refill: 0    Wheezing  -     dexAMETHasone injection 10 mg  -     albuterol nebulizer solution 2.5 mg  -     ipratropium 0.02 % nebulizer solution 0.5 mg  -     predniSONE (DELTASONE) 20 MG tablet; Take 1 tablet (20 mg total) by mouth once daily. for 5 days  Dispense: 5 tablet; Refill: 0      - negative COVID, negative influenza on today's visit.  No acute findings on chest x-ray.  Breath sounds improved after Decadron shot and DuoNeb treatment in clinic.  Patient reports feeling some relief.  Given length of symptoms, will cover for bacterial etiology.  Continue inhaler as needed for shortness a breath or wheezing.  Follow-up with PCP.  Patient verbalized understanding and is in agreement with plan.    Patient Instructions   - Follow up with your PCP or specialty clinic as directed in the next 1-2 weeks if not improved or as needed.  You can call [phone] to schedule an appointment with the appropriate provider.    - Go to the ER or seek medical attention immediately if you develop new or worsening symptoms.    - You must understand that you have received an Urgent Care treatment only and that you may be released before all of your medical problems are known or treated.   - You, the patient, will arrange for follow up care as instructed.   - If your condition worsens or fails to  improve we recommend that you receive another evaluation at the ER immediately or contact your PCP to discuss your concerns or return here.

## 2024-02-12 NOTE — PATIENT INSTRUCTIONS
- Follow up with your PCP or specialty clinic as directed in the next 1-2 weeks if not improved or as needed.  You can call [phone] to schedule an appointment with the appropriate provider.    - Go to the ER or seek medical attention immediately if you develop new or worsening symptoms.    - You must understand that you have received an Urgent Care treatment only and that you may be released before all of your medical problems are known or treated.   - You, the patient, will arrange for follow up care as instructed.   - If your condition worsens or fails to improve we recommend that you receive another evaluation at the ER immediately or contact your PCP to discuss your concerns or return here.

## 2024-03-10 ENCOUNTER — PATIENT MESSAGE (OUTPATIENT)
Dept: FAMILY MEDICINE | Facility: CLINIC | Age: 64
End: 2024-03-10
Payer: COMMERCIAL

## 2024-03-11 NOTE — TELEPHONE ENCOUNTER
Care Due:                  Date            Visit Type   Department     Provider  --------------------------------------------------------------------------------                                EP -         [facility]                              PRIMARY      MEDICINE/  Last Visit: 05-      CARE ([facility])   INTERNAL MED   Amando MCINTYRE      [facility]  MEDICINE/  Next Visit: 04-      [facility] VISIT   INTERNAL MED   Amando Prajapati                                                            Last  Test          Frequency    Reason                     Performed    Due Date  --------------------------------------------------------------------------------    CMP.........  12 months..  alendronate,               05- 05-                             cholecalciferol,.........    TSH.........  12 months..  levothyroxine............  05- 05-    Vitamin D...  12 months..  alendronate,               05- 05-                             cholecalciferol,.........    [employer] Embedded Care Due Messages. Reference number: [id number].   3/11/2024 8:20:57 AM CDT

## 2024-03-12 RX ORDER — CHOLECALCIFEROL (VITAMIN D3) 50 MCG
1 TABLET ORAL DAILY
Qty: 90 TABLET | Refills: 1 | Status: SHIPPED | OUTPATIENT
Start: 2024-03-12

## 2024-03-12 RX ORDER — TIZANIDINE 2 MG/1
4 TABLET ORAL EVERY 6 HOURS PRN
Qty: 30 TABLET | Refills: 1 | Status: SHIPPED | OUTPATIENT
Start: 2024-03-12 | End: 2024-03-22

## 2024-04-22 ENCOUNTER — OFFICE VISIT (OUTPATIENT)
Dept: FAMILY MEDICINE | Facility: CLINIC | Age: 64
End: 2024-04-22
Payer: COMMERCIAL

## 2024-04-22 VITALS
DIASTOLIC BLOOD PRESSURE: 76 MMHG | HEIGHT: 62 IN | OXYGEN SATURATION: 93 % | SYSTOLIC BLOOD PRESSURE: 132 MMHG | HEART RATE: 81 BPM | WEIGHT: 155.19 LBS | TEMPERATURE: 98 F | BODY MASS INDEX: 28.56 KG/M2

## 2024-04-22 DIAGNOSIS — J44.9 CHRONIC OBSTRUCTIVE PULMONARY DISEASE, UNSPECIFIED COPD TYPE: ICD-10-CM

## 2024-04-22 DIAGNOSIS — M85.80 OSTEOPENIA, UNSPECIFIED LOCATION: ICD-10-CM

## 2024-04-22 DIAGNOSIS — Z00.00 HEALTHCARE MAINTENANCE: ICD-10-CM

## 2024-04-22 DIAGNOSIS — M15.9 PRIMARY OSTEOARTHRITIS INVOLVING MULTIPLE JOINTS: ICD-10-CM

## 2024-04-22 DIAGNOSIS — D84.9 IMMUNOSUPPRESSION: ICD-10-CM

## 2024-04-22 DIAGNOSIS — R05.2 SUBACUTE COUGH: Primary | ICD-10-CM

## 2024-04-22 DIAGNOSIS — E03.9 ACQUIRED HYPOTHYROIDISM: ICD-10-CM

## 2024-04-22 DIAGNOSIS — Z79.60 LONG-TERM USE OF IMMUNOSUPPRESSANT MEDICATION: ICD-10-CM

## 2024-04-22 DIAGNOSIS — E78.5 HYPERLIPIDEMIA, UNSPECIFIED HYPERLIPIDEMIA TYPE: ICD-10-CM

## 2024-04-22 DIAGNOSIS — M05.79 RHEUMATOID ARTHRITIS INVOLVING MULTIPLE SITES WITH POSITIVE RHEUMATOID FACTOR: ICD-10-CM

## 2024-04-22 DIAGNOSIS — H35.3131 EARLY DRY STAGE NONEXUDATIVE AGE-RELATED MACULAR DEGENERATION OF BOTH EYES: ICD-10-CM

## 2024-04-22 DIAGNOSIS — E55.9 VITAMIN D DEFICIENCY: ICD-10-CM

## 2024-04-22 DIAGNOSIS — Z87.891 HISTORY OF TOBACCO USE: ICD-10-CM

## 2024-04-22 PROCEDURE — 3008F BODY MASS INDEX DOCD: CPT | Mod: CPTII,S$GLB,, | Performed by: INTERNAL MEDICINE

## 2024-04-22 PROCEDURE — 99999 PR PBB SHADOW E&M-EST. PATIENT-LVL V: CPT | Mod: PBBFAC,,, | Performed by: INTERNAL MEDICINE

## 2024-04-22 PROCEDURE — 1159F MED LIST DOCD IN RCRD: CPT | Mod: CPTII,S$GLB,, | Performed by: INTERNAL MEDICINE

## 2024-04-22 PROCEDURE — 3078F DIAST BP <80 MM HG: CPT | Mod: CPTII,S$GLB,, | Performed by: INTERNAL MEDICINE

## 2024-04-22 PROCEDURE — 99214 OFFICE O/P EST MOD 30 MIN: CPT | Mod: S$GLB,,, | Performed by: INTERNAL MEDICINE

## 2024-04-22 PROCEDURE — 3075F SYST BP GE 130 - 139MM HG: CPT | Mod: CPTII,S$GLB,, | Performed by: INTERNAL MEDICINE

## 2024-04-22 PROCEDURE — 1160F RVW MEDS BY RX/DR IN RCRD: CPT | Mod: CPTII,S$GLB,, | Performed by: INTERNAL MEDICINE

## 2024-04-22 RX ORDER — FLUTICASONE FUROATE AND VILANTEROL 100; 25 UG/1; UG/1
1 POWDER RESPIRATORY (INHALATION) DAILY
Qty: 60 EACH | Refills: 2 | Status: SHIPPED | OUTPATIENT
Start: 2024-04-22

## 2024-04-22 RX ORDER — ALBUTEROL SULFATE 90 UG/1
2 AEROSOL, METERED RESPIRATORY (INHALATION) EVERY 4 HOURS PRN
Qty: 54 G | Refills: 3 | Status: SHIPPED | OUTPATIENT
Start: 2024-04-22

## 2024-04-22 RX ORDER — PREDNISONE 20 MG/1
20 TABLET ORAL 2 TIMES DAILY
Qty: 10 TABLET | Refills: 0 | Status: SHIPPED | OUTPATIENT
Start: 2024-04-22 | End: 2024-04-27

## 2024-04-22 RX ORDER — MONTELUKAST SODIUM 10 MG/1
10 TABLET ORAL NIGHTLY
Qty: 30 TABLET | Refills: 0 | Status: SHIPPED | OUTPATIENT
Start: 2024-04-22 | End: 2024-05-22

## 2024-04-22 NOTE — PROGRESS NOTES
"HISTORY OF PRESENT ILLNESS:  Darlyn Donato is a 64 y.o. female who presents to the clinic today for Cough and Medication Refill    Last seen by me 5/2023.    Cough since Jan/Feb when she had bronchitis.  Using albuterol still - three times daily.  Coughs up clear mucus.    Macular degeneration - Seeing Retina Specialist Dr. Irving Wolf for macular degeneration and receiving tx.     RA - Managed with MTX and folic acid.  Followed by Dr. Hernandez.  Did not tolerate sulfasalazine.  Not candidate for plaquenil due to macular degeneration.    Osteopenia - On Fosamax.  PAST MEDICAL HISTORY:  Past Medical History:   Diagnosis Date    Arthritis     Depression     Early dry stage nonexudative age-related macular degeneration of both eyes 8/19/2019    Elevated alkaline phosphatase level     Hyperlipidemia     Nuclear sclerosis of both eyes 8/19/2019    Osteopenia     Personal history of female infertility     Respiratory distress     " stopped Breathing" after 2 pain meds were given in a close time frame to one another.    Thyroid disease     multinodular goiter    Vitamin D deficiency disease        PAST SURGICAL HISTORY:  Past Surgical History:   Procedure Laterality Date    BREAST BIOPSY Left     over 10 yrs ago    BREAST SURGERY      benign biopsy on left    COLONOSCOPY N/A 6/7/2019    Procedure: COLONOSCOPY;  Surgeon: Julian Waldrop MD;  Location: [facility] ([address] [place] [address]);  Service: Endoscopy;  Laterality: N/A;  2nd floor - per anethesia note from Pt hysterectomy 2013, Pt possible difficult intubation - ERW    COLONOSCOPY N/A 11/1/2021    Procedure: COLONOSCOPY;  Surgeon: Brody Sanchez MD;  Location: [facility];  Service: Endoscopy;  Laterality: N/A;  pt completed COVID vaccine- see Immunization record in chart-rb  pt reports n/v with anesthesia  covid test 10/1 [place], prep instr portal -ml  8/10 LVM and portal of new arrival time-ml  9/14 r/s again due to 10/4 date no OutPts scheduling; Pt wants Early Mondays " ONLY  covid elda    HYSTERECTOMY      LAPAROSCOPY W/ MINI-LAPAROTOMY      for fertility issues    OOPHORECTOMY      MO REMOVAL OF OVARY/TUBE(S)      RECTAL SURGERY      excess skin growth removal    supracervical hysterectomy      TONSILLECTOMY         SOCIAL HISTORY:  Social History     Socioeconomic History    Marital status:     Number of children: 0    Years of education: some colle   Occupational History    Occupation:  law firm     Employer: [employer]   Tobacco Use    Smoking status: Former     Current packs/day: 2.50     Average packs/day: 2.5 packs/day for 30.0 years (75.0 ttl pk-yrs)     Types: Cigarettes    Smokeless tobacco: Never   Substance and Sexual Activity    Alcohol use: No     Comment: Rare    Drug use: No    Sexual activity: Not Currently     Partners: Male   Social History Narrative    Adult Screenings    Mammogram( for females) done 4/2011    Pap ( for females)? Needs referral to  GYN    Colonoscopy age  52-Not done yet    Flu shot yearly to be done today  1/9/13    Td ?    Pneumovax recommended one time  at age  67    Zostavax recommended one time at  age  62    Eye exam recommended yearly- not done yet     Bone density April 2011-osteopenia       FAMILY HISTORY:  Family History   Problem Relation Name Age of Onset    Breast cancer Mother      Hyperlipidemia Mother      Macular degeneration Mother      Emphysema Father emphysema     No Known Problems Sister      No Known Problems Brother ms     Macular degeneration Maternal Aunt      No Known Problems Maternal Uncle      No Known Problems Paternal Aunt      No Known Problems Paternal Uncle      No Known Problems Maternal Grandmother      No Known Problems Maternal Grandfather      No Known Problems Paternal Grandmother      No Known Problems Paternal Grandfather      Colon cancer Neg Hx      Ovarian cancer Neg Hx      Melanoma Neg Hx      Amblyopia Neg Hx      Blindness Neg Hx      Cancer Neg Hx      Cataracts Neg Hx       Diabetes Neg Hx      Hypertension Neg Hx      Retinal detachment Neg Hx      Strabismus Neg Hx      Stroke Neg Hx      Thyroid disease Neg Hx      Glaucoma Neg Hx         ALLERGIES AND MEDICATIONS: updated and reviewed.  Review of patient's allergies indicates:   Allergen Reactions    Hydromorphone Shortness Of Breath    Percocet  [oxycodone-acetaminophen] Itching and Nausea Only    Vicodin  [hydrocodone-acetaminophen] Nausea Only and Nausea And Vomiting     Medication List with Changes/Refills   Current Medications    ALBUTEROL (PROAIR HFA) 90 MCG/ACTUATION INHALER    Inhale 2 puffs into the lungs every 4 (four) hours as needed. 2 HFA Aerosol Inhaler Inhalation Every 4-6 hours    ALBUTEROL (PROAIR HFA) 90 MCG/ACTUATION INHALER    Inhale 2 puffs into the lungs every 4 (four) hours as needed. 2 HFA Aerosol Inhaler Inhalation Every 4-6 hours    ALENDRONATE (FOSAMAX) 70 MG TABLET    Take 1 tablet (70 mg total) by mouth every 7 days.    BENZONATATE (TESSALON) 200 MG CAPSULE    Take 1 capsule (200 mg total) by mouth 3 (three) times daily as needed for Cough.    CHOLECALCIFEROL, VITAMIN D3, (VITAMIN D3) 50 MCG (2,000 UNIT) TAB    Take 1 tablet (2,000 Units total) by mouth once daily.    CHOLECALCIFEROL, VITAMIN D3, (VITAMIN D3) 50 MCG (2,000 UNIT) TAB    Take 1 tablet (2,000 Units total) by mouth once daily.    DICLOFENAC SODIUM (VOLTAREN) 1 % GEL    Apply 2 g topically 3 (three) times daily.    FOLIC ACID (FOLVITE) 1 MG TABLET    TAKE 1 TABLET(1 MG) BY MOUTH EVERY DAY    LEVOTHYROXINE (SYNTHROID) 100 MCG TABLET    Take 1 tablet (100 mcg total) by mouth once daily. Fill brand name    MULTIVIT-MIN/FERROUS GLUCONATE (MULTI-DONNIE ORAL)    Take by mouth.    MUPIROCIN (BACTROBAN) 2 % OINTMENT    Apply to affected area 3 times daily    PROMETHAZINE-DEXTROMETHORPHAN (PROMETHAZINE-DM) 6.25-15 MG/5 ML SYRP    Take 5 mLs by mouth nightly as needed (cough).          CARE TEAM:  Patient Care Team:  Amando Prajapati MD as PCP -  General (Internal Medicine)  Poncho Rasmussen MA as Care Coordinator         REVIEW OF SYSTEMS:  Review of Systems   Constitutional:  Negative for chills, diaphoresis, fatigue and fever.   HENT:  Negative for congestion, postnasal drip, sinus pressure, sinus pain and sore throat.    Eyes:  Negative for photophobia and visual disturbance.   Respiratory:  Positive for cough. Negative for shortness of breath and wheezing.    Cardiovascular:  Negative for chest pain, palpitations and leg swelling.   Gastrointestinal:  Negative for abdominal pain and vomiting.   Genitourinary:  Negative for dysuria and frequency.   Musculoskeletal:  Negative for arthralgias and back pain.   Neurological:  Negative for light-headedness and headaches.   Psychiatric/Behavioral:  Negative for dysphoric mood. The patient is not nervous/anxious.          PHYSICAL EXAM:   Vitals:    04/22/24 1545   BP: 132/76   Pulse: 81   Temp: 98.1 °F (36.7 °C)             Body mass index is 28.39 kg/m².     General appearance - alert, well appearing, and in no distress  Mental status - alert, oriented to person, place, and time  Eyes - pupils equal and reactive, extraocular eye movements intact  Chest - clear to auscultation, no wheezes, rales or rhonchi, symmetric air entry  Heart - normal rate, regular rhythm, normal S1, S2, no murmurs, rubs, clicks or gallops  Neurological - alert, oriented, normal speech, no focal findings or movement disorder noted  Extremities - peripheral pulses normal, no pedal edema, no clubbing or cyanosis      ASSESSMENT AND PLAN:  Subacute cough  Chronic obstructive pulmonary disease, unspecified COPD type  -     CT Chest Lung Screening Low Dose; Future; Expected date: 04/22/2024  -     fluticasone furoate-vilanteroL (BREO ELLIPTA) 100-25 mcg/dose diskus inhaler; Inhale 1 puff into the lungs once daily. Controller  Dispense: 60 each; Refill: 2  -     albuterol (PROAIR HFA) 90 mcg/actuation inhaler; Inhale 2 puffs into the  lungs every 4 (four) hours as needed for Wheezing or Shortness of Breath. 2 HFA Aerosol Inhaler Inhalation Every 4-6 hours  Dispense: 54 g; Refill: 3  -     montelukast (SINGULAIR) 10 mg tablet; Take 1 tablet (10 mg total) by mouth every evening.  Dispense: 30 tablet; Refill: 0  -     predniSONE (DELTASONE) 20 MG tablet; Take 1 tablet (20 mg total) by mouth 2 (two) times daily. for 5 days  Dispense: 10 tablet; Refill: 0    History of tobacco use  -     CT Chest Lung Screening Low Dose; Future; Expected date: 04/22/2024    Hyperlipidemia, unspecified hyperlipidemia type  -     Comprehensive Metabolic Panel; Future; Expected date: 05/14/2024  -     Lipid Panel; Future; Expected date: 05/14/2024    Vitamin D deficiency  -     Vitamin D; Future; Expected date: 05/14/2024    Primary osteoarthritis involving multiple joints       - Stable on current medical management.    Acquired hypothyroidism  -     TSH; Future; Expected date: 05/14/2024    Rheumatoid arthritis involving multiple sites with positive rheumatoid factor  Immunosuppression  Long-term use of immunosuppressant medication        - Stable on current medical mgmt and followed by rheumatology.    Healthcare maintenance  -     Hemoglobin A1C; Future; Expected date: 05/14/2024  -     Comprehensive Metabolic Panel; Future; Expected date: 05/14/2024  -     Lipid Panel; Future; Expected date: 05/14/2024  -     CBC Auto Differential; Future; Expected date: 05/14/2024  -     TSH; Future; Expected date: 05/14/2024  -     Vitamin D; Future; Expected date: 05/14/2024    Early dry stage nonexudative age-related macular degeneration of both eyes  -     Hemoglobin A1C; Future; Expected date: 05/14/2024    Osteopenia, unspecified location  -     Ambulatory referral/consult to Endocrinology; Future; Expected date: 04/29/2024                Follow up 4 months or sooner as needed.

## 2024-05-21 DIAGNOSIS — M85.80 OSTEOPENIA, UNSPECIFIED LOCATION: ICD-10-CM

## 2024-05-21 DIAGNOSIS — J44.9 CHRONIC OBSTRUCTIVE PULMONARY DISEASE, UNSPECIFIED COPD TYPE: ICD-10-CM

## 2024-05-21 RX ORDER — ALBUTEROL SULFATE 90 UG/1
2 AEROSOL, METERED RESPIRATORY (INHALATION) EVERY 4 HOURS PRN
Qty: 54 G | Refills: 3 | OUTPATIENT
Start: 2024-05-21

## 2024-05-21 NOTE — TELEPHONE ENCOUNTER
No care due was identified.  [facility] Embedded Care Due Messages. Reference number: [id number].   5/21/2024 10:58:04 AM CDT

## 2024-05-21 NOTE — TELEPHONE ENCOUNTER
Care Due:                  Date            Visit Type   Department     Provider  --------------------------------------------------------------------------------                                [facility]      [facility]                              [facility]  MEDICINE/  Last Visit: 04-      FICE VISIT   INTERNAL MED   Amando Prajapati                               -         [facility]                              PRIMARY      MEDICINE/  Next Visit: 07-      CARE ([facility])   INTERNAL MED   Amando Prajapati                                                            Last  Test          Frequency    Reason                     Performed    Due Date  --------------------------------------------------------------------------------    CMP.........  12 months..  alendronate,               05- 05-                             cholecalciferol,.........    Mg Level....  12 months..  alendronate..............  Not Found    Overdue    Phosphate...  12 months..  alendronate..............  Not Found    Overdue    TSH.........  12 months..  levothyroxine............  05- 05-    Vitamin D...  12 months..  alendronate,               05- 05-                             cholecalciferol,.........    [employer] Embedded Care Due Messages. Reference number: [id number].   5/21/2024 10:47:13 AM CDT

## 2024-05-22 NOTE — TELEPHONE ENCOUNTER
Refill Decision Note   Darlyn Kinga  is requesting a refill authorization.  Brief Assessment and Rationale for Refill:        Medication Therapy Plan:           Comments:     No Care Gaps recommended.     Note composed:8:59 PM 05/21/2024

## 2024-05-23 RX ORDER — ALENDRONATE SODIUM 70 MG/1
70 TABLET ORAL
Qty: 4 TABLET | Refills: 11 | Status: SHIPPED | OUTPATIENT
Start: 2024-05-23 | End: 2025-05-23

## 2024-06-01 ENCOUNTER — LAB VISIT (OUTPATIENT)
Dept: LAB | Facility: HOSPITAL | Age: 64
End: 2024-06-01
Attending: INTERNAL MEDICINE
Payer: COMMERCIAL

## 2024-06-01 DIAGNOSIS — H35.3131 EARLY DRY STAGE NONEXUDATIVE AGE-RELATED MACULAR DEGENERATION OF BOTH EYES: ICD-10-CM

## 2024-06-01 DIAGNOSIS — Z00.00 HEALTHCARE MAINTENANCE: ICD-10-CM

## 2024-06-01 DIAGNOSIS — E55.9 VITAMIN D DEFICIENCY: ICD-10-CM

## 2024-06-01 DIAGNOSIS — E78.5 HYPERLIPIDEMIA, UNSPECIFIED HYPERLIPIDEMIA TYPE: ICD-10-CM

## 2024-06-01 DIAGNOSIS — E03.9 ACQUIRED HYPOTHYROIDISM: ICD-10-CM

## 2024-06-01 LAB
25(OH)D3+25(OH)D2 SERPL-MCNC: 54 NG/ML (ref 30–96)
ALBUMIN SERPL BCP-MCNC: 3.7 G/DL (ref 3.5–5.2)
ALP SERPL-CCNC: 100 U/L (ref 55–135)
ALT SERPL W/O P-5'-P-CCNC: 17 U/L (ref 10–44)
ANION GAP SERPL CALC-SCNC: 10 MMOL/L (ref 8–16)
AST SERPL-CCNC: 12 U/L (ref 10–40)
BASOPHILS # BLD AUTO: 0.06 K/UL (ref 0–0.2)
BASOPHILS NFR BLD: 0.7 % (ref 0–1.9)
BILIRUB SERPL-MCNC: 0.4 MG/DL (ref 0.1–1)
BUN SERPL-MCNC: 8 MG/DL (ref 8–23)
CALCIUM SERPL-MCNC: 9.7 MG/DL (ref 8.7–10.5)
CHLORIDE SERPL-SCNC: 107 MMOL/L (ref 95–110)
CHOLEST SERPL-MCNC: 195 MG/DL (ref 120–199)
CHOLEST/HDLC SERPL: 5.4 {RATIO} (ref 2–5)
CO2 SERPL-SCNC: 27 MMOL/L (ref 23–29)
CREAT SERPL-MCNC: 0.8 MG/DL (ref 0.5–1.4)
DIFFERENTIAL METHOD BLD: ABNORMAL
EOSINOPHIL # BLD AUTO: 0.2 K/UL (ref 0–0.5)
EOSINOPHIL NFR BLD: 2.4 % (ref 0–8)
ERYTHROCYTE [DISTWIDTH] IN BLOOD BY AUTOMATED COUNT: 13.2 % (ref 11.5–14.5)
EST. GFR  (NO RACE VARIABLE): >60 ML/MIN/1.73 M^2
ESTIMATED AVG GLUCOSE: 117 MG/DL (ref 68–131)
GLUCOSE SERPL-MCNC: 90 MG/DL (ref 70–110)
HBA1C MFR BLD: 5.7 % (ref 4–5.6)
HCT VFR BLD AUTO: 45.7 % (ref 37–48.5)
HDLC SERPL-MCNC: 36 MG/DL (ref 40–75)
HDLC SERPL: 18.5 % (ref 20–50)
HGB BLD-MCNC: 14.2 G/DL (ref 12–16)
IMM GRANULOCYTES # BLD AUTO: 0.02 K/UL (ref 0–0.04)
IMM GRANULOCYTES NFR BLD AUTO: 0.2 % (ref 0–0.5)
LDLC SERPL CALC-MCNC: 132.8 MG/DL (ref 63–159)
LYMPHOCYTES # BLD AUTO: 2.5 K/UL (ref 1–4.8)
LYMPHOCYTES NFR BLD: 27.2 % (ref 18–48)
MCH RBC QN AUTO: 29.5 PG (ref 27–31)
MCHC RBC AUTO-ENTMCNC: 31.1 G/DL (ref 32–36)
MCV RBC AUTO: 95 FL (ref 82–98)
MONOCYTES # BLD AUTO: 0.7 K/UL (ref 0.3–1)
MONOCYTES NFR BLD: 7.1 % (ref 4–15)
NEUTROPHILS # BLD AUTO: 5.7 K/UL (ref 1.8–7.7)
NEUTROPHILS NFR BLD: 62.4 % (ref 38–73)
NONHDLC SERPL-MCNC: 159 MG/DL
NRBC BLD-RTO: 0 /100 WBC
PLATELET # BLD AUTO: 348 K/UL (ref 150–450)
PMV BLD AUTO: 10.4 FL (ref 9.2–12.9)
POTASSIUM SERPL-SCNC: 4.2 MMOL/L (ref 3.5–5.1)
PROT SERPL-MCNC: 7.2 G/DL (ref 6–8.4)
RBC # BLD AUTO: 4.81 M/UL (ref 4–5.4)
SODIUM SERPL-SCNC: 144 MMOL/L (ref 136–145)
T4 FREE SERPL-MCNC: 1.25 NG/DL (ref 0.71–1.51)
TRIGL SERPL-MCNC: 131 MG/DL (ref 30–150)
TSH SERPL DL<=0.005 MIU/L-ACNC: 0.31 UIU/ML (ref 0.4–4)
WBC # BLD AUTO: 9.12 K/UL (ref 3.9–12.7)

## 2024-06-01 PROCEDURE — 82306 VITAMIN D 25 HYDROXY: CPT | Performed by: INTERNAL MEDICINE

## 2024-06-01 PROCEDURE — 84443 ASSAY THYROID STIM HORMONE: CPT | Performed by: INTERNAL MEDICINE

## 2024-06-01 PROCEDURE — 85025 COMPLETE CBC W/AUTO DIFF WBC: CPT | Performed by: INTERNAL MEDICINE

## 2024-06-01 PROCEDURE — 36415 COLL VENOUS BLD VENIPUNCTURE: CPT | Mod: PO | Performed by: INTERNAL MEDICINE

## 2024-06-01 PROCEDURE — 83036 HEMOGLOBIN GLYCOSYLATED A1C: CPT | Performed by: INTERNAL MEDICINE

## 2024-06-01 PROCEDURE — 80053 COMPREHEN METABOLIC PANEL: CPT | Performed by: INTERNAL MEDICINE

## 2024-06-01 PROCEDURE — 84439 ASSAY OF FREE THYROXINE: CPT | Performed by: INTERNAL MEDICINE

## 2024-06-01 PROCEDURE — 80061 LIPID PANEL: CPT | Performed by: INTERNAL MEDICINE

## 2024-06-04 DIAGNOSIS — E03.9 ACQUIRED HYPOTHYROIDISM: Primary | ICD-10-CM

## 2024-06-04 RX ORDER — LEVOTHYROXINE SODIUM 88 UG/1
88 TABLET ORAL
Qty: 30 TABLET | Refills: 11 | Status: SHIPPED | OUTPATIENT
Start: 2024-06-04 | End: 2025-06-04

## 2024-06-06 ENCOUNTER — TELEPHONE (OUTPATIENT)
Dept: FAMILY MEDICINE | Facility: CLINIC | Age: 64
End: 2024-06-06
Payer: COMMERCIAL

## 2024-06-06 NOTE — TELEPHONE ENCOUNTER
----- Message from Amando Prajapati MD sent at 6/4/2024  1:29 PM CDT -----  Message sent on portal.    Schedule non fasting tsh and free t4 2 months.

## 2024-07-22 ENCOUNTER — OFFICE VISIT (OUTPATIENT)
Dept: FAMILY MEDICINE | Facility: CLINIC | Age: 64
End: 2024-07-22
Payer: COMMERCIAL

## 2024-07-22 VITALS
DIASTOLIC BLOOD PRESSURE: 74 MMHG | OXYGEN SATURATION: 92 % | WEIGHT: 158.31 LBS | TEMPERATURE: 98 F | HEIGHT: 62 IN | BODY MASS INDEX: 29.13 KG/M2 | SYSTOLIC BLOOD PRESSURE: 136 MMHG | HEART RATE: 82 BPM

## 2024-07-22 DIAGNOSIS — Z87.891 HISTORY OF TOBACCO USE: Primary | ICD-10-CM

## 2024-07-22 DIAGNOSIS — Z12.2 ENCOUNTER FOR SCREENING FOR LUNG CANCER: ICD-10-CM

## 2024-07-22 DIAGNOSIS — Z23 NEED FOR PNEUMOCOCCAL VACCINATION: ICD-10-CM

## 2024-07-22 DIAGNOSIS — J44.9 CHRONIC OBSTRUCTIVE PULMONARY DISEASE, UNSPECIFIED COPD TYPE: ICD-10-CM

## 2024-07-22 DIAGNOSIS — M05.79 RHEUMATOID ARTHRITIS INVOLVING MULTIPLE SITES WITH POSITIVE RHEUMATOID FACTOR: ICD-10-CM

## 2024-07-22 PROCEDURE — 99214 OFFICE O/P EST MOD 30 MIN: CPT | Mod: 25,S$GLB,, | Performed by: INTERNAL MEDICINE

## 2024-07-22 PROCEDURE — 99999 PR PBB SHADOW E&M-EST. PATIENT-LVL V: CPT | Mod: PBBFAC,,, | Performed by: INTERNAL MEDICINE

## 2024-07-22 PROCEDURE — 3008F BODY MASS INDEX DOCD: CPT | Mod: CPTII,S$GLB,, | Performed by: INTERNAL MEDICINE

## 2024-07-22 PROCEDURE — 90677 PCV20 VACCINE IM: CPT | Mod: S$GLB,,, | Performed by: INTERNAL MEDICINE

## 2024-07-22 PROCEDURE — 3078F DIAST BP <80 MM HG: CPT | Mod: CPTII,S$GLB,, | Performed by: INTERNAL MEDICINE

## 2024-07-22 PROCEDURE — 3044F HG A1C LEVEL LT 7.0%: CPT | Mod: CPTII,S$GLB,, | Performed by: INTERNAL MEDICINE

## 2024-07-22 PROCEDURE — 90471 IMMUNIZATION ADMIN: CPT | Mod: S$GLB,,, | Performed by: INTERNAL MEDICINE

## 2024-07-22 PROCEDURE — 1159F MED LIST DOCD IN RCRD: CPT | Mod: CPTII,S$GLB,, | Performed by: INTERNAL MEDICINE

## 2024-07-22 PROCEDURE — 3075F SYST BP GE 130 - 139MM HG: CPT | Mod: CPTII,S$GLB,, | Performed by: INTERNAL MEDICINE

## 2024-07-22 RX ORDER — TIZANIDINE 2 MG/1
2 TABLET ORAL EVERY 8 HOURS PRN
Qty: 30 TABLET | Refills: 0 | Status: SHIPPED | OUTPATIENT
Start: 2024-07-22 | End: 2024-08-01

## 2024-07-22 RX ORDER — FLUTICASONE FUROATE AND VILANTEROL 100; 25 UG/1; UG/1
1 POWDER RESPIRATORY (INHALATION) DAILY
Qty: 60 EACH | Refills: 5 | Status: SHIPPED | OUTPATIENT
Start: 2024-07-22

## 2024-07-22 NOTE — PATIENT INSTRUCTIONS
Look into receiving the RSV vaccination at your local pharmacy.  RSV vaccine can prevent lower respiratory tract disease caused by respiratory syncytial virus (RSV). RSV is a common respiratory virus that usually causes mild, cold-like symptoms.     Please see the following for more information:  https://www.cdc.gov/vaccines/hcp/vis/vis-statements/rsv.html

## 2024-07-22 NOTE — PROGRESS NOTES
"HISTORY OF PRESENT ILLNESS:  Darlyn Donato is a 64 y.o. female who presents to the clinic today for Annual Exam    Last seen by me 4/2024.    Prediabetes  Noted on June lab work.    Cough, h/o tobacco use  At last visit was noted since Jan/Feb when she had bronchitis.  Prescribed Breo Ellipta and prn albuterol inh.     Macular degeneration - Seeing Retina Specialist Dr. Irving Wolf for macular degeneration and receiving tx.      RA - Previously managed with MTX and folic acid.  Followed by Dr. Hernandez.  Did not tolerate sulfasalazine.  Not candidate for plaquenil due to macular degeneration.     Osteopenia - On Fosamax.    PAST MEDICAL HISTORY:  Past Medical History:   Diagnosis Date    Arthritis     Depression     Early dry stage nonexudative age-related macular degeneration of both eyes 8/19/2019    Elevated alkaline phosphatase level     Hyperlipidemia     Nuclear sclerosis of both eyes 8/19/2019    Osteopenia     Personal history of female infertility     Respiratory distress     " stopped Breathing" after 2 pain meds were given in a close time frame to one another.    Thyroid disease     multinodular goiter    Vitamin D deficiency disease        PAST SURGICAL HISTORY:  Past Surgical History:   Procedure Laterality Date    BREAST BIOPSY Left     over 10 yrs ago    BREAST SURGERY      benign biopsy on left    COLONOSCOPY N/A 6/7/2019    Procedure: COLONOSCOPY;  Surgeon: Julian Waldrop MD;  Location: [facility] ([address] [place] [address]);  Service: Endoscopy;  Laterality: N/A;  2nd floor - per anethesia note from Pt hysterectomy 2013, Pt possible difficult intubation - ERW    COLONOSCOPY N/A 11/1/2021    Procedure: COLONOSCOPY;  Surgeon: Brody Sanchez MD;  Location: [facility];  Service: Endoscopy;  Laterality: N/A;  pt completed COVID vaccine- see Immunization record in chart-rb  pt reports n/v with anesthesia  covid test 10/1 [place], prep instr portal -ml  8/10 LVM and portal of new arrival time-ml  9/14 r/s again due to " 10/4 date no OutPts scheduling; Pt wants Early Mondays ONLY  covid elda    HYSTERECTOMY      LAPAROSCOPY W/ MINI-LAPAROTOMY      for fertility issues    OOPHORECTOMY      AL REMOVAL OF OVARY/TUBE(S)      RECTAL SURGERY      excess skin growth removal    supracervical hysterectomy      TONSILLECTOMY         SOCIAL HISTORY:  Social History     Socioeconomic History    Marital status:     Number of children: 0    Years of education: some colle   Occupational History    Occupation:  law firm     Employer: [employer]   Tobacco Use    Smoking status: Former     Current packs/day: 2.50     Average packs/day: 2.5 packs/day for 30.0 years (75.0 ttl pk-yrs)     Types: Cigarettes    Smokeless tobacco: Never   Substance and Sexual Activity    Alcohol use: No     Comment: Rare    Drug use: No    Sexual activity: Not Currently     Partners: Male   Social History Narrative    Adult Screenings    Mammogram( for females) done 4/2011    Pap ( for females)? Needs referral to  GYN    Colonoscopy age  52-Not done yet    Flu shot yearly to be done today  1/9/13    Td ?    Pneumovax recommended one time  at age  67    Zostavax recommended one time at  age  62    Eye exam recommended yearly- not done yet     Bone density April 2011-osteopenia       FAMILY HISTORY:  Family History   Problem Relation Name Age of Onset    Breast cancer Mother      Hyperlipidemia Mother      Macular degeneration Mother      Emphysema Father emphysema     No Known Problems Sister      No Known Problems Brother ms     Macular degeneration Maternal Aunt      No Known Problems Maternal Uncle      No Known Problems Paternal Aunt      No Known Problems Paternal Uncle      No Known Problems Maternal Grandmother      No Known Problems Maternal Grandfather      No Known Problems Paternal Grandmother      No Known Problems Paternal Grandfather      Colon cancer Neg Hx      Ovarian cancer Neg Hx      Melanoma Neg Hx      Amblyopia Neg Hx       Blindness Neg Hx      Cancer Neg Hx      Cataracts Neg Hx      Diabetes Neg Hx      Hypertension Neg Hx      Retinal detachment Neg Hx      Strabismus Neg Hx      Stroke Neg Hx      Thyroid disease Neg Hx      Glaucoma Neg Hx         ALLERGIES AND MEDICATIONS: updated and reviewed.  Review of patient's allergies indicates:   Allergen Reactions    Hydromorphone Shortness Of Breath    Percocet  [oxycodone-acetaminophen] Itching and Nausea Only    Vicodin  [hydrocodone-acetaminophen] Nausea Only and Nausea And Vomiting     Medication List with Changes/Refills   Current Medications    ALBUTEROL (PROAIR HFA) 90 MCG/ACTUATION INHALER    Inhale 2 puffs into the lungs every 4 (four) hours as needed for Wheezing or Shortness of Breath. 2 HFA Aerosol Inhaler Inhalation Every 4-6 hours    ALENDRONATE (FOSAMAX) 70 MG TABLET    Take 1 tablet (70 mg total) by mouth every 7 days.    BENZONATATE (TESSALON) 200 MG CAPSULE    Take 1 capsule (200 mg total) by mouth 3 (three) times daily as needed for Cough.    CHOLECALCIFEROL, VITAMIN D3, (VITAMIN D3) 50 MCG (2,000 UNIT) TAB    Take 1 tablet (2,000 Units total) by mouth once daily.    CHOLECALCIFEROL, VITAMIN D3, (VITAMIN D3) 50 MCG (2,000 UNIT) TAB    Take 1 tablet (2,000 Units total) by mouth once daily.    DICLOFENAC SODIUM (VOLTAREN) 1 % GEL    Apply 2 g topically 3 (three) times daily.    FLUTICASONE FUROATE-VILANTEROL (BREO ELLIPTA) 100-25 MCG/DOSE DISKUS INHALER    Inhale 1 puff into the lungs once daily. Controller    FOLIC ACID (FOLVITE) 1 MG TABLET    TAKE 1 TABLET(1 MG) BY MOUTH EVERY DAY    LEVOTHYROXINE (SYNTHROID) 88 MCG TABLET    Take 1 tablet (88 mcg total) by mouth before breakfast.    MULTIVIT-MIN/FERROUS GLUCONATE (MULTI-DONNIE ORAL)    Take by mouth.    MUPIROCIN (BACTROBAN) 2 % OINTMENT    Apply to affected area 3 times daily    PROMETHAZINE-DEXTROMETHORPHAN (PROMETHAZINE-DM) 6.25-15 MG/5 ML SYRP    Take 5 mLs by mouth nightly as needed (cough).          CARE  TEAM:  Patient Care Team:  Amando Prajapati MD as PCP - General (Internal Medicine)  Poncho Rasmussen MA as Care Coordinator         REVIEW OF SYSTEMS:  Review of Systems   Constitutional:  Negative for chills and fever.   HENT:  Negative for congestion and postnasal drip.    Eyes:  Negative for photophobia and visual disturbance.   Respiratory:  Negative for cough and shortness of breath.    Cardiovascular:  Negative for chest pain and palpitations.   Gastrointestinal:  Negative for nausea and vomiting.   Genitourinary:  Negative for dysuria and frequency.   Musculoskeletal:  Negative for arthralgias and back pain.   Neurological:  Negative for light-headedness and headaches.   Psychiatric/Behavioral:  Negative for dysphoric mood. The patient is not nervous/anxious.          PHYSICAL EXAM:   Vitals:    07/22/24 1502   BP: 136/74   Pulse: 82   Temp: 98.4 °F (36.9 °C)             Body mass index is 28.95 kg/m².     General appearance - alert, well appearing, and in no distress  Mental status - alert, oriented to person, place, and time  Eyes - pupils equal and reactive, extraocular eye movements intact  Chest - clear to auscultation, no wheezes, rales or rhonchi, symmetric air entry  Heart - normal rate, regular rhythm, normal S1, S2, no murmurs, rubs, clicks or gallops  Neurological - alert, oriented, normal speech, no focal findings or movement disorder noted  Musculoskeletal - no joint tenderness, deformity or swelling  Extremities - peripheral pulses normal, no pedal edema, no clubbing or cyanosis      ASSESSMENT AND PLAN:  History of tobacco use  -     CT Chest Lung Screening Low Dose; Future; Expected date: 07/22/2024    Encounter for screening for lung cancer  -     CT Chest Lung Screening Low Dose; Future; Expected date: 07/22/2024    Chronic obstructive pulmonary disease, unspecified COPD type  -     fluticasone furoate-vilanteroL (BREO ELLIPTA) 100-25 mcg/dose diskus inhaler; Inhale 1 puff into the  lungs once daily. Controller  Dispense: 60 each; Refill: 5  -     Ambulatory referral/consult to Pulmonology; Future; Expected date: 07/29/2024    Rheumatoid arthritis involving multiple sites with positive rheumatoid factor  -     Ambulatory referral/consult to Rheumatology; Future; Expected date: 07/29/2024  -     tiZANidine (ZANAFLEX) 2 MG tablet; Take 1 tablet (2 mg total) by mouth every 8 (eight) hours as needed.  Dispense: 30 tablet; Refill: 0    Need for pneumococcal vaccination  -     pneumoc 20-wanda conj-dip cr(PF) (PREVNAR-20 (PF)) injection Syrg 0.5 mL              Follow up 6 months or sooner as needed.

## 2024-07-23 ENCOUNTER — TELEPHONE (OUTPATIENT)
Dept: PULMONOLOGY | Facility: CLINIC | Age: 64
End: 2024-07-23
Payer: COMMERCIAL

## 2024-07-23 DIAGNOSIS — J44.9 CHRONIC OBSTRUCTIVE PULMONARY DISEASE, UNSPECIFIED COPD TYPE: Primary | ICD-10-CM

## 2024-07-25 ENCOUNTER — HOSPITAL ENCOUNTER (OUTPATIENT)
Dept: RADIOLOGY | Facility: HOSPITAL | Age: 64
Discharge: HOME | End: 2024-07-25
Attending: INTERNAL MEDICINE
Payer: COMMERCIAL

## 2024-07-25 DIAGNOSIS — Z12.2 ENCOUNTER FOR SCREENING FOR LUNG CANCER: ICD-10-CM

## 2024-07-25 DIAGNOSIS — Z87.891 HISTORY OF TOBACCO USE: ICD-10-CM

## 2024-07-25 PROCEDURE — 71271 CT THORAX LUNG CANCER SCR C-: CPT | Mod: TC

## 2024-07-31 ENCOUNTER — HOSPITAL ENCOUNTER (OUTPATIENT)
Dept: RESPIRATORY THERAPY | Facility: HOSPITAL | Age: 64
Discharge: HOME | End: 2024-07-31
Attending: STUDENT IN AN ORGANIZED HEALTH CARE EDUCATION/TRAINING PROGRAM
Payer: COMMERCIAL

## 2024-07-31 VITALS — HEART RATE: 80 BPM | OXYGEN SATURATION: 98 % | RESPIRATION RATE: 18 BRPM

## 2024-07-31 DIAGNOSIS — J44.9 CHRONIC OBSTRUCTIVE PULMONARY DISEASE, UNSPECIFIED COPD TYPE: ICD-10-CM

## 2024-07-31 PROCEDURE — 94729 DIFFUSING CAPACITY: CPT

## 2024-07-31 PROCEDURE — 94200 LUNG FUNCTION TEST (MBC/MVV): CPT

## 2024-07-31 PROCEDURE — 25000242 PHARM REV CODE 250 ALT 637 W/ HCPCS: Performed by: STUDENT IN AN ORGANIZED HEALTH CARE EDUCATION/TRAINING PROGRAM

## 2024-07-31 RX ORDER — ALBUTEROL SULFATE 2.5 MG/.5ML
2.5 SOLUTION RESPIRATORY (INHALATION) ONCE
Status: COMPLETED | OUTPATIENT
Start: 2024-07-31 | End: 2024-07-31

## 2024-07-31 RX ADMIN — ALBUTEROL SULFATE 2.5 MG: 2.5 SOLUTION RESPIRATORY (INHALATION) at 08:07

## 2024-08-10 ENCOUNTER — LAB VISIT (OUTPATIENT)
Dept: LAB | Facility: HOSPITAL | Age: 64
End: 2024-08-10
Attending: INTERNAL MEDICINE
Payer: COMMERCIAL

## 2024-08-10 DIAGNOSIS — E03.9 ACQUIRED HYPOTHYROIDISM: ICD-10-CM

## 2024-08-10 LAB
T4 FREE SERPL-MCNC: 1.13 NG/DL (ref 0.71–1.51)
TSH SERPL DL<=0.005 MIU/L-ACNC: 1.33 UIU/ML (ref 0.4–4)

## 2024-08-10 PROCEDURE — 84439 ASSAY OF FREE THYROXINE: CPT | Performed by: INTERNAL MEDICINE

## 2024-08-10 PROCEDURE — 84443 ASSAY THYROID STIM HORMONE: CPT | Performed by: INTERNAL MEDICINE

## 2024-08-10 PROCEDURE — 36415 COLL VENOUS BLD VENIPUNCTURE: CPT | Mod: PO | Performed by: INTERNAL MEDICINE

## 2024-08-12 NOTE — PROGRESS NOTES
Subjective:      Patient ID: Darlyn Donato is a 64 y.o. female.    Chief Complaint: COPD and Shortness of Breath    Pt is a 63 yo CW pmh RA previously on MTX, former tobacco use disorder, macular degeneration presenting for evaluation of COPD. Cold last year. Persistent cough and went to urgent care and was started on cough medications and inhaler. Has since been placed on Breo. Had good relief from shortness of breath and improvement of cough.   Continues to have cough, but much improved. No post nasal drip. Has significant heart burn at night when going to bed.      Having severe pain in hands, fingers and feet due to RA.     Smoking hx: quit 16 years ago, started as teenager Hector, 30 years 2-3 ppd.    Work hx: book keeper  Exposure hx: no chem/fumes, no pets, pillows with feathers (has pillow covers)  Inhaler use: Breo   PRN inhaler use: albuterol- 2x/day during summer.   Hx of lung dz: COPD  Family hx of lung dz:   Activity level: shortness of breath with stairs, cleaning house.   No history of hospitalizations due to breathing.     Review of Systems   Cardiovascular:  Negative for chest pain, palpitations and leg swelling.   Musculoskeletal:  Positive for arthralgias (hands, knuckles, fingers, toes, bottom of feet).   Skin:  Negative for rash.   Neurological:  Negative for dizziness, syncope and light-headedness.   Psychiatric/Behavioral:  Negative for confusion and sleep disturbance. The patient is not nervous/anxious.      Objective:     Physical Exam   Constitutional: She is oriented to person, place, and time.   Musculoskeletal:         General: No edema.      Comments: Right 5th finger swelling. Proximal hand joint pain.    Neurological: She is alert and oriented to person, place, and time. Gait normal.   Skin: No cyanosis. Nails show no clubbing.       Personal Diagnostic Review    CT of chest performed on 7/25/24 without contrast revealed bilateral upper lobe predominant centrilobular emphysema. l  "opacity noted on previous CT renal stone . Micronodules located in lingula.    Echocardiogram: none    Pulmonary function tests:   24  FEV1: 1.36L  (60.2 % predicted),   FVC:  2.75L (96.3 % predicted),   FEV1/FVC:  49,   T.21L (92.1 % predicted),   DLCO: 9.90 (46.9 % predicted)        2024     8:31 AM 2024     3:02 PM 2024     3:45 PM 2024    12:57 PM 2023     4:19 PM 2023     8:48 AM 2022     8:34 AM   Pulmonary Function Tests   SpO2 98 % 92 % 93 % 95 %  96 %    Height  5' 2" (1.575 m) 5' 2" (1.575 m) 5' 2" (1.575 m) 5' 2" (1.575 m) 5' 2" (1.575 m) 5' 2" (1.575 m)   Weight  71.8 kg (158 lb 4.6 oz) 70.4 kg (155 lb 3.3 oz) 69.4 kg (153 lb) 69.5 kg (153 lb 3.5 oz) 66.1 kg (145 lb 11.6 oz) 69.8 kg (153 lb 14.1 oz)   BMI (Calculated)  28.9 28.4 28 28 26.6 28.1     Assessment:     1. Centrilobular emphysema    2. Chronic cough    3. Gastroesophageal reflux disease without esophagitis         Outpatient Encounter Medications as of 2024   Medication Sig Dispense Refill    albuterol (PROAIR HFA) 90 mcg/actuation inhaler Inhale 2 puffs into the lungs every 4 (four) hours as needed for Wheezing or Shortness of Breath. 2 HFA Aerosol Inhaler Inhalation Every 4-6 hours 54 g 3    alendronate (FOSAMAX) 70 MG tablet Take 1 tablet (70 mg total) by mouth every 7 days. 4 tablet 11    benzonatate (TESSALON) 200 MG capsule Take 1 capsule (200 mg total) by mouth 3 (three) times daily as needed for Cough. 30 capsule 0    cholecalciferol, vitamin D3, (VITAMIN D3) 50 mcg (2,000 unit) Tab Take 1 tablet (2,000 Units total) by mouth once daily. 90 tablet 1    cholecalciferol, vitamin D3, (VITAMIN D3) 50 mcg (2,000 unit) Tab Take 1 tablet (2,000 Units total) by mouth once daily. 90 tablet 1    diclofenac sodium (VOLTAREN) 1 % Gel Apply 2 g topically 3 (three) times daily. 1 Tube 2    folic acid (FOLVITE) 1 MG tablet TAKE 1 TABLET(1 MG) BY MOUTH EVERY DAY 30 tablet 11    levothyroxine " (SYNTHROID) 88 MCG tablet Take 1 tablet (88 mcg total) by mouth before breakfast. 30 tablet 11    multivit-min/ferrous gluconate (MULTI-DONNIE ORAL) Take by mouth.      mupirocin (BACTROBAN) 2 % ointment Apply to affected area 3 times daily 22 g 1    promethazine-dextromethorphan (PROMETHAZINE-DM) 6.25-15 mg/5 mL Syrp Take 5 mLs by mouth nightly as needed (cough). 118 mL 0    [DISCONTINUED] fluticasone furoate-vilanteroL (BREO ELLIPTA) 100-25 mcg/dose diskus inhaler Inhale 1 puff into the lungs once daily. Controller 60 each 5    fluticasone-umeclidin-vilanter (TRELEGY ELLIPTA) 200-62.5-25 mcg inhaler Inhale 1 puff into the lungs once daily. 60 each 6    [] tiZANidine (ZANAFLEX) 2 MG tablet Take 1 tablet (2 mg total) by mouth every 8 (eight) hours as needed. 30 tablet 0    [DISCONTINUED] methotrexate 25 mg/mL injection Please take 0.8mL into the skin once a week x 4 weeks and if tolerating 1mL afterwards 4 mL 3    [DISCONTINUED] tamsulosin (FLOMAX) 0.4 mg Cap Take 1 capsule (0.4 mg total) by mouth once daily. for 10 days 10 capsule 0     No facility-administered encounter medications on file as of 2024.     No orders of the defined types were placed in this encounter.      Plan:     Centrilobular emphysema  On Breo, requiring use of albuterol 2x/day. Increased requirement since the onset of heat and humidity. When started on ICS/LABA, had significant improvement in shortness of breath and cough.   - escalate Breo to Trelegy  - continue PRN use of albuterol.     Cough  Pt states she has frequent heart burn. Would start on famotidine for improved reflux control.     Gastroesophageal reflux disease without esophagitis  Would start famotidine.     Follow up in 6 months.     David Carrasco MD  [facility]

## 2024-08-14 ENCOUNTER — OFFICE VISIT (OUTPATIENT)
Dept: PULMONOLOGY | Facility: CLINIC | Age: 64
End: 2024-08-14
Payer: COMMERCIAL

## 2024-08-14 VITALS
DIASTOLIC BLOOD PRESSURE: 70 MMHG | SYSTOLIC BLOOD PRESSURE: 110 MMHG | WEIGHT: 154.75 LBS | HEART RATE: 75 BPM | OXYGEN SATURATION: 97 % | HEIGHT: 62 IN | BODY MASS INDEX: 28.48 KG/M2

## 2024-08-14 DIAGNOSIS — R05.3 CHRONIC COUGH: ICD-10-CM

## 2024-08-14 DIAGNOSIS — K21.9 GASTROESOPHAGEAL REFLUX DISEASE WITHOUT ESOPHAGITIS: ICD-10-CM

## 2024-08-14 DIAGNOSIS — J43.2 CENTRILOBULAR EMPHYSEMA: Primary | ICD-10-CM

## 2024-08-14 PROCEDURE — 3078F DIAST BP <80 MM HG: CPT | Mod: CPTII,S$GLB,, | Performed by: INTERNAL MEDICINE

## 2024-08-14 PROCEDURE — 1159F MED LIST DOCD IN RCRD: CPT | Mod: CPTII,S$GLB,, | Performed by: INTERNAL MEDICINE

## 2024-08-14 PROCEDURE — 99999 PR PBB SHADOW E&M-EST. PATIENT-LVL IV: CPT | Mod: PBBFAC,,, | Performed by: INTERNAL MEDICINE

## 2024-08-14 PROCEDURE — 3008F BODY MASS INDEX DOCD: CPT | Mod: CPTII,S$GLB,, | Performed by: INTERNAL MEDICINE

## 2024-08-14 PROCEDURE — 3044F HG A1C LEVEL LT 7.0%: CPT | Mod: CPTII,S$GLB,, | Performed by: INTERNAL MEDICINE

## 2024-08-14 PROCEDURE — 3074F SYST BP LT 130 MM HG: CPT | Mod: CPTII,S$GLB,, | Performed by: INTERNAL MEDICINE

## 2024-08-14 PROCEDURE — 99204 OFFICE O/P NEW MOD 45 MIN: CPT | Mod: S$GLB,,, | Performed by: INTERNAL MEDICINE

## 2024-08-14 NOTE — ASSESSMENT & PLAN NOTE
On Breo, requiring use of albuterol 2x/day. Increased requirement since the onset of heat and humidity. When started on ICS/LABA, had significant improvement in shortness of breath and cough.   - escalate Breo to Trelegy  - continue PRN use of albuterol.

## 2024-08-27 ENCOUNTER — OFFICE VISIT (OUTPATIENT)
Dept: ENDOCRINOLOGY | Facility: CLINIC | Age: 64
End: 2024-08-27
Payer: COMMERCIAL

## 2024-08-27 VITALS
DIASTOLIC BLOOD PRESSURE: 86 MMHG | HEART RATE: 64 BPM | WEIGHT: 154.38 LBS | SYSTOLIC BLOOD PRESSURE: 132 MMHG | HEIGHT: 63 IN | BODY MASS INDEX: 27.36 KG/M2

## 2024-08-27 DIAGNOSIS — E03.9 ACQUIRED HYPOTHYROIDISM: ICD-10-CM

## 2024-08-27 DIAGNOSIS — M85.80 OSTEOPENIA, UNSPECIFIED LOCATION: ICD-10-CM

## 2024-08-27 DIAGNOSIS — M81.0 OSTEOPOROSIS, UNSPECIFIED OSTEOPOROSIS TYPE, UNSPECIFIED PATHOLOGICAL FRACTURE PRESENCE: Primary | ICD-10-CM

## 2024-08-27 DIAGNOSIS — E55.9 VITAMIN D DEFICIENCY DISEASE: ICD-10-CM

## 2024-08-27 PROCEDURE — 3008F BODY MASS INDEX DOCD: CPT | Mod: CPTII,S$GLB,, | Performed by: HOSPITALIST

## 2024-08-27 PROCEDURE — 99999 PR PBB SHADOW E&M-EST. PATIENT-LVL IV: CPT | Mod: PBBFAC,,, | Performed by: HOSPITALIST

## 2024-08-27 PROCEDURE — 99205 OFFICE O/P NEW HI 60 MIN: CPT | Mod: S$GLB,,, | Performed by: HOSPITALIST

## 2024-08-27 PROCEDURE — 3044F HG A1C LEVEL LT 7.0%: CPT | Mod: CPTII,S$GLB,, | Performed by: HOSPITALIST

## 2024-08-27 PROCEDURE — 3079F DIAST BP 80-89 MM HG: CPT | Mod: CPTII,S$GLB,, | Performed by: HOSPITALIST

## 2024-08-27 PROCEDURE — 1159F MED LIST DOCD IN RCRD: CPT | Mod: CPTII,S$GLB,, | Performed by: HOSPITALIST

## 2024-08-27 PROCEDURE — 3075F SYST BP GE 130 - 139MM HG: CPT | Mod: CPTII,S$GLB,, | Performed by: HOSPITALIST

## 2024-08-27 NOTE — ASSESSMENT & PLAN NOTE
- Chronic issue, early 30s  - stable thyroid function  - on chronic levothyroxine 88 mcg daily  - continue management with PCP

## 2024-08-27 NOTE — ASSESSMENT & PLAN NOTE
- Patient is here for evaluation and management of osteoporosis, initial diagnose: 2021  - Independently reviewed: Reviewed 10/12/2023, Compare from 8289-4508 bone density: +4% improvement in lumbar spine, 1% improvement in total hip.  Still with osteoporosis lumbar spine T-score -2.9  - Current medication:  Fosamax once a week started 04/2021 to 8/27/2024  - No: History of falls, history of compression fracture, history of fragility/post menopause fractures  - She is interested in switching to more aggressive therapy.      Plan  - Discuss treatment options:  Given current use of PO bisphosphonates for the last 4 years, would benefit from more aggressive therapy  - Start patient on SC Prolia every 6 months, to be given in clinic given insurance >> Patient was okay with this plan  - IF PROLIA IS TOO EXPENSIVE, WILL CONSIDER CONTINUING FOSAMAX AND REPEAT BONE DENSITY NEXT YEAR 2025 FOR MONITORING  - Duration of therapy of at minimum 2 years and side effect profile discussed, given High risk of fracture, outweighs the risk of side effects  - Recommend the patient take sufficient calcium and vitamin D3 OTC  - Check routine lab work: check Renal Panel, vitamin-D regularly   - Fall precautions/Exercise regimen: advised, (weight bearing exercises recommended)  - Routine dental health screening advised, dental cleaning every 6 months.   - Next DXA: 2 years from most current, 10/2025  - Routine follow-up with me every 6 months

## 2024-08-27 NOTE — PROGRESS NOTES
"Subjective:      Patient ID: Darlyn Donato is a 64 y.o. female presented to [facility] on 8/27/2024.  Chief complaint:  Osteopenia      History of Present illness: Darlyn Donato is a 64 y.o. female here for  osteoporosis  Other significant past medical history:  Rheumatoid arthritis - Managed with MTX and folic acid.  Followed by Dr. Hernandez.   Current PCP Amando Prajapati MD    Interval history:  Patient sent by PCP for evaluation of osteoporosis   Patient has been on Fosamax once a week since 2020 or 2021, until now  Currently on multivitamin daily and vitamin D3   No recent falls or fracture   She is interested in switching to more aggressive therapy.        1) Osteoporosis  - Diagnosis on DXA in osteoporosis 2021, started on Fosamax by rheumatologist  - Current medication:  Fosamax once a week started 04/2021 to 8/27/2024  - Vit D intake?/Calcium intake?   - History of falls over the last 2 years: no  - History of fractures to wrist, hip or spine: no  - Compare from 8527-0408 bone density: A +4% improvement in lumbar spine, 1% improvement in total hip  - Calcium in MVN daily, Vit D3 2000 ui daily    Osteoporosis Risk Factor Assessment:  - Family history of osteoporosis: yes, mom  - Family history of fractures of hip: no, mom did fall and broke her foot  - History of loss of height of more than 1 1/2 to 2 inches: no, measured height in clinic: 5'2.6", previously reported 5'2"  - Age of menopause: 40s, total Hysterectomy, No: use of HRT  - Tobacco use, including use in the past:  no, former smoker, stop 16 years  - Patient lives with:  Family at home  - Walking gait:  Unsteady, normal, using cane/walker  - Weight bearing exercise?   no (walking, jogging, strength training)    Medical hx  - Dental work planned? no, NEED sees dentist regularly, routine cleaning  - Chronic kidney disease: no  - History of Hyperparathyrodism, no  - History of kidney stones, 2x episodes, last episode 5 years "   - History of cancer or malignancy, history of malignancy, or prior radiation treatment , no    Recent DXA: Reviewed   Compare from 7828-9693 bone density: A +4% improvement in lumbar spine, 1% improvement in total hip  10/12/2023  Lumbar spine (L1-L4):  BMD is 0.731 g/cm2, T-score is -2.9, and Z-score is -1.3.  Total hip: BMD is 0.787 g/cm2, T-score is -1.3, and Z-score is -0.2.  Femoral neck: BMD is 0.645 g/cm2, T-score is -1.8, and Z-score is -0.5.  Distal 1/3 radius: Not applicable     FRAX:  12% risk of a major osteoporotic fracture in the next 10 years.  1.5% risk of hip fracture in the next 10 years.     Impression:  *Osteoporosis based on T-score below -2.5.  *Fracture risk is high.  *Compared with previous DXA, BMD at the lumbar spine has increased by 4%, and BMD at the total hip has remained stable.    4/01/2021  Lumbar spine (L1-L4): BMD is 0.703 g/cm2, T-score is -3.1, and Z-score is -1.8.  Total hip: BMD is 0.779 g/cm2, T-score is -1.3, and Z-score is -0.4.  Femoral neck: BMD is 0.643 g/cm2, T-score is -1.9, and Z-score is -0.6.     FRAX:  11% risk of a major osteoporotic fracture in the next 10 years.  1.3% risk of hip fracture in the next 10 years.     Impression:  1. Osteoporosis  2. Compared with previous DXA, BMD at the lumbar spine has declined by 20.2%, and the BMD at the total hip has declined by 13.6%.     Lab work reviewed  Lab Results   Component Value Date    PNHGSDWE70XM 54 06/01/2024    TUSHDQAF17GQ 53 05/13/2023    HKLBUODB07DZ 18 (L) 05/14/2022    CALCIUM 9.7 06/01/2024    CALCIUM 9.2 05/13/2023    CALCIUM 9.0 04/14/2023    ALKPHOS 100 06/01/2024    ALKPHOS 125 05/13/2023    ALKPHOS 128 04/14/2023    EGFRNORACEVR >60.0 06/01/2024    ALBUMIN 3.7 06/01/2024       2) Hypothyroidism  - Chronic issue, early 30s  - Family hx of thyroid problem, mother  - stable thyroid function  - on chronic levothyroxine 88 mcg daily    Thyroid lab work  Lab Results   Component Value Date    TSH 1.329  "08/10/2024    TSH 0.314 (L) 06/01/2024    TSH 0.652 05/13/2023    FREET4 1.13 08/10/2024    FREET4 1.25 06/01/2024    FREET4 1.36 07/06/2021    W6MKSCE 76 04/19/2017      Antibodies  No results found for: "THYROPEROXID", "THYROIDAB", "TSIMMGLBN", "THYROIDSTIMI", "THYROTROPINR", "THGABSCRN"    The patient's medications, allergies, past medical, surgical, social and family histories were reviewed and updated as appropriate.  Review of Systems: pertinent positives as per the above HPI, and otherwise negative.    Objective:   /86   Pulse 64   Ht 5' 2.6" (1.59 m)   Wt 70 kg (154 lb 6.4 oz)   BMI 27.70 kg/m²   Body mass index is 27.7 kg/m².  Vital signs reviewed    Physical Exam  Vitals and nursing note reviewed.   Constitutional:       Appearance: Normal appearance. She is well-developed. She is not ill-appearing.   Neck:      Thyroid: No thyromegaly.   Pulmonary:      Effort: Pulmonary effort is normal. No respiratory distress.   Musculoskeletal:         General: Normal range of motion.      Cervical back: Normal range of motion.   Neurological:      General: No focal deficit present.      Mental Status: She is alert. Mental status is at baseline.   Psychiatric:         Mood and Affect: Mood normal.         Behavior: Behavior normal.         Labs reviewed:  See results in subjective  Lab Results   Component Value Date    HGBA1C 5.7 (H) 06/01/2024     Lab Results   Component Value Date    CHOL 195 06/01/2024    HDL 36 (L) 06/01/2024    LDLCALC 132.8 06/01/2024    TRIG 131 06/01/2024    CHOLHDL 18.5 (L) 06/01/2024     Lab Results   Component Value Date     06/01/2024    K 4.2 06/01/2024     06/01/2024    CO2 27 06/01/2024    GLU 90 06/01/2024    BUN 8 06/01/2024    CREATININE 0.8 06/01/2024    CALCIUM 9.7 06/01/2024    PROT 7.2 06/01/2024    ALBUMIN 3.7 06/01/2024    BILITOT 0.4 06/01/2024    ALKPHOS 100 06/01/2024    AST 12 06/01/2024    ALT 17 06/01/2024    ANIONGAP 10 06/01/2024    EGFRNORACEVR " >60.0 06/01/2024    TSH 1.329 08/10/2024    HJSJJYND16OF 54 06/01/2024       Assessment     1. Osteoporosis, unspecified osteoporosis type, unspecified pathological fracture presence  denosumab (PROLIA) 60 mg/mL Syrg    Vitamin D    Renal Function Panel      2. Osteopenia, unspecified location  Ambulatory referral/consult to Endocrinology      3. Vitamin D deficiency disease        4. Acquired hypothyroidism           Plan     Osteoporosis  - Patient is here for evaluation and management of osteoporosis, initial diagnose: 2021  - Independently reviewed: Reviewed 10/12/2023, Compare from 8531-1348 bone density: +4% improvement in lumbar spine, 1% improvement in total hip.  Still with osteoporosis lumbar spine T-score -2.9  - Current medication:  Fosamax once a week started 04/2021 to 8/27/2024  - No: History of falls, history of compression fracture, history of fragility/post menopause fractures  - She is interested in switching to more aggressive therapy.      Plan  - Discuss treatment options:  Given current use of PO bisphosphonates for the last 4 years, would benefit from more aggressive therapy  - Start patient on SC Prolia every 6 months, to be given in clinic given insurance >> Patient was okay with this plan  - IF PROLIA IS TOO EXPENSIVE, WILL CONSIDER CONTINUING FOSAMAX AND REPEAT BONE DENSITY NEXT YEAR 2025 FOR MONITORING  - Duration of therapy of at minimum 2 years and side effect profile discussed, given High risk of fracture, outweighs the risk of side effects  - Recommend the patient take sufficient calcium and vitamin D3 OTC  - Check routine lab work: check Renal Panel, vitamin-D regularly   - Fall precautions/Exercise regimen: advised, (weight bearing exercises recommended)  - Routine dental health screening advised, dental cleaning every 6 months.   - Next DXA: 2 years from most current, 10/2025  - Routine follow-up with me every 6 months    Vitamin D deficiency disease  - Lab work reviewed, and  discussed with patient in clinic  - Vitamin-D goal greater than >30  - on OTC VitD3 2000iu daily  - lab work every 6 mo or yearly     Hypothyroidism  - Chronic issue, early 30s  - stable thyroid function  - on chronic levothyroxine 88 mcg daily  - continue management with PCP       Advised patient to follow up with PCP for routine health maintenance care.   RTC in six months    I spent a total of 62 minutes on the day of the visit.This includes face to face time and non-face to face time preparing to see the patient (eg, review of tests), obtaining and/or reviewing separately obtained history, documenting clinical information in the electronic or other health record, independently interpreting results and communicating results to the patient/family/caregiver, or care coordinator.     Esequiel Alatorre M.D.  Endocrinology  [facility]  8/27/2024      Disclaimer: This note has been generated in part with the use of voice-recognition software. There may be typographical errors that have been missed during proof-reading.

## 2024-08-27 NOTE — ASSESSMENT & PLAN NOTE
- Lab work reviewed, and discussed with patient in clinic  - Vitamin-D goal greater than >30  - on OTC VitD3 2000iu daily  - lab work every 6 mo or yearly

## 2024-08-27 NOTE — PATIENT INSTRUCTIONS
Prolia injection every 6 months, give them a call if you have not heard anything in 1 week    [facility] [phone]

## 2024-09-09 ENCOUNTER — TELEPHONE (OUTPATIENT)
Dept: ENDOCRINOLOGY | Facility: CLINIC | Age: 64
End: 2024-09-09
Payer: COMMERCIAL

## 2024-09-09 NOTE — TELEPHONE ENCOUNTER
----- Message from Esequiel Alatorre MD sent at 9/8/2024  9:38 PM CDT -----  Regarding: FW: Prolia    ----- Message -----  From: Phil Amaya PharmD  Sent: 9/6/2024   3:01 PM CDT  To: Esequiel Alatorre MD  Subject: Prolia                                           Good afternoon Dr. Alatorre,     This patient's Prolia has been approved on her insurance plan for a co-pay of $80.  I spoke with the patient and informed her of this.  She is willing to proceed with taking the Prolia.  I am reaching out to see about getting her scheduled for an appointment with your office to receiving the Prolia injection.  Is it possible to contact her and schedule a date for her to receive this?      Once we know the appointment date, I can set up the  for Prolia to your office.      Thanks,   Phil Amaya, PharmD  [facility]  [phone]

## 2024-09-20 ENCOUNTER — CLINICAL SUPPORT (OUTPATIENT)
Dept: ENDOCRINOLOGY | Facility: CLINIC | Age: 64
End: 2024-09-20
Payer: COMMERCIAL

## 2024-09-20 DIAGNOSIS — M81.0 OSTEOPOROSIS, UNSPECIFIED OSTEOPOROSIS TYPE, UNSPECIFIED PATHOLOGICAL FRACTURE PRESENCE: Primary | ICD-10-CM

## 2024-09-20 PROCEDURE — 99999 PR PBB SHADOW E&M-EST. PATIENT-LVL I: CPT | Mod: PBBFAC,,,

## 2024-09-20 NOTE — PROGRESS NOTES
Patient arrived in clinic to receive prolia injection. Medication received via  and kept refrigerated until administration. Confirmed with patient that she has been taking calcium and vitamin d supplements as prescribed by Dr Alatorre. Labs reviewed, Calcium WNL 9.7 and appropriate for injection. Vital signs stable. Injection administered subcutaneous to left arm. Injection tolerated well. No distress noted. Next injection due 3/20/2024. Lot [id number] expiration 2/28/2027

## 2024-10-09 ENCOUNTER — OFFICE VISIT (OUTPATIENT)
Dept: RHEUMATOLOGY | Facility: CLINIC | Age: 64
End: 2024-10-09
Payer: COMMERCIAL

## 2024-10-09 VITALS
BODY MASS INDEX: 27.17 KG/M2 | SYSTOLIC BLOOD PRESSURE: 112 MMHG | HEART RATE: 80 BPM | WEIGHT: 151.44 LBS | DIASTOLIC BLOOD PRESSURE: 78 MMHG

## 2024-10-09 DIAGNOSIS — M05.79 RHEUMATOID ARTHRITIS INVOLVING MULTIPLE SITES WITH POSITIVE RHEUMATOID FACTOR: Primary | ICD-10-CM

## 2024-10-09 DIAGNOSIS — J43.2 CENTRILOBULAR EMPHYSEMA: ICD-10-CM

## 2024-10-09 DIAGNOSIS — M15.0 PRIMARY OSTEOARTHRITIS INVOLVING MULTIPLE JOINTS: ICD-10-CM

## 2024-10-09 PROCEDURE — 99999 PR PBB SHADOW E&M-EST. PATIENT-LVL III: CPT | Mod: PBBFAC,,, | Performed by: INTERNAL MEDICINE

## 2024-10-09 PROCEDURE — 1159F MED LIST DOCD IN RCRD: CPT | Mod: CPTII,S$GLB,, | Performed by: INTERNAL MEDICINE

## 2024-10-09 PROCEDURE — 1160F RVW MEDS BY RX/DR IN RCRD: CPT | Mod: CPTII,S$GLB,, | Performed by: INTERNAL MEDICINE

## 2024-10-09 PROCEDURE — 3044F HG A1C LEVEL LT 7.0%: CPT | Mod: CPTII,S$GLB,, | Performed by: INTERNAL MEDICINE

## 2024-10-09 PROCEDURE — 3008F BODY MASS INDEX DOCD: CPT | Mod: CPTII,S$GLB,, | Performed by: INTERNAL MEDICINE

## 2024-10-09 PROCEDURE — 3074F SYST BP LT 130 MM HG: CPT | Mod: CPTII,S$GLB,, | Performed by: INTERNAL MEDICINE

## 2024-10-09 PROCEDURE — 3078F DIAST BP <80 MM HG: CPT | Mod: CPTII,S$GLB,, | Performed by: INTERNAL MEDICINE

## 2024-10-09 PROCEDURE — 99214 OFFICE O/P EST MOD 30 MIN: CPT | Mod: S$GLB,,, | Performed by: INTERNAL MEDICINE

## 2024-10-09 RX ORDER — PREDNISONE 5 MG/1
TABLET ORAL
Qty: 60 TABLET | Refills: 2 | Status: SHIPPED | OUTPATIENT
Start: 2024-10-09

## 2024-10-09 RX ORDER — METHOTREXATE 2.5 MG/1
15 TABLET ORAL
Qty: 24 TABLET | Refills: 4 | Status: SHIPPED | OUTPATIENT
Start: 2024-10-09

## 2024-10-09 RX ORDER — FOLIC ACID 1 MG/1
TABLET ORAL
Qty: 30 TABLET | Refills: 11 | Status: SHIPPED | OUTPATIENT
Start: 2024-10-09

## 2024-10-12 ENCOUNTER — LAB VISIT (OUTPATIENT)
Dept: LAB | Facility: HOSPITAL | Age: 64
End: 2024-10-12
Attending: INTERNAL MEDICINE
Payer: COMMERCIAL

## 2024-10-12 DIAGNOSIS — M05.79 RHEUMATOID ARTHRITIS INVOLVING MULTIPLE SITES WITH POSITIVE RHEUMATOID FACTOR: ICD-10-CM

## 2024-10-12 LAB
ALBUMIN SERPL BCP-MCNC: 3.5 G/DL (ref 3.5–5.2)
ALP SERPL-CCNC: 106 U/L (ref 55–135)
ALT SERPL W/O P-5'-P-CCNC: 51 U/L (ref 10–44)
ANION GAP SERPL CALC-SCNC: 11 MMOL/L (ref 8–16)
AST SERPL-CCNC: 17 U/L (ref 10–40)
BASOPHILS # BLD AUTO: 0.04 K/UL (ref 0–0.2)
BASOPHILS NFR BLD: 0.4 % (ref 0–1.9)
BILIRUB SERPL-MCNC: 0.3 MG/DL (ref 0.1–1)
BUN SERPL-MCNC: 7 MG/DL (ref 8–23)
CALCIUM SERPL-MCNC: 9 MG/DL (ref 8.7–10.5)
CHLORIDE SERPL-SCNC: 108 MMOL/L (ref 95–110)
CO2 SERPL-SCNC: 24 MMOL/L (ref 23–29)
CREAT SERPL-MCNC: 0.8 MG/DL (ref 0.5–1.4)
CRP SERPL-MCNC: 9.2 MG/L (ref 0–8.2)
DIFFERENTIAL METHOD BLD: ABNORMAL
EOSINOPHIL # BLD AUTO: 0 K/UL (ref 0–0.5)
EOSINOPHIL NFR BLD: 0.3 % (ref 0–8)
ERYTHROCYTE [DISTWIDTH] IN BLOOD BY AUTOMATED COUNT: 13.1 % (ref 11.5–14.5)
ERYTHROCYTE [SEDIMENTATION RATE] IN BLOOD BY PHOTOMETRIC METHOD: 26 MM/HR (ref 0–36)
EST. GFR  (NO RACE VARIABLE): >60 ML/MIN/1.73 M^2
GLUCOSE SERPL-MCNC: 136 MG/DL (ref 70–110)
HCT VFR BLD AUTO: 42.6 % (ref 37–48.5)
HGB BLD-MCNC: 13.8 G/DL (ref 12–16)
IMM GRANULOCYTES # BLD AUTO: 0.05 K/UL (ref 0–0.04)
IMM GRANULOCYTES NFR BLD AUTO: 0.5 % (ref 0–0.5)
LYMPHOCYTES # BLD AUTO: 1.6 K/UL (ref 1–4.8)
LYMPHOCYTES NFR BLD: 14.8 % (ref 18–48)
MCH RBC QN AUTO: 29.2 PG (ref 27–31)
MCHC RBC AUTO-ENTMCNC: 32.4 G/DL (ref 32–36)
MCV RBC AUTO: 90 FL (ref 82–98)
MONOCYTES # BLD AUTO: 0.3 K/UL (ref 0.3–1)
MONOCYTES NFR BLD: 3 % (ref 4–15)
NEUTROPHILS # BLD AUTO: 8.5 K/UL (ref 1.8–7.7)
NEUTROPHILS NFR BLD: 81 % (ref 38–73)
NRBC BLD-RTO: 0 /100 WBC
PLATELET # BLD AUTO: 359 K/UL (ref 150–450)
PMV BLD AUTO: 10.4 FL (ref 9.2–12.9)
POTASSIUM SERPL-SCNC: 3.2 MMOL/L (ref 3.5–5.1)
PROT SERPL-MCNC: 6.9 G/DL (ref 6–8.4)
RBC # BLD AUTO: 4.73 M/UL (ref 4–5.4)
SODIUM SERPL-SCNC: 143 MMOL/L (ref 136–145)
WBC # BLD AUTO: 10.48 K/UL (ref 3.9–12.7)

## 2024-10-12 PROCEDURE — 86140 C-REACTIVE PROTEIN: CPT | Performed by: INTERNAL MEDICINE

## 2024-10-12 PROCEDURE — 85025 COMPLETE CBC W/AUTO DIFF WBC: CPT | Performed by: INTERNAL MEDICINE

## 2024-10-12 PROCEDURE — 36415 COLL VENOUS BLD VENIPUNCTURE: CPT | Mod: PO | Performed by: INTERNAL MEDICINE

## 2024-10-12 PROCEDURE — 85652 RBC SED RATE AUTOMATED: CPT | Performed by: INTERNAL MEDICINE

## 2024-10-12 PROCEDURE — 80053 COMPREHEN METABOLIC PANEL: CPT | Performed by: INTERNAL MEDICINE

## 2024-10-13 NOTE — PROGRESS NOTES
History of present illness:  64-year-old female with chronic low back pain.  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  I diagnosed her as having rheumatoid arthritis.  I placed her on methotrexate 15 mg weekly.  She was followed by Dr. Knapp during my absence.  She was changed to injectable methotrexate and is on 25 mg weekly.  She was also placed on sulfasalazine but she did not tolerate it.  She has macular degeneration so she was not considered a candidate for Plaquenil.  She was last seen 1 year ago.  She had run out of her methotrexate.  She had no evidence of active disease.  I elected not to place her back on methotrexate at that time.    She has been doing worse for the past 4 months.  She had no history of antecedent URI or vaccine.  She has had increased pain in her hands, feet, elbow, and knee.  She is also developing more nodules.  She has had some swelling in the joints.  She has been taking Tylenol and Advil with some relief.  She has been using ice.  She had taken Voltaren with no response.    She has had some stomach problems since Thursday.  She has had some diarrhea.  She denies any fever.  She was started on Prolia for osteoporosis.    Physical examination:   Musculoskeletal: Cervical spine is unremarkable.    She has tenderness of the left acromioclavicular joint.  She may have some mild swelling.  She has swelling in the left olecranon bursa.  She may have a small nodule.  Right elbows unremarkable.  Wrists are within normal limits.    She has tenderness of the left 1st IP joint and some swelling.  She has synovitis of the right 3rd and 5th PIP.  Hips and knees are unremarkable.    She has tenderness in the ankle bilaterally.  She has soft tissue swelling of the MCPs.    She seems to be having a flare of her rheumatoid arthritis     Plans:   1.  Laboratory studies obtained  2. I placed her on prednisone initially 20 mg daily for 5  days 10 mg daily for 5 days and then 5 mg daily until her next visit   3.  She is to resume methotrexate 15 mg weekly.  She is also to resume folic acid 1 mg daily.    4.  Return in 2 months

## 2024-10-15 ENCOUNTER — LAB VISIT (OUTPATIENT)
Dept: LAB | Facility: HOSPITAL | Age: 64
End: 2024-10-15
Attending: INTERNAL MEDICINE
Payer: COMMERCIAL

## 2024-10-15 DIAGNOSIS — M05.79 RHEUMATOID ARTHRITIS INVOLVING MULTIPLE SITES WITH POSITIVE RHEUMATOID FACTOR: ICD-10-CM

## 2024-10-15 PROCEDURE — 86480 TB TEST CELL IMMUN MEASURE: CPT | Performed by: INTERNAL MEDICINE

## 2024-10-16 LAB
GAMMA INTERFERON BACKGROUND BLD IA-ACNC: 0.01 IU/ML
M TB IFN-G CD4+ BCKGRND COR BLD-ACNC: 0.01 IU/ML
M TB IFN-G CD4+ BCKGRND COR BLD-ACNC: 0.02 IU/ML
MITOGEN IGNF BCKGRD COR BLD-ACNC: 9.99 IU/ML
TB GOLD PLUS: NEGATIVE

## 2024-12-04 ENCOUNTER — OFFICE VISIT (OUTPATIENT)
Dept: RHEUMATOLOGY | Facility: CLINIC | Age: 64
End: 2024-12-04
Payer: COMMERCIAL

## 2024-12-04 ENCOUNTER — LAB VISIT (OUTPATIENT)
Dept: LAB | Facility: HOSPITAL | Age: 64
End: 2024-12-04
Attending: INTERNAL MEDICINE
Payer: COMMERCIAL

## 2024-12-04 VITALS
WEIGHT: 154.31 LBS | DIASTOLIC BLOOD PRESSURE: 86 MMHG | HEIGHT: 62 IN | HEART RATE: 79 BPM | BODY MASS INDEX: 28.39 KG/M2 | SYSTOLIC BLOOD PRESSURE: 124 MMHG

## 2024-12-04 DIAGNOSIS — M05.79 RHEUMATOID ARTHRITIS INVOLVING MULTIPLE SITES WITH POSITIVE RHEUMATOID FACTOR: ICD-10-CM

## 2024-12-04 DIAGNOSIS — Z79.60 LONG-TERM USE OF IMMUNOSUPPRESSANT MEDICATION: ICD-10-CM

## 2024-12-04 DIAGNOSIS — M05.79 RHEUMATOID ARTHRITIS INVOLVING MULTIPLE SITES WITH POSITIVE RHEUMATOID FACTOR: Primary | ICD-10-CM

## 2024-12-04 LAB
ALBUMIN SERPL BCP-MCNC: 3.7 G/DL (ref 3.5–5.2)
ALP SERPL-CCNC: 105 U/L (ref 40–150)
ALT SERPL W/O P-5'-P-CCNC: 17 U/L (ref 10–44)
ANION GAP SERPL CALC-SCNC: 9 MMOL/L (ref 8–16)
AST SERPL-CCNC: 13 U/L (ref 10–40)
BASOPHILS # BLD AUTO: 0.07 K/UL (ref 0–0.2)
BASOPHILS NFR BLD: 0.6 % (ref 0–1.9)
BILIRUB SERPL-MCNC: 0.2 MG/DL (ref 0.1–1)
BUN SERPL-MCNC: 11 MG/DL (ref 8–23)
CALCIUM SERPL-MCNC: 9 MG/DL (ref 8.7–10.5)
CHLORIDE SERPL-SCNC: 107 MMOL/L (ref 95–110)
CO2 SERPL-SCNC: 27 MMOL/L (ref 23–29)
CREAT SERPL-MCNC: 0.8 MG/DL (ref 0.5–1.4)
CRP SERPL-MCNC: 9.4 MG/L (ref 0–8.2)
DIFFERENTIAL METHOD BLD: ABNORMAL
EOSINOPHIL # BLD AUTO: 0.1 K/UL (ref 0–0.5)
EOSINOPHIL NFR BLD: 0.9 % (ref 0–8)
ERYTHROCYTE [DISTWIDTH] IN BLOOD BY AUTOMATED COUNT: 15.4 % (ref 11.5–14.5)
ERYTHROCYTE [SEDIMENTATION RATE] IN BLOOD BY PHOTOMETRIC METHOD: 24 MM/HR (ref 0–36)
EST. GFR  (NO RACE VARIABLE): >60 ML/MIN/1.73 M^2
GLUCOSE SERPL-MCNC: 98 MG/DL (ref 70–110)
HCT VFR BLD AUTO: 43.7 % (ref 37–48.5)
HGB BLD-MCNC: 13.9 G/DL (ref 12–16)
IMM GRANULOCYTES # BLD AUTO: 0.06 K/UL (ref 0–0.04)
IMM GRANULOCYTES NFR BLD AUTO: 0.5 % (ref 0–0.5)
LYMPHOCYTES # BLD AUTO: 2.3 K/UL (ref 1–4.8)
LYMPHOCYTES NFR BLD: 20 % (ref 18–48)
MCH RBC QN AUTO: 30 PG (ref 27–31)
MCHC RBC AUTO-ENTMCNC: 31.8 G/DL (ref 32–36)
MCV RBC AUTO: 94 FL (ref 82–98)
MONOCYTES # BLD AUTO: 0.7 K/UL (ref 0.3–1)
MONOCYTES NFR BLD: 6.1 % (ref 4–15)
NEUTROPHILS # BLD AUTO: 8.4 K/UL (ref 1.8–7.7)
NEUTROPHILS NFR BLD: 71.9 % (ref 38–73)
NRBC BLD-RTO: 0 /100 WBC
PLATELET # BLD AUTO: 369 K/UL (ref 150–450)
PMV BLD AUTO: 9.8 FL (ref 9.2–12.9)
POTASSIUM SERPL-SCNC: 3.9 MMOL/L (ref 3.5–5.1)
PROT SERPL-MCNC: 7.3 G/DL (ref 6–8.4)
RBC # BLD AUTO: 4.63 M/UL (ref 4–5.4)
SODIUM SERPL-SCNC: 143 MMOL/L (ref 136–145)
WBC # BLD AUTO: 11.71 K/UL (ref 3.9–12.7)

## 2024-12-04 PROCEDURE — 85025 COMPLETE CBC W/AUTO DIFF WBC: CPT | Performed by: INTERNAL MEDICINE

## 2024-12-04 PROCEDURE — 86140 C-REACTIVE PROTEIN: CPT | Performed by: INTERNAL MEDICINE

## 2024-12-04 PROCEDURE — 3044F HG A1C LEVEL LT 7.0%: CPT | Mod: CPTII,S$GLB,, | Performed by: INTERNAL MEDICINE

## 2024-12-04 PROCEDURE — 3074F SYST BP LT 130 MM HG: CPT | Mod: CPTII,S$GLB,, | Performed by: INTERNAL MEDICINE

## 2024-12-04 PROCEDURE — 80053 COMPREHEN METABOLIC PANEL: CPT | Performed by: INTERNAL MEDICINE

## 2024-12-04 PROCEDURE — 99214 OFFICE O/P EST MOD 30 MIN: CPT | Mod: S$GLB,,, | Performed by: INTERNAL MEDICINE

## 2024-12-04 PROCEDURE — 36415 COLL VENOUS BLD VENIPUNCTURE: CPT | Performed by: INTERNAL MEDICINE

## 2024-12-04 PROCEDURE — 3008F BODY MASS INDEX DOCD: CPT | Mod: CPTII,S$GLB,, | Performed by: INTERNAL MEDICINE

## 2024-12-04 PROCEDURE — 85652 RBC SED RATE AUTOMATED: CPT | Performed by: INTERNAL MEDICINE

## 2024-12-04 PROCEDURE — 1159F MED LIST DOCD IN RCRD: CPT | Mod: CPTII,S$GLB,, | Performed by: INTERNAL MEDICINE

## 2024-12-04 PROCEDURE — 3079F DIAST BP 80-89 MM HG: CPT | Mod: CPTII,S$GLB,, | Performed by: INTERNAL MEDICINE

## 2024-12-04 PROCEDURE — 1160F RVW MEDS BY RX/DR IN RCRD: CPT | Mod: CPTII,S$GLB,, | Performed by: INTERNAL MEDICINE

## 2024-12-04 PROCEDURE — 99999 PR PBB SHADOW E&M-EST. PATIENT-LVL IV: CPT | Mod: PBBFAC,,, | Performed by: INTERNAL MEDICINE

## 2024-12-08 NOTE — PROGRESS NOTES
History of present illness:  64-year-old female with chronic low back pain.  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  I diagnosed her as having rheumatoid arthritis.  I placed her on methotrexate 15 mg weekly.  She was followed by Dr. Knapp during my absence.  She was changed to injectable methotrexate and is on 25 mg weekly.  She was also placed on sulfasalazine but she did not tolerate it.  She has macular degeneration so she was not considered a candidate for Plaquenil.  She was last seen 1 year ago.  She had run out of her methotrexate.  She had no evidence of active disease.  I elected not to place her back on methotrexate at that time.     She was last seen in October.  She is doing worse at that time.  She has evidence of active synovitis.  I placed her on prednisone, initially 20 mg daily, with rapid taper.  I placed her back on methotrexate.    She is doing much better since her last visit.  She is tolerating the methotrexate.  She has had less swelling.  She has had no other recent medical problems.  She is presently on prednisone 5 mg daily    Physical examination:  Musculoskeletal: Full range of motion of all joints.  No synovitis.  Assessment: Improved rheumatoid arthritis     Plans:   1.  Decrease prednisone to 2.5 mg daily for 1 month and if she is stable she can stop the prednisone   2.  Continue methotrexate as before  3.  Laboratory studies obtained   4. Return in 4 months

## 2024-12-11 DIAGNOSIS — Z12.31 OTHER SCREENING MAMMOGRAM: ICD-10-CM

## 2025-02-10 ENCOUNTER — TELEPHONE (OUTPATIENT)
Dept: PULMONOLOGY | Facility: CLINIC | Age: 65
End: 2025-02-10
Payer: COMMERCIAL

## 2025-02-10 NOTE — TELEPHONE ENCOUNTER
Spoke with patient, informed her that I have received her message. I advised pt that she does not have any testing  to complete prior to her appointment. Pt verbalized that she understand.

## 2025-02-10 NOTE — TELEPHONE ENCOUNTER
----- Message from Bhavna sent at 2/10/2025  2:44 PM CST -----  Regarding: CT Orders  Contact: [phone]  Type:  Needs Medical Advice    Who Called: Darlyn  Would the patient rather a call back or a response via [facility]? Call/portal  Best Call Back Number: [phone]  Additional Information: Calling to speak with nurse to determine if any further testing is needed prior to appointment/procedure. Please call to discuss as soon as possible with patient  or send a message to patient portal.

## 2025-02-11 ENCOUNTER — HOSPITAL ENCOUNTER (OUTPATIENT)
Dept: RADIOLOGY | Facility: HOSPITAL | Age: 65
Discharge: HOME | End: 2025-02-11
Attending: INTERNAL MEDICINE
Payer: COMMERCIAL

## 2025-02-11 DIAGNOSIS — Z12.31 OTHER SCREENING MAMMOGRAM: ICD-10-CM

## 2025-02-11 PROCEDURE — 77067 SCR MAMMO BI INCL CAD: CPT | Mod: 26,,, | Performed by: RADIOLOGY

## 2025-02-11 PROCEDURE — 77063 BREAST TOMOSYNTHESIS BI: CPT | Mod: TC,PO

## 2025-02-11 PROCEDURE — 77063 BREAST TOMOSYNTHESIS BI: CPT | Mod: 26,,, | Performed by: RADIOLOGY

## 2025-02-18 ENCOUNTER — RESULTS FOLLOW-UP (OUTPATIENT)
Dept: FAMILY MEDICINE | Facility: CLINIC | Age: 65
End: 2025-02-18
Payer: COMMERCIAL

## 2025-02-18 DIAGNOSIS — R92.8 ABNORMAL MAMMOGRAM OF RIGHT BREAST: Primary | ICD-10-CM

## 2025-02-19 ENCOUNTER — PATIENT MESSAGE (OUTPATIENT)
Dept: RHEUMATOLOGY | Facility: CLINIC | Age: 65
End: 2025-02-19
Payer: COMMERCIAL

## 2025-02-19 DIAGNOSIS — M05.79 RHEUMATOID ARTHRITIS INVOLVING MULTIPLE SITES WITH POSITIVE RHEUMATOID FACTOR: ICD-10-CM

## 2025-02-19 RX ORDER — METHOTREXATE 2.5 MG/1
15 TABLET ORAL
Qty: 24 TABLET | Refills: 4 | Status: SHIPPED | OUTPATIENT
Start: 2025-02-19

## 2025-02-19 NOTE — TELEPHONE ENCOUNTER
----- Message from Amando Prajapati MD sent at 2/18/2025  4:30 PM CST -----  Call to schedule diagnostic mammo and right breast u/s to follow up asymmetry on screening mammo  ----- Message -----  From: Marilyn Nicholas MD  Sent: 2/12/2025   3:44 PM CST  To: Amando Prajapati MD

## 2025-02-20 ENCOUNTER — PATIENT MESSAGE (OUTPATIENT)
Dept: RHEUMATOLOGY | Facility: CLINIC | Age: 65
End: 2025-02-20
Payer: COMMERCIAL

## 2025-03-10 ENCOUNTER — HOSPITAL ENCOUNTER (OUTPATIENT)
Dept: RADIOLOGY | Facility: HOSPITAL | Age: 65
Discharge: HOME | End: 2025-03-10
Attending: INTERNAL MEDICINE
Payer: COMMERCIAL

## 2025-03-10 ENCOUNTER — RESULTS FOLLOW-UP (OUTPATIENT)
Dept: FAMILY MEDICINE | Facility: CLINIC | Age: 65
End: 2025-03-10

## 2025-03-10 DIAGNOSIS — R92.8 ABNORMAL MAMMOGRAM OF RIGHT BREAST: ICD-10-CM

## 2025-03-10 PROCEDURE — 77065 DX MAMMO INCL CAD UNI: CPT | Mod: 26,RT,, | Performed by: RADIOLOGY

## 2025-03-10 PROCEDURE — 77061 BREAST TOMOSYNTHESIS UNI: CPT | Mod: TC,RT

## 2025-03-10 PROCEDURE — 77061 BREAST TOMOSYNTHESIS UNI: CPT | Mod: 26,RT,, | Performed by: RADIOLOGY

## 2025-03-11 ENCOUNTER — TELEPHONE (OUTPATIENT)
Dept: ENDOCRINOLOGY | Facility: CLINIC | Age: 65
End: 2025-03-11
Payer: COMMERCIAL

## 2025-03-11 DIAGNOSIS — M81.0 OSTEOPOROSIS, UNSPECIFIED OSTEOPOROSIS TYPE, UNSPECIFIED PATHOLOGICAL FRACTURE PRESENCE: Primary | ICD-10-CM

## 2025-03-11 DIAGNOSIS — E03.9 ACQUIRED HYPOTHYROIDISM: ICD-10-CM

## 2025-03-11 DIAGNOSIS — E55.9 VITAMIN D DEFICIENCY DISEASE: ICD-10-CM

## 2025-03-11 NOTE — TELEPHONE ENCOUNTER
----- Message from Pharmacist Phil sent at 3/11/2025  2:00 PM CDT -----  Regarding: Prolia injection  Good afternoon Dr. Alatorre,We spoke with this patient regarding her Prolia injection that is due this month.  She informed us that she previously had to re-scheduled her injection appointment due to her  having a procedure performed.  Her appointment as of today is scheduled for 06/23/25.  Her Prolia injection should be due around 03/20/25.  If she was to wait until 06/23/25, she would be about 3 months late on her injection.  Would it be possible to have her Prolia injection scheduled sometime this month for her?Thanks, Phil Amaya, PharmDO[phone]

## 2025-03-16 DIAGNOSIS — J43.2 CENTRILOBULAR EMPHYSEMA: ICD-10-CM

## 2025-03-24 ENCOUNTER — OFFICE VISIT (OUTPATIENT)
Dept: PULMONOLOGY | Facility: CLINIC | Age: 65
End: 2025-03-24
Payer: COMMERCIAL

## 2025-03-24 VITALS
HEART RATE: 72 BPM | HEIGHT: 62 IN | DIASTOLIC BLOOD PRESSURE: 82 MMHG | OXYGEN SATURATION: 96 % | BODY MASS INDEX: 29.05 KG/M2 | WEIGHT: 157.88 LBS | SYSTOLIC BLOOD PRESSURE: 124 MMHG

## 2025-03-24 DIAGNOSIS — J43.2 CENTRILOBULAR EMPHYSEMA: ICD-10-CM

## 2025-03-24 PROBLEM — R05.9 COUGH: Status: RESOLVED | Noted: 2018-02-16 | Resolved: 2025-03-24

## 2025-03-24 PROCEDURE — 3074F SYST BP LT 130 MM HG: CPT | Mod: CPTII,S$GLB,, | Performed by: INTERNAL MEDICINE

## 2025-03-24 PROCEDURE — 3008F BODY MASS INDEX DOCD: CPT | Mod: CPTII,S$GLB,, | Performed by: INTERNAL MEDICINE

## 2025-03-24 PROCEDURE — 99999 PR PBB SHADOW E&M-EST. PATIENT-LVL III: CPT | Mod: PBBFAC,,, | Performed by: INTERNAL MEDICINE

## 2025-03-24 PROCEDURE — 3079F DIAST BP 80-89 MM HG: CPT | Mod: CPTII,S$GLB,, | Performed by: INTERNAL MEDICINE

## 2025-03-24 PROCEDURE — 1159F MED LIST DOCD IN RCRD: CPT | Mod: CPTII,S$GLB,, | Performed by: INTERNAL MEDICINE

## 2025-03-24 PROCEDURE — 99213 OFFICE O/P EST LOW 20 MIN: CPT | Mod: S$GLB,,, | Performed by: INTERNAL MEDICINE

## 2025-03-24 NOTE — ASSESSMENT & PLAN NOTE
Improvement in symptoms since starting Trelegy. Continue PRN use of albuterol. Has had to use less often. Discussed increasing activity level to maintain/improve conditioning.

## 2025-03-24 NOTE — PROGRESS NOTES
Subjective:      Patient ID: Darlyn Donato is a 65 y.o. female.    Chief Complaint: Follow-up, Medication Refill, Wheezing, Cough, Shortness of Breath, and COPD    Pt is a 63 yo CW pmh RA previously on MTX, former tobacco use disorder, macular degeneration presenting for evaluation of COPD. Cold last year.     Initial evaluation:   Persistent cough and went to urgent care and was started on cough medications and inhaler. Has since been placed on Breo. Had good relief from shortness of breath and improvement of cough.   Continues to have cough, but much improved. No post nasal drip. Has significant heart burn at night when going to bed.       Having severe pain in hands, fingers and feet due to RA.      Smoking hx: quit 16 years ago, started as teenager [place], 30 years 2-3 ppd.    Work hx: book keeper  Exposure hx: no chem/fumes, no pets, pillows with feathers (has pillow covers)  Inhaler use: Trelegy  PRN inhaler use: albuterol- at least 1x/week  Hx of lung dz: COPD  Family hx of lung dz: Father- emphysema  Activity level: shortness of breath with stairs, cleaning house.   No history of hospitalizations due to breathing.     Cough continues, but improved. Random coughing fits, usually worse with increased activity. Mucous production is less. Mucinex makes drowsy. MTX and weaned off prednisone. No hospital visit, urgent care or use of prednisone    Review of Systems   Respiratory:  Positive for cough, sputum production, dyspnea on extertion and use of rescue inhaler. Negative for hemoptysis, chest tightness, shortness of breath, wheezing and previous hospitialization due to pulmonary problems.    Cardiovascular:  Negative for chest pain, palpitations and leg swelling.   Musculoskeletal:  Positive for arthralgias (hands, knuckles, fingers, toes, bottom of feet).   Skin:  Negative for rash.   Neurological:  Negative for dizziness, syncope and light-headedness.   Psychiatric/Behavioral:  Negative for confusion and  "sleep disturbance. The patient is not nervous/anxious.      Objective:     Physical Exam   Constitutional: She is oriented to person, place, and time. She appears well-developed. She is not obese.   HENT:   Head: Normocephalic.   Cardiovascular: Normal rate, regular rhythm and normal heart sounds.   Pulmonary/Chest: Normal expansion, symmetric chest wall expansion and effort normal. She has no wheezes. She has no rhonchi. She has no rales.   Musculoskeletal:         General: No edema.      Comments: Right 5th finger swelling. Proximal hand joint pain.    Neurological: She is alert and oriented to person, place, and time. Gait normal.   Skin: No cyanosis. Nails show no clubbing.   Psychiatric: She has a normal mood and affect. Her behavior is normal. Judgment and thought content normal.     Personal Diagnostic Review    CT of chest performed on 24 without contrast revealed bilateral upper lobe predominant centrilobular emphysema. Rml opacity noted on previous CT renal stone 2020. Micronodules located in lingula.     Echocardiogram: none     Pulmonary function tests:   24  FEV1: 1.36L  (60.2 % predicted),   FVC:  2.75L (96.3 % predicted),   FEV1/FVC:  49,   T.21L (92.1 % predicted),   DLCO: 9.90 (46.9 % predicted)        2024     3:46 PM 10/9/2024     4:46 PM 2024     9:43 AM 2024     9:21 AM 2024     8:31 AM 2024     3:02 PM 2024     3:45 PM   Pulmonary Function Tests   SpO2    97 % 98 % 92 % 93 %   Height 5' 2" (1.575 m)  5' 2.6" (1.59 m) 5' 2" (1.575 m)  5' 2" (1.575 m) 5' 2" (1.575 m)   Weight 70 kg (154 lb 5.2 oz) 68.7 kg (151 lb 7.3 oz) 70 kg (154 lb 6.4 oz) 70.2 kg (154 lb 12.2 oz)  71.8 kg (158 lb 4.6 oz) 70.4 kg (155 lb 3.3 oz)   BMI (Calculated) 28.2  27.7 28.3  28.9 28.4     Assessment:     1. Centrilobular emphysema      Encounter Medications[1]    No orders of the defined types were placed in this encounter.    Plan:     1. Centrilobular emphysema  Assessment & " Plan:  Improvement in symptoms since starting Trelegy. Continue PRN use of albuterol. Has had to use less often. Discussed increasing activity level to maintain/improve conditioning.     Orders:  -     fluticasone-umeclidin-vilanter (TRELEGY ELLIPTA) 200-62.5-25 mcg inhaler; Inhale 1 puff into the lungs once daily.  Dispense: 60 each; Refill: 6    Follow up in 6 months to a year.     David Carrasco MD   [facility]       [1]   Outpatient Encounter Medications as of 3/24/2025   Medication Sig Dispense Refill    albuterol (PROAIR HFA) 90 mcg/actuation inhaler Inhale 2 puffs into the lungs every 4 (four) hours as needed for Wheezing or Shortness of Breath. 2 HFA Aerosol Inhaler Inhalation Every 4-6 hours (Patient not taking: Reported on 12/4/2024) 54 g 3    cholecalciferol, vitamin D3, (VITAMIN D3) 50 mcg (2,000 unit) Tab Take 1 tablet (2,000 Units total) by mouth once daily. (Patient not taking: Reported on 12/4/2024) 90 tablet 1    cholecalciferol, vitamin D3, (VITAMIN D3) 50 mcg (2,000 unit) Tab Take 1 tablet (2,000 Units total) by mouth once daily. (Patient not taking: Reported on 12/4/2024) 90 tablet 1    denosumab (PROLIA) 60 mg/mL Syrg Inject 1 mL (60 mg total) into the skin every 6 (six) months. 2 mL 0    diclofenac sodium (VOLTAREN) 1 % Gel Apply 2 g topically 3 (three) times daily. 1 Tube 2    fluticasone-umeclidin-vilanter (TRELEGY ELLIPTA) 200-62.5-25 mcg inhaler Inhale 1 puff into the lungs once daily. 60 each 6    folic acid (FOLVITE) 1 MG tablet TAKE 1 TABLET(1 MG) BY MOUTH EVERY DAY 30 tablet 11    levothyroxine (SYNTHROID) 88 MCG tablet Take 1 tablet (88 mcg total) by mouth before breakfast. 30 tablet 11    methotrexate 2.5 MG Tab Take 6 tablets (15 mg total) by mouth every 7 days. 24 tablet 4    multivit-min/ferrous gluconate (MULTI-DONNIE ORAL) Take by mouth.      predniSONE (DELTASONE) 5 MG tablet 2 twice daily for 5 days, 2 in morning for 5 days, then 1 daily 60 tablet 2    promethazine-dextromethorphan  (PROMETHAZINE-DM) 6.25-15 mg/5 mL Syrp Take 5 mLs by mouth nightly as needed (cough). (Patient not taking: Reported on 12/4/2024) 118 mL 0    [DISCONTINUED] denosumab (PROLIA) 60 mg/mL Syrg Inject 1 mL (60 mg total) into the skin every 6 (six) months. 2 mL 0    [DISCONTINUED] fluticasone-umeclidin-vilanter (TRELEGY ELLIPTA) 200-62.5-25 mcg inhaler Inhale 1 puff into the lungs once daily. 60 each 6    [DISCONTINUED] tamsulosin (FLOMAX) 0.4 mg Cap Take 1 capsule (0.4 mg total) by mouth once daily. for 10 days 10 capsule 0     No facility-administered encounter medications on file as of 3/24/2025.

## 2025-03-26 ENCOUNTER — CLINICAL SUPPORT (OUTPATIENT)
Dept: ENDOCRINOLOGY | Facility: CLINIC | Age: 65
End: 2025-03-26
Payer: COMMERCIAL

## 2025-03-26 DIAGNOSIS — M81.0 OSTEOPOROSIS, UNSPECIFIED OSTEOPOROSIS TYPE, UNSPECIFIED PATHOLOGICAL FRACTURE PRESENCE: Primary | ICD-10-CM

## 2025-03-26 NOTE — PROGRESS NOTES
Patient arrived in clinic to receive prolia injection. Medication received via  and kept refrigerated until administration. Confirmed with patient that she has been taking calcium and vitamin d supplements as prescribed by Dr Alatorre. Labs reviewed, Calcium WNL 9.0 and appropriate for injection. Vital signs stable. Injection administered subcutaneous to left arm. Injection tolerated well. No distress noted. Next injection due 09/26/2025. Lot [id number] expiration 07/31/2027.

## 2025-04-11 ENCOUNTER — PATIENT MESSAGE (OUTPATIENT)
Dept: RHEUMATOLOGY | Facility: CLINIC | Age: 65
End: 2025-04-11
Payer: COMMERCIAL

## 2025-04-26 ENCOUNTER — LAB VISIT (OUTPATIENT)
Dept: LAB | Facility: HOSPITAL | Age: 65
End: 2025-04-26
Attending: INTERNAL MEDICINE
Payer: COMMERCIAL

## 2025-04-26 DIAGNOSIS — M05.79 RHEUMATOID ARTHRITIS INVOLVING MULTIPLE SITES WITH POSITIVE RHEUMATOID FACTOR: ICD-10-CM

## 2025-04-26 DIAGNOSIS — Z79.60 LONG-TERM USE OF IMMUNOSUPPRESSANT MEDICATION: ICD-10-CM

## 2025-04-26 LAB
ABSOLUTE EOSINOPHIL (OHS): 0.21 K/UL
ABSOLUTE MONOCYTE (OHS): 0.65 K/UL (ref 0.3–1)
ABSOLUTE NEUTROPHIL COUNT (OHS): 5.5 K/UL (ref 1.8–7.7)
ALBUMIN SERPL BCP-MCNC: 3.5 G/DL (ref 3.5–5.2)
ALP SERPL-CCNC: 94 UNIT/L (ref 40–150)
ALT SERPL W/O P-5'-P-CCNC: 17 UNIT/L (ref 10–44)
ANION GAP (OHS): 12 MMOL/L (ref 8–16)
AST SERPL-CCNC: 12 UNIT/L (ref 11–45)
BASOPHILS # BLD AUTO: 0.05 K/UL
BASOPHILS NFR BLD AUTO: 0.6 %
BILIRUB SERPL-MCNC: 0.5 MG/DL (ref 0.1–1)
BUN SERPL-MCNC: 10 MG/DL (ref 8–23)
CALCIUM SERPL-MCNC: 8.7 MG/DL (ref 8.7–10.5)
CHLORIDE SERPL-SCNC: 106 MMOL/L (ref 95–110)
CO2 SERPL-SCNC: 25 MMOL/L (ref 23–29)
CREAT SERPL-MCNC: 0.8 MG/DL (ref 0.5–1.4)
CRP SERPL-MCNC: 9.4 MG/L
ERYTHROCYTE [DISTWIDTH] IN BLOOD BY AUTOMATED COUNT: 13.8 % (ref 11.5–14.5)
ERYTHROCYTE [SEDIMENTATION RATE] IN BLOOD BY PHOTOMETRIC METHOD: 27 MM/HR
GFR SERPLBLD CREATININE-BSD FMLA CKD-EPI: >60 ML/MIN/1.73/M2
GLUCOSE SERPL-MCNC: 101 MG/DL (ref 70–110)
HCT VFR BLD AUTO: 43.7 % (ref 37–48.5)
HGB BLD-MCNC: 13.3 GM/DL (ref 12–16)
IMM GRANULOCYTES # BLD AUTO: 0.02 K/UL (ref 0–0.04)
IMM GRANULOCYTES NFR BLD AUTO: 0.2 % (ref 0–0.5)
LYMPHOCYTES # BLD AUTO: 1.94 K/UL (ref 1–4.8)
MCH RBC QN AUTO: 29.5 PG (ref 27–31)
MCHC RBC AUTO-ENTMCNC: 30.4 G/DL (ref 32–36)
MCV RBC AUTO: 97 FL (ref 82–98)
NUCLEATED RBC (/100WBC) (OHS): 0 /100 WBC
PLATELET # BLD AUTO: 333 K/UL (ref 150–450)
PMV BLD AUTO: 10 FL (ref 9.2–12.9)
POTASSIUM SERPL-SCNC: 4.1 MMOL/L (ref 3.5–5.1)
PROT SERPL-MCNC: 6.7 GM/DL (ref 6–8.4)
RBC # BLD AUTO: 4.51 M/UL (ref 4–5.4)
RELATIVE EOSINOPHIL (OHS): 2.5 %
RELATIVE LYMPHOCYTE (OHS): 23.2 % (ref 18–48)
RELATIVE MONOCYTE (OHS): 7.8 % (ref 4–15)
RELATIVE NEUTROPHIL (OHS): 65.7 % (ref 38–73)
SODIUM SERPL-SCNC: 143 MMOL/L (ref 136–145)
WBC # BLD AUTO: 8.37 K/UL (ref 3.9–12.7)

## 2025-04-26 PROCEDURE — 85025 COMPLETE CBC W/AUTO DIFF WBC: CPT

## 2025-04-26 PROCEDURE — 85652 RBC SED RATE AUTOMATED: CPT

## 2025-04-26 PROCEDURE — 86140 C-REACTIVE PROTEIN: CPT

## 2025-04-26 PROCEDURE — 36415 COLL VENOUS BLD VENIPUNCTURE: CPT | Mod: PO

## 2025-04-26 PROCEDURE — 80053 COMPREHEN METABOLIC PANEL: CPT

## 2025-04-27 ENCOUNTER — RESULTS FOLLOW-UP (OUTPATIENT)
Dept: RHEUMATOLOGY | Facility: CLINIC | Age: 65
End: 2025-04-27

## 2025-05-13 ENCOUNTER — OFFICE VISIT (OUTPATIENT)
Dept: FAMILY MEDICINE | Facility: CLINIC | Age: 65
End: 2025-05-13
Payer: COMMERCIAL

## 2025-05-13 VITALS
BODY MASS INDEX: 28.56 KG/M2 | SYSTOLIC BLOOD PRESSURE: 108 MMHG | HEIGHT: 62 IN | RESPIRATION RATE: 17 BRPM | DIASTOLIC BLOOD PRESSURE: 72 MMHG | OXYGEN SATURATION: 97 % | WEIGHT: 155.19 LBS | HEART RATE: 74 BPM

## 2025-05-13 DIAGNOSIS — Z12.2 SCREENING FOR LUNG CANCER: ICD-10-CM

## 2025-05-13 DIAGNOSIS — Z87.891 FORMER SMOKER: ICD-10-CM

## 2025-05-13 DIAGNOSIS — M05.79 RHEUMATOID ARTHRITIS INVOLVING MULTIPLE SITES WITH POSITIVE RHEUMATOID FACTOR: ICD-10-CM

## 2025-05-13 DIAGNOSIS — E03.9 ACQUIRED HYPOTHYROIDISM: ICD-10-CM

## 2025-05-13 DIAGNOSIS — R73.03 PREDIABETES: ICD-10-CM

## 2025-05-13 DIAGNOSIS — Z29.11 NEED FOR RSV VACCINATION: ICD-10-CM

## 2025-05-13 DIAGNOSIS — Z00.00 HEALTHCARE MAINTENANCE: ICD-10-CM

## 2025-05-13 DIAGNOSIS — M15.0 PRIMARY OSTEOARTHRITIS INVOLVING MULTIPLE JOINTS: ICD-10-CM

## 2025-05-13 DIAGNOSIS — J43.2 CENTRILOBULAR EMPHYSEMA: ICD-10-CM

## 2025-05-13 DIAGNOSIS — Z00.00 ANNUAL PHYSICAL EXAM: Primary | ICD-10-CM

## 2025-05-13 DIAGNOSIS — E78.5 HYPERLIPIDEMIA, UNSPECIFIED HYPERLIPIDEMIA TYPE: ICD-10-CM

## 2025-05-13 DIAGNOSIS — E55.9 VITAMIN D DEFICIENCY DISEASE: ICD-10-CM

## 2025-05-13 DIAGNOSIS — D84.9 IMMUNOSUPPRESSION: ICD-10-CM

## 2025-05-13 DIAGNOSIS — M81.0 AGE RELATED OSTEOPOROSIS, UNSPECIFIED PATHOLOGICAL FRACTURE PRESENCE: ICD-10-CM

## 2025-05-13 PROCEDURE — 99999 PR PBB SHADOW E&M-EST. PATIENT-LVL V: CPT | Mod: PBBFAC,,, | Performed by: INTERNAL MEDICINE

## 2025-05-13 RX ORDER — RSV VACC, PREF A AND PREF B/PF 120MCG/0.5
0.5 VIAL (EA) INTRAMUSCULAR ONCE
Qty: 0.5 ML | Refills: 0 | Status: SHIPPED | OUTPATIENT
Start: 2025-05-13 | End: 2025-05-13

## 2025-05-13 RX ORDER — LEVOTHYROXINE SODIUM 88 UG/1
88 TABLET ORAL
Qty: 90 TABLET | Refills: 0 | Status: SHIPPED | OUTPATIENT
Start: 2025-05-13 | End: 2025-08-11

## 2025-05-13 NOTE — PROGRESS NOTES
HISTORY OF PRESENT ILLNESS:  Darlyn Donato is a 65 y.o. female who presents to the clinic today for Annual.     Last seen by me 7/2024.    Darlyn presents today for follow up of multiple chronic conditions    Followed by rheumatology for RA.  On MTX with folic acid.  She discontinued methotrexate for 9-12 months due to prescription refill issues. She subsequently experienced a flare affecting multiple joints. Left hand is occasionally affected but less severely. Her feet are more severely affected than hands, with right foot worse than left. She reports possible back involvement but uncertain if related to RA. She has resumed oral MTX since fall 2024 and is following with Dr. Hernandez.  Current medications include methotrexate (recently increased from 6 to 8 pills weekly) and folic acid. She discontinued prednisone recently.    She is on Trelegy Ellipta for COPD/emphysema mgmt.  She reports heat exacerbates her COPD symptoms. She uses rescue inhaler with exertion, particularly when climbing stairs multiple times at work.  She denies recent exacerbations or hospitalizations for COPD or lung infections.    She is currently receiving Prolia injections, having completed two doses over approximately one year. Previously treated with Fosamax for about five years (was started 2020/2021). She denies any issues with Prolia infusions. Last bone density scan was performed in October 2023.    She quit smoking 17 years ago after a 30-year history of >2 packs per day. She reports very rare alcohol use.    She underwent supracervical hysterectomy in 2013 performed by Dr. Suarez.    Last dose CT chest (7/2024)  FINDINGS:  Lungs: Focal opacity about the right middle lobe measuring approximately 1.5 x 1.0 cm, likely representing area of focal atelectasis. Additional atelectasis of the lingula scattered pulmonary micro nodules, some of which demonstrate tree-in-bud distribution, likely representing sequela of evolving infectious or non  "infectious inflammatory process. The lungs show findings consistent with emphysema.    Pleura: No effusion..    Heart and pericardium: Normal size without effusion.    Aorta and vasculature: No significant atherosclerosis including coronary arteries.    Chest wall and skeletal structures: Unremarkable except age-appropriate degenerative changes.    Upper abdomen: Unremarkable.    Impression:    Lung-RADS Category: 2 - Benign Appearance or Behavior - continue annual screening with LDCT in 12 months.    Clinically or potentially clinically significant non lung cancer finding: S - Significant.    Prior Lung Cancer Modifier: No history of prior lung cancer.    Emphysema.     ROS:  General: -fever, -chills, -fatigue, -weight gain, -weight loss  Eyes: -vision changes, -redness, -discharge  ENT: -ear pain, -nasal congestion, -sore throat  Cardiovascular: -chest pain, -palpitations, -lower extremity edema  Respiratory: -cough, -shortness of breath, +exertional dyspnea  Gastrointestinal: -abdominal pain, -nausea, -vomiting, -diarrhea, -constipation, -blood in stool  Genitourinary: -dysuria, -hematuria, -frequency  Musculoskeletal: +joint pain, -muscle pain, +joint swelling, +limb swelling, +limb pain, +back pain  Skin: -rash, -lesion  Neurological: -headache, -dizziness, -numbness, -tingling  Psychiatric: -anxiety, -depression, -sleep difficulty             PAST MEDICAL HISTORY:  Past Medical History:   Diagnosis Date    Arthritis     Depression     Early dry stage nonexudative age-related macular degeneration of both eyes 8/19/2019    Elevated alkaline phosphatase level     Hyperlipidemia     Nuclear sclerosis of both eyes 8/19/2019    Osteopenia     Personal history of female infertility     Respiratory distress     " stopped Breathing" after 2 pain meds were given in a close time frame to one another.    Thyroid disease     multinodular goiter    Vitamin D deficiency disease        PAST SURGICAL HISTORY:  Past Surgical " History:   Procedure Laterality Date    BREAST BIOPSY Left     over 10 yrs ago    BREAST SURGERY      benign biopsy on left    COLONOSCOPY N/A 6/7/2019    Procedure: COLONOSCOPY;  Surgeon: Julian Waldrop MD;  Location: [facility] ([address] [place] [address]);  Service: Endoscopy;  Laterality: N/A;  2nd floor - per anethesia note from Pt hysterectomy 2013, Pt possible difficult intubation - ERW    COLONOSCOPY N/A 11/1/2021    Procedure: COLONOSCOPY;  Surgeon: Broyd Sanchez MD;  Location: [facility];  Service: Endoscopy;  Laterality: N/A;  pt completed COVID vaccine- see Immunization record in chart-rb  pt reports n/v with anesthesia  covid test 10/1 [place], prep instr portal -ml  8/10 LVM and portal of new arrival time-ml  9/14 r/s again due to 10/4 date no OutPts scheduling; Pt wants Early Mondays ONLY  covid elda    HYSTERECTOMY      LAPAROSCOPY W/ MINI-LAPAROTOMY      for fertility issues    OOPHORECTOMY      TN REMOVAL OF OVARY/TUBE(S)      RECTAL SURGERY      excess skin growth removal    supracervical hysterectomy      TONSILLECTOMY         SOCIAL HISTORY:  Social History[1]    FAMILY HISTORY:  Family History   Problem Relation Name Age of Onset    Breast cancer Mother      Hyperlipidemia Mother      Macular degeneration Mother      Emphysema Father emphysema     No Known Problems Sister      No Known Problems Brother ms     Macular degeneration Maternal Aunt      No Known Problems Maternal Uncle      No Known Problems Paternal Aunt      No Known Problems Paternal Uncle      No Known Problems Maternal Grandmother      No Known Problems Maternal Grandfather      No Known Problems Paternal Grandmother      No Known Problems Paternal Grandfather      Colon cancer Neg Hx      Ovarian cancer Neg Hx      Melanoma Neg Hx      Amblyopia Neg Hx      Blindness Neg Hx      Cancer Neg Hx      Cataracts Neg Hx      Diabetes Neg Hx      Hypertension Neg Hx      Retinal detachment Neg Hx      Strabismus Neg Hx      Stroke Neg Hx      Thyroid  disease Neg Hx      Glaucoma Neg Hx         ALLERGIES AND MEDICATIONS: updated and reviewed.  Review of patient's allergies indicates:   Allergen Reactions    Hydromorphone Shortness Of Breath    Percocet  [oxycodone-acetaminophen] Itching and Nausea Only    Vicodin  [hydrocodone-acetaminophen] Nausea Only and Nausea And Vomiting     Medication List with Changes/Refills   New Medications    RSV, PREF A AND PREF B,PF, (ABRYSVO, PF,) 120 MCG/0.5 ML SOLR VACCINE    Inject 0.5 mLs (120 mcg total) into the muscle once. for 1 dose   Current Medications    ALBUTEROL (PROAIR HFA) 90 MCG/ACTUATION INHALER    Inhale 2 puffs into the lungs every 4 (four) hours as needed for Wheezing or Shortness of Breath. 2 HFA Aerosol Inhaler Inhalation Every 4-6 hours    DENOSUMAB (PROLIA) 60 MG/ML SYRG    Inject 1 mL (60 mg total) into the skin every 6 (six) months.    DICLOFENAC SODIUM (VOLTAREN) 1 % GEL    Apply 2 g topically 3 (three) times daily.    FLUTICASONE-UMECLIDIN-VILANTER (TRELEGY ELLIPTA) 200-62.5-25 MCG INHALER    Inhale 1 puff into the lungs once daily.    FOLIC ACID (FOLVITE) 1 MG TABLET    TAKE 1 TABLET(1 MG) BY MOUTH EVERY DAY    METHOTREXATE 2.5 MG TAB    Take 6 tablets (15 mg total) by mouth every 7 days.    MULTIVIT-MIN/FERROUS GLUCONATE (MULTI-DONNIE ORAL)    Take by mouth.    PREDNISONE (DELTASONE) 5 MG TABLET    2 twice daily for 5 days, 2 in morning for 5 days, then 1 daily   Changed and/or Refilled Medications    Modified Medication Previous Medication    LEVOTHYROXINE (SYNTHROID) 88 MCG TABLET levothyroxine (SYNTHROID) 88 MCG tablet       Take 1 tablet (88 mcg total) by mouth before breakfast.    Take 1 tablet (88 mcg total) by mouth before breakfast.   Discontinued Medications    CHOLECALCIFEROL, VITAMIN D3, (VITAMIN D3) 50 MCG (2,000 UNIT) TAB    Take 1 tablet (2,000 Units total) by mouth once daily.    CHOLECALCIFEROL, VITAMIN D3, (VITAMIN D3) 50 MCG (2,000 UNIT) TAB    Take 1 tablet (2,000 Units total) by mouth  "once daily.    PROMETHAZINE-DEXTROMETHORPHAN (PROMETHAZINE-DM) 6.25-15 MG/5 ML SYRP    Take 5 mLs by mouth nightly as needed (cough).          CARE TEAM:  Patient Care Team:  Amando Prajapati MD as PCP - General (Internal Medicine)  Phil Amaya, PharmD as Pharmacist (Pharmacist)         PHYSICAL EXAM:   Vitals:    05/13/25 0835   BP: 108/72   Pulse: 74   Resp: 17     Weight: 70.4 kg (155 lb 3.3 oz)   Height: 5' 2" (157.5 cm)   Body mass index is 28.39 kg/m².    Physical Exam    General: No acute distress. Well-developed. Well-nourished.  Eyes: EOMI. Sclerae anicteric.  HENT: Normocephalic. Atraumatic. Nares patent. Moist oral mucosa.  Ears: Bilateral TMs clear. Bilateral EACs clear.  Cardiovascular: Regular rate. Regular rhythm. No murmurs. No rubs. No gallops. Normal S1, S2.  Respiratory: Normal respiratory effort. Clear to auscultation bilaterally. No rales. No rhonchi. No wheezing.  Abdomen: Soft. Non-tender. Non-distended. Normoactive bowel sounds.  Musculoskeletal: No  obvious deformity.  Extremities: No lower extremity edema.  Neurological: Alert & oriented x3. No slurred speech. Normal gait.  Psychiatric: Normal mood. Normal affect. Good insight. Good judgment.  Skin: Warm. Dry. No rash.             ASSESSMENT AND PLAN:  IMPRESSION:  - Rheumatoid arthritis: Recently restarted methotrexate with dose increase from 6 to 8 pills weekly. Elevated CRP levels, indicating ongoing inflammation.  - COPD: Transitioned from Breo to Trelegy Ellipta, reporting improved symptom control. No recent hospitalizations or ER visits for exacerbations.  - Osteoporosis: Transitioned from Fosamax to Prolia injections, with 2 doses received so far.  - Noted partial hysterectomy history (supracervical in 2013) and need for cervical cancer screening upcoming.  - Continued levothyroxine 88 mcg daily.  - Informed patient about eligibility for RSV vaccine, highlighting its importance for those with emphysema and smoking " history.    Annual physical exam  Healthcare maintenance  -     RSV, preF A and preF B,PF, (ABRYSVO, PF,) 120 mcg/0.5 mL SolR vaccine; Inject 0.5 mLs (120 mcg total) into the muscle once. for 1 dose  Dispense: 0.5 mL; Refill: 0  -     Hemoglobin A1C; Future; Expected date: 08/13/2025  -     Comprehensive Metabolic Panel; Future; Expected date: 08/13/2025  -     Lipid Panel; Future; Expected date: 08/13/2025  -     CBC Auto Differential; Future; Expected date: 08/13/2025  -     TSH; Future; Expected date: 08/13/2025  -     Vitamin D; Future; Expected date: 08/13/2025  -     Ambulatory referral/consult to Obstetrics / Gynecology; Future; Expected date: 05/20/2025  -     T4, Free; Future; Expected date: 07/13/2025  - Darlyn had a supracervical hysterectomy in 2013 under Dr. Suarez, with remaining cervical stump.  - Advised to schedule an annual well woman exam and Pap smear with Dr. Stewart after June.    Need for RSV vaccination  -     RSV, preF A and preF B,PF, (ABRYSVO, PF,) 120 mcg/0.5 mL SolR vaccine; Inject 0.5 mLs (120 mcg total) into the muscle once. for 1 dose  Dispense: 0.5 mL; Refill: 0    Centrilobular emphysema  - Darlyn using Trelegy Ellipta daily with good symptom control, better than previous Breo regimen, with no recent exacerbations requiring ER or hospital visits.  - Albuterol inhaler used occasionally when overexerted, such as climbing stairs at work.  - Discussed Trelegy's mechanism of action in reducing airway inflammation and bronchial obstruction.  - Recommend RSV vaccine due to history of emphysema and smoking.  - Advised incorporating at least 150 minutes of moderate intensity aerobic exercise weekly (e.g., brisk walking, biking, or swimming) to improve physical conditioning.    Hyperlipidemia, unspecified hyperlipidemia type  -     Lipid Panel; Future; Expected date: 08/13/2025    Rheumatoid arthritis involving multiple sites with positive rheumatoid factor  Primary osteoarthritis involving multiple  joints  Immunosuppression  - Monitored swelling in hands and wrists, with right hand and foot more affected than left  - Pain is improving with treatment.  - Darlyn was off methotrexate for several months due to prescription refill issues.  - Noted increased methotrexate dosage from 6 to 8 pills weekly and continued folic acid.  - Darlyn to follow up with rheumatologist Dr. Hernandez as scheduled in June.    Acquired hypothyroidism  -     levothyroxine (SYNTHROID) 88 MCG tablet; Take 1 tablet (88 mcg total) by mouth before breakfast.  Dispense: 90 tablet; Refill: 0  - Darlyn on levothyroxine 88 mcg daily, switched to generic due to cost concerns.  - Medication refill approved for a 3-month supply.  - Ordered thyroid function labs for August 2025 with follow-up scheduled to review results and reassess thyroid function.    Former smoker  Screening for lung cancer  -     CT Chest Lung Screening Low Dose; Future; Expected date: 08/01/2025  - Darlyn quit smoking 17 years ago after smoking 2+ packs daily for 30 years.  - Ordered lung cancer screening CT for August 1, 2025.  - Recommend RSV vaccine due to smoking history to reduce infection risk.    Prediabetes  -     Hemoglobin A1C; Future; Expected date: 08/13/2025  -     Lipid Panel; Future; Expected date: 08/13/2025    Vitamin D deficiency disease  -     Vitamin D; Future; Expected date: 08/13/2025    Age related osteoporosis, unspecified pathological fracture presence  - Darlyn transitioned to Prolia after approximately 5 years on Fosamax, with 2 injections received over the past year and no issues handling infusions.  - Next Prolia injection due in September.  - Scheduled for appointment with Dr. Alatorre in August for ongoing osteoporosis management.         - Darlyn was previously on prednisone but has been tapered off for a few months.    - Referred to pharmacy (e.g., [facility]) for RSV vaccine.  - Darlyn to contact billing department to clarify charges for mammogram and US.                 Follow up one year or sooner as needed.    This note was generated with the assistance of ambient listening technology. Verbal consent was obtained by the patient and accompanying visitor(s) for the recording of patient appointment to facilitate this note. I attest to having reviewed and edited the generated note for accuracy, though some syntax or spelling errors may persist. Please contact the author of this note for any clarification.         [1]   Social History  Socioeconomic History    Marital status:     Number of children: 0    Years of education: some colle   Occupational History    Occupation:  law firm     Employer: [employer]   Tobacco Use    Smoking status: Former     Current packs/day: 0.00     Types: Cigarettes     Quit date: 2008     Years since quittin.0    Smokeless tobacco: Never    Tobacco comments:     Quit    Substance and Sexual Activity    Alcohol use: No     Comment: Rare    Drug use: No    Sexual activity: Not Currently     Partners: Male   Social History Narrative    Adult Screenings    Mammogram( for females) done 2011    Pap ( for females)? Needs referral to  GYN    Colonoscopy age  52-Not done yet    Flu shot yearly to be done today  13    Td ?    Pneumovax recommended one time  at age  67    Zostavax recommended one time at  age  62    Eye exam recommended yearly- not done yet     Bone density 2011-osteopenia

## 2025-06-05 ENCOUNTER — OFFICE VISIT (OUTPATIENT)
Dept: RHEUMATOLOGY | Facility: CLINIC | Age: 65
End: 2025-06-05
Payer: COMMERCIAL

## 2025-06-05 ENCOUNTER — RESULTS FOLLOW-UP (OUTPATIENT)
Dept: RHEUMATOLOGY | Facility: CLINIC | Age: 65
End: 2025-06-05

## 2025-06-05 ENCOUNTER — LAB VISIT (OUTPATIENT)
Dept: LAB | Facility: HOSPITAL | Age: 65
End: 2025-06-05
Payer: COMMERCIAL

## 2025-06-05 VITALS
HEIGHT: 62 IN | WEIGHT: 155 LBS | SYSTOLIC BLOOD PRESSURE: 115 MMHG | DIASTOLIC BLOOD PRESSURE: 79 MMHG | HEART RATE: 76 BPM | BODY MASS INDEX: 28.52 KG/M2

## 2025-06-05 DIAGNOSIS — D84.9 IMMUNOSUPPRESSION: ICD-10-CM

## 2025-06-05 DIAGNOSIS — H35.3131 EARLY DRY STAGE NONEXUDATIVE AGE-RELATED MACULAR DEGENERATION OF BOTH EYES: ICD-10-CM

## 2025-06-05 DIAGNOSIS — M15.0 PRIMARY OSTEOARTHRITIS INVOLVING MULTIPLE JOINTS: ICD-10-CM

## 2025-06-05 DIAGNOSIS — Z79.60 LONG-TERM USE OF IMMUNOSUPPRESSANT MEDICATION: ICD-10-CM

## 2025-06-05 DIAGNOSIS — M05.79 RHEUMATOID ARTHRITIS INVOLVING MULTIPLE SITES WITH POSITIVE RHEUMATOID FACTOR: Primary | ICD-10-CM

## 2025-06-05 DIAGNOSIS — M05.79 RHEUMATOID ARTHRITIS INVOLVING MULTIPLE SITES WITH POSITIVE RHEUMATOID FACTOR: ICD-10-CM

## 2025-06-05 DIAGNOSIS — J43.2 CENTRILOBULAR EMPHYSEMA: ICD-10-CM

## 2025-06-05 LAB
ABSOLUTE EOSINOPHIL (OHS): 0.25 K/UL
ABSOLUTE MONOCYTE (OHS): 0.48 K/UL (ref 0.3–1)
ABSOLUTE NEUTROPHIL COUNT (OHS): 6.66 K/UL (ref 1.8–7.7)
ALBUMIN SERPL BCP-MCNC: 3.9 G/DL (ref 3.5–5.2)
ALP SERPL-CCNC: 104 UNIT/L (ref 40–150)
ALT SERPL W/O P-5'-P-CCNC: 24 UNIT/L (ref 10–44)
ANION GAP (OHS): 8 MMOL/L (ref 8–16)
AST SERPL-CCNC: 13 UNIT/L (ref 11–45)
BASOPHILS # BLD AUTO: 0.07 K/UL
BASOPHILS NFR BLD AUTO: 0.7 %
BILIRUB SERPL-MCNC: 0.3 MG/DL (ref 0.1–1)
BUN SERPL-MCNC: 8 MG/DL (ref 8–23)
CALCIUM SERPL-MCNC: 9.2 MG/DL (ref 8.7–10.5)
CHLORIDE SERPL-SCNC: 105 MMOL/L (ref 95–110)
CO2 SERPL-SCNC: 29 MMOL/L (ref 23–29)
CREAT SERPL-MCNC: 0.7 MG/DL (ref 0.5–1.4)
CRP SERPL-MCNC: 9.4 MG/L
ERYTHROCYTE [DISTWIDTH] IN BLOOD BY AUTOMATED COUNT: 14.6 % (ref 11.5–14.5)
ERYTHROCYTE [SEDIMENTATION RATE] IN BLOOD BY PHOTOMETRIC METHOD: 15 MM/HR
GFR SERPLBLD CREATININE-BSD FMLA CKD-EPI: >60 ML/MIN/1.73/M2
GLUCOSE SERPL-MCNC: 91 MG/DL (ref 70–110)
HCT VFR BLD AUTO: 42.8 % (ref 37–48.5)
HGB BLD-MCNC: 13.4 GM/DL (ref 12–16)
IMM GRANULOCYTES # BLD AUTO: 0.04 K/UL (ref 0–0.04)
IMM GRANULOCYTES NFR BLD AUTO: 0.4 % (ref 0–0.5)
LYMPHOCYTES # BLD AUTO: 1.91 K/UL (ref 1–4.8)
MCH RBC QN AUTO: 29.8 PG (ref 27–31)
MCHC RBC AUTO-ENTMCNC: 31.3 G/DL (ref 32–36)
MCV RBC AUTO: 95 FL (ref 82–98)
NUCLEATED RBC (/100WBC) (OHS): 0 /100 WBC
PLATELET # BLD AUTO: 313 K/UL (ref 150–450)
PMV BLD AUTO: 9.9 FL (ref 9.2–12.9)
POTASSIUM SERPL-SCNC: 4.2 MMOL/L (ref 3.5–5.1)
PROT SERPL-MCNC: 7 GM/DL (ref 6–8.4)
RBC # BLD AUTO: 4.5 M/UL (ref 4–5.4)
RELATIVE EOSINOPHIL (OHS): 2.7 %
RELATIVE LYMPHOCYTE (OHS): 20.3 % (ref 18–48)
RELATIVE MONOCYTE (OHS): 5.1 % (ref 4–15)
RELATIVE NEUTROPHIL (OHS): 70.8 % (ref 38–73)
SODIUM SERPL-SCNC: 142 MMOL/L (ref 136–145)
WBC # BLD AUTO: 9.41 K/UL (ref 3.9–12.7)

## 2025-06-05 PROCEDURE — 36415 COLL VENOUS BLD VENIPUNCTURE: CPT

## 2025-06-05 PROCEDURE — 99214 OFFICE O/P EST MOD 30 MIN: CPT | Mod: S$GLB,,, | Performed by: INTERNAL MEDICINE

## 2025-06-05 PROCEDURE — 1160F RVW MEDS BY RX/DR IN RCRD: CPT | Mod: CPTII,S$GLB,, | Performed by: INTERNAL MEDICINE

## 2025-06-05 PROCEDURE — 3078F DIAST BP <80 MM HG: CPT | Mod: CPTII,S$GLB,, | Performed by: INTERNAL MEDICINE

## 2025-06-05 PROCEDURE — G2211 COMPLEX E/M VISIT ADD ON: HCPCS | Mod: S$GLB,,, | Performed by: INTERNAL MEDICINE

## 2025-06-05 PROCEDURE — 1159F MED LIST DOCD IN RCRD: CPT | Mod: CPTII,S$GLB,, | Performed by: INTERNAL MEDICINE

## 2025-06-05 PROCEDURE — 3074F SYST BP LT 130 MM HG: CPT | Mod: CPTII,S$GLB,, | Performed by: INTERNAL MEDICINE

## 2025-06-05 PROCEDURE — 3008F BODY MASS INDEX DOCD: CPT | Mod: CPTII,S$GLB,, | Performed by: INTERNAL MEDICINE

## 2025-06-05 PROCEDURE — 99999 PR PBB SHADOW E&M-EST. PATIENT-LVL III: CPT | Mod: PBBFAC,,, | Performed by: INTERNAL MEDICINE

## 2025-06-05 RX ORDER — METHOTREXATE 2.5 MG/1
20 TABLET ORAL
Qty: 32 TABLET | Refills: 4 | Status: SHIPPED | OUTPATIENT
Start: 2025-06-05

## 2025-06-17 ENCOUNTER — PATIENT MESSAGE (OUTPATIENT)
Dept: RHEUMATOLOGY | Facility: CLINIC | Age: 65
End: 2025-06-17
Payer: COMMERCIAL

## 2025-06-22 ENCOUNTER — PATIENT MESSAGE (OUTPATIENT)
Dept: FAMILY MEDICINE | Facility: CLINIC | Age: 65
End: 2025-06-22
Payer: COMMERCIAL

## 2025-06-22 DIAGNOSIS — M05.79 RHEUMATOID ARTHRITIS INVOLVING MULTIPLE SITES WITH POSITIVE RHEUMATOID FACTOR: ICD-10-CM

## 2025-06-23 RX ORDER — TIZANIDINE 2 MG/1
2 TABLET ORAL EVERY 8 HOURS PRN
Qty: 30 TABLET | Refills: 0 | Status: SHIPPED | OUTPATIENT
Start: 2025-06-23 | End: 2025-07-03

## 2025-06-23 NOTE — TELEPHONE ENCOUNTER
Care Due:                  Date            Visit Type   Department     Provider  --------------------------------------------------------------------------------                                [facility] FAMILY                              PRIMARY      MEDICINE /  Last Visit: 05-      CARE ([facility])   INTERNAL MED   Amando Prajapati                              [facility]                              PRIMARY      MEDICINE /  Next Visit: 05-      CARE ([facility])   INTERNAL MED   Amando Prajapati                                                            Last  Test          Frequency    Reason                     Performed    Due Date  --------------------------------------------------------------------------------    TSH.........  12 months..  levothyroxine............  08-   08-    [facility] Embedded Care Due Messages. Reference number: [id number].   6/23/2025 9:25:51 AM CDT

## 2025-07-22 ENCOUNTER — OFFICE VISIT (OUTPATIENT)
Dept: OBSTETRICS AND GYNECOLOGY | Facility: CLINIC | Age: 65
End: 2025-07-22
Payer: COMMERCIAL

## 2025-07-22 VITALS
HEIGHT: 62 IN | BODY MASS INDEX: 28.37 KG/M2 | SYSTOLIC BLOOD PRESSURE: 112 MMHG | WEIGHT: 154.19 LBS | DIASTOLIC BLOOD PRESSURE: 78 MMHG

## 2025-07-22 DIAGNOSIS — Z00.00 ANNUAL PHYSICAL EXAM: ICD-10-CM

## 2025-07-22 DIAGNOSIS — Z00.00 HEALTHCARE MAINTENANCE: ICD-10-CM

## 2025-07-22 DIAGNOSIS — Z01.419 ENCOUNTER FOR ANNUAL ROUTINE GYNECOLOGICAL EXAMINATION: Primary | ICD-10-CM

## 2025-07-22 PROCEDURE — 99999 PR PBB SHADOW E&M-EST. PATIENT-LVL III: CPT | Mod: PBBFAC,,, | Performed by: STUDENT IN AN ORGANIZED HEALTH CARE EDUCATION/TRAINING PROGRAM

## 2025-07-22 NOTE — PROGRESS NOTES
Subjective     Patient ID: Darlyn Donato is a 65 y.o. female.    Chief Complaint:  Gynecologic Exam      History of Present Illness  66 yo  presents for [employer]    Occasional DRAGAN symptoms, gets sudden urge to go when sitting up from watching TV. Occasionally has trouble making it to bathroom  Some vaginal dryness, not SA her  has some health issues  No constipation or trouble passing BM. Has a history of hysterectomy with oophorectomy, still has cervix         Annual Exam-Postmenopausal  Patient presents for annual exam. The patient has no complaints today. The patient is not currently sexually active. GYN screening history: last pap: was normal and last mammogram: was normal. The patient is not currently taking hormone replacement therapy. Patient denies post-menopausal vaginal bleeding. The patient wears seatbelts: no. The patient participates in regular exercise: no. Has the patient ever been transfused or tattooed?: no. The patient reports that there is not domestic violence in her life.    GYN & OB History  No LMP recorded. Patient has had a hysterectomy.   Date of Last Pap: 2022    OB History    Para Term  AB Living   1 0 0 0 1 0   SAB IAB Ectopic Multiple Live Births   1 0 0 0 0      # Outcome Date GA Lbr Sam/2nd Weight Sex Type Anes PTL Lv   1 SAB               Obstetric Comments   S/p supracervical hysterectomy/BSO @ age 49 for ovarian cyst   Denies abnl pap   MMG 2021 neg       Review of Systems  Review of Systems   All other systems reviewed and are negative.         Objective   Physical Exam:   Constitutional: She appears well-developed and well-nourished.    HENT:   Head: Normocephalic and atraumatic.    Eyes: Conjunctivae and EOM are normal.      Pulmonary/Chest: Effort normal. Right breast exhibits no mass and no nipple discharge. Left breast exhibits no mass and no nipple discharge.        Abdominal: Soft.     Genitourinary:    Vagina, right adnexa and left adnexa  normal.      Pelvic exam was performed with patient in the lithotomy position.   The external female genitalia was normal.   There is no lesion on the right labia. There is no lesion on the left labia. Cervix is normal. Right adnexum displays no mass. Left adnexum displays no mass. No vaginal discharge or bleeding in the vagina. Cervix exhibits no lesion and no discharge. Uterus is absent.    Genitourinary Comments: Female chaperone was present for duration of exam    Apical prolapse of cervix             Musculoskeletal: Normal range of motion.       Neurological: She is alert.    Skin: Skin is warm and dry.    Psychiatric: She has a normal mood and affect. Her behavior is normal.            Assessment and Plan     1. Encounter for annual routine gynecological examination    2. Healthcare maintenance    3. Annual physical exam          Plan:  1. Encounter for annual routine gynecological examination (Primary)  - Pap not yet indicated.  - Screening tests as ordered.  - Diet and exercise encouraged.  Seat belt use encouraged.  Reviewed ASCCP Pap guidelines and screening recommendations.  Calcium and vitamin D recommended.     Counseling: Exercise  Health Screens: Mammography  Colonoscopy:discussed  Health Maintenance reviewed  Perimenopause/Menopause  - discussed prolapse, urinary incontinence, vaginal dryness. Not currently concerning her, aware she can return to discuss as needed    2. Healthcare maintenance  - Ambulatory referral/consult to Obstetrics / Gynecology    3. Annual physical exam  - Ambulatory referral/consult to Obstetrics / Gynecology     Follow up for [employer] in 1 year     Roverto Luque III, MD  [facility]   [address]  [place] [id number] [phone]

## 2025-08-12 ENCOUNTER — E-CONSULT (OUTPATIENT)
Dept: PHARMACY | Facility: CLINIC | Age: 65
End: 2025-08-12
Payer: COMMERCIAL

## 2025-08-12 ENCOUNTER — OFFICE VISIT (OUTPATIENT)
Dept: ENDOCRINOLOGY | Facility: CLINIC | Age: 65
End: 2025-08-12
Payer: COMMERCIAL

## 2025-08-12 VITALS
HEART RATE: 76 BPM | BODY MASS INDEX: 27.98 KG/M2 | SYSTOLIC BLOOD PRESSURE: 123 MMHG | DIASTOLIC BLOOD PRESSURE: 74 MMHG | WEIGHT: 153 LBS

## 2025-08-12 DIAGNOSIS — M81.0 OSTEOPOROSIS, UNSPECIFIED OSTEOPOROSIS TYPE, UNSPECIFIED PATHOLOGICAL FRACTURE PRESENCE: Primary | ICD-10-CM

## 2025-08-12 DIAGNOSIS — E55.9 VITAMIN D DEFICIENCY DISEASE: ICD-10-CM

## 2025-08-12 DIAGNOSIS — E03.9 ACQUIRED HYPOTHYROIDISM: ICD-10-CM

## 2025-08-12 DIAGNOSIS — N20.0 KIDNEY STONES: ICD-10-CM

## 2025-08-12 PROCEDURE — 1160F RVW MEDS BY RX/DR IN RCRD: CPT | Mod: CPTII,S$GLB,, | Performed by: HOSPITALIST

## 2025-08-12 PROCEDURE — 99999 PR PBB SHADOW E&M-EST. PATIENT-LVL III: CPT | Mod: PBBFAC,,, | Performed by: HOSPITALIST

## 2025-08-12 PROCEDURE — 3074F SYST BP LT 130 MM HG: CPT | Mod: CPTII,S$GLB,, | Performed by: HOSPITALIST

## 2025-08-12 PROCEDURE — 1159F MED LIST DOCD IN RCRD: CPT | Mod: CPTII,S$GLB,, | Performed by: HOSPITALIST

## 2025-08-12 PROCEDURE — 99214 OFFICE O/P EST MOD 30 MIN: CPT | Mod: S$GLB,,, | Performed by: HOSPITALIST

## 2025-08-12 PROCEDURE — 3078F DIAST BP <80 MM HG: CPT | Mod: CPTII,S$GLB,, | Performed by: HOSPITALIST

## 2025-08-12 PROCEDURE — 3008F BODY MASS INDEX DOCD: CPT | Mod: CPTII,S$GLB,, | Performed by: HOSPITALIST

## 2025-08-13 ENCOUNTER — PATIENT MESSAGE (OUTPATIENT)
Dept: ENDOCRINOLOGY | Facility: CLINIC | Age: 65
End: 2025-08-13
Payer: COMMERCIAL

## 2025-08-14 ENCOUNTER — PATIENT OUTREACH (OUTPATIENT)
Dept: ADMINISTRATIVE | Facility: HOSPITAL | Age: 65
End: 2025-08-14
Payer: COMMERCIAL

## 2025-08-15 ENCOUNTER — HOSPITAL ENCOUNTER (OUTPATIENT)
Dept: RADIOLOGY | Facility: HOSPITAL | Age: 65
Discharge: HOME | End: 2025-08-15
Attending: INTERNAL MEDICINE
Payer: COMMERCIAL

## 2025-08-15 DIAGNOSIS — Z00.00 ANNUAL PHYSICAL EXAM: ICD-10-CM

## 2025-08-15 DIAGNOSIS — Z12.2 SCREENING FOR LUNG CANCER: ICD-10-CM

## 2025-08-15 DIAGNOSIS — Z87.891 FORMER SMOKER: ICD-10-CM

## 2025-08-15 PROCEDURE — 71271 CT THORAX LUNG CANCER SCR C-: CPT | Mod: TC

## 2025-08-15 PROCEDURE — 71271 CT THORAX LUNG CANCER SCR C-: CPT | Mod: 26,,, | Performed by: RADIOLOGY

## 2025-08-26 DIAGNOSIS — E03.9 ACQUIRED HYPOTHYROIDISM: ICD-10-CM

## 2025-08-26 RX ORDER — LEVOTHYROXINE SODIUM 88 UG/1
88 TABLET ORAL
Qty: 90 TABLET | Refills: 2 | Status: SHIPPED | OUTPATIENT
Start: 2025-08-26 | End: 2026-05-23